# Patient Record
Sex: MALE | Race: WHITE | Employment: FULL TIME | URBAN - METROPOLITAN AREA
[De-identification: names, ages, dates, MRNs, and addresses within clinical notes are randomized per-mention and may not be internally consistent; named-entity substitution may affect disease eponyms.]

---

## 2017-04-24 ENCOUNTER — OFFICE VISIT (OUTPATIENT)
Dept: ORTHOPEDIC SURGERY | Age: 52
End: 2017-04-24

## 2017-04-24 VITALS — HEIGHT: 74 IN | WEIGHT: 274.91 LBS | BODY MASS INDEX: 35.28 KG/M2

## 2017-04-24 DIAGNOSIS — M17.11 PRIMARY OSTEOARTHRITIS OF RIGHT KNEE: Primary | ICD-10-CM

## 2017-04-24 PROCEDURE — 99213 OFFICE O/P EST LOW 20 MIN: CPT | Performed by: ORTHOPAEDIC SURGERY

## 2017-05-02 ENCOUNTER — OFFICE VISIT (OUTPATIENT)
Dept: ORTHOPEDIC SURGERY | Age: 52
End: 2017-05-02

## 2017-05-02 VITALS — BODY MASS INDEX: 35.28 KG/M2 | WEIGHT: 274.91 LBS | HEIGHT: 74 IN

## 2017-05-02 DIAGNOSIS — G56.01 CARPAL TUNNEL SYNDROME OF RIGHT WRIST: Primary | ICD-10-CM

## 2017-05-02 PROCEDURE — 99213 OFFICE O/P EST LOW 20 MIN: CPT | Performed by: ORTHOPAEDIC SURGERY

## 2017-05-04 ENCOUNTER — OFFICE VISIT (OUTPATIENT)
Dept: ORTHOPEDIC SURGERY | Age: 52
End: 2017-05-04

## 2017-05-04 DIAGNOSIS — M17.11 PRIMARY OSTEOARTHRITIS OF RIGHT KNEE: Primary | ICD-10-CM

## 2017-05-04 PROCEDURE — 20610 DRAIN/INJ JOINT/BURSA W/O US: CPT | Performed by: ORTHOPAEDIC SURGERY

## 2017-05-10 ENCOUNTER — OFFICE VISIT (OUTPATIENT)
Dept: ORTHOPEDIC SURGERY | Age: 52
End: 2017-05-10

## 2017-05-10 VITALS
DIASTOLIC BLOOD PRESSURE: 96 MMHG | BODY MASS INDEX: 35.28 KG/M2 | WEIGHT: 274.91 LBS | HEIGHT: 74 IN | HEART RATE: 88 BPM | SYSTOLIC BLOOD PRESSURE: 132 MMHG

## 2017-05-10 DIAGNOSIS — S16.1XXA CERVICAL STRAIN, INITIAL ENCOUNTER: Primary | ICD-10-CM

## 2017-05-10 PROCEDURE — 99212 OFFICE O/P EST SF 10 MIN: CPT | Performed by: PHYSICAL MEDICINE & REHABILITATION

## 2017-05-11 ENCOUNTER — OFFICE VISIT (OUTPATIENT)
Dept: ORTHOPEDIC SURGERY | Age: 52
End: 2017-05-11

## 2017-05-11 VITALS — BODY MASS INDEX: 35.28 KG/M2 | WEIGHT: 274.91 LBS | HEIGHT: 74 IN

## 2017-05-11 DIAGNOSIS — M17.11 PRIMARY OSTEOARTHRITIS OF RIGHT KNEE: Primary | ICD-10-CM

## 2017-05-11 PROCEDURE — 20610 DRAIN/INJ JOINT/BURSA W/O US: CPT | Performed by: ORTHOPAEDIC SURGERY

## 2017-05-18 ENCOUNTER — OFFICE VISIT (OUTPATIENT)
Dept: ORTHOPEDIC SURGERY | Age: 52
End: 2017-05-18

## 2017-05-18 DIAGNOSIS — M17.11 PRIMARY OSTEOARTHRITIS OF RIGHT KNEE: Primary | ICD-10-CM

## 2017-05-18 PROCEDURE — 20610 DRAIN/INJ JOINT/BURSA W/O US: CPT | Performed by: ORTHOPAEDIC SURGERY

## 2017-05-25 ENCOUNTER — OFFICE VISIT (OUTPATIENT)
Dept: ORTHOPEDIC SURGERY | Age: 52
End: 2017-05-25

## 2017-05-25 DIAGNOSIS — M17.11 PRIMARY OSTEOARTHRITIS OF RIGHT KNEE: Primary | ICD-10-CM

## 2017-05-25 PROCEDURE — 20610 DRAIN/INJ JOINT/BURSA W/O US: CPT | Performed by: ORTHOPAEDIC SURGERY

## 2017-06-01 ENCOUNTER — OFFICE VISIT (OUTPATIENT)
Dept: ORTHOPEDIC SURGERY | Age: 52
End: 2017-06-01

## 2017-06-01 DIAGNOSIS — M17.11 PRIMARY OSTEOARTHRITIS OF RIGHT KNEE: Primary | ICD-10-CM

## 2017-06-01 PROCEDURE — 20610 DRAIN/INJ JOINT/BURSA W/O US: CPT | Performed by: ORTHOPAEDIC SURGERY

## 2017-08-21 RX ORDER — NABUMETONE 500 MG/1
TABLET, FILM COATED ORAL
Qty: 60 TABLET | Refills: 2 | Status: SHIPPED | OUTPATIENT
Start: 2017-08-21 | End: 2018-07-01

## 2017-10-27 ENCOUNTER — OFFICE VISIT (OUTPATIENT)
Dept: ORTHOPEDIC SURGERY | Age: 52
End: 2017-10-27

## 2017-10-27 VITALS
HEIGHT: 74 IN | SYSTOLIC BLOOD PRESSURE: 139 MMHG | DIASTOLIC BLOOD PRESSURE: 97 MMHG | HEART RATE: 88 BPM | BODY MASS INDEX: 35.28 KG/M2 | WEIGHT: 274.91 LBS

## 2017-10-27 DIAGNOSIS — S16.1XXA CERVICAL STRAIN, INITIAL ENCOUNTER: Primary | ICD-10-CM

## 2017-10-27 PROCEDURE — 99212 OFFICE O/P EST SF 10 MIN: CPT | Performed by: PHYSICAL MEDICINE & REHABILITATION

## 2017-10-27 NOTE — PROGRESS NOTES
bilaterally reveals all areas to be without enlargement or induration  · Sensation: Sensation is intact without deficit  · Coordination/Balance: Good coordination     CERVICAL EXAMINATION:  · Inspection: Local inspection shows no step-off or bruising. Cervical alignment is normal.     · Palpation: No evidence of tenderness at the midline, and trapezius. Paraspinal tenderness is present. There is no step-off or paraspinal spasm. · Range of Motion: Near full extension and flexion  · Strength: 5/5 bilateral upper extremities   · Special Tests:    ·   Spurling's, L'Hermitte's & Haque's negative bilaterally. ·   Perla and Impingement tests are negative bilaterally. ·  Cubital and Carpal tunnel Tinel's negative bilaterally. · Skin:There are no rashes, ulcerations or lesions in right & left upper extremities. · Reflexes: Bilaterally triceps, biceps and brachioradialis are 2+. Clonus absent bilaterally at the feet. · Gait & station: Normal gait    · Additional Examinations:         · RIGHT UPPER EXTREMITY:  Inspection/examination of the right upper extremity does not show any tenderness, deformity or injury. Range of motion is full. There is no gross instability. There are no rashes, ulcerations or lesions. Strength and tone are normal.  · LEFT UPPER EXTREMITY: Inspection/examination of the left upper extremity does not show any tenderness, deformity or injury. Range of motion is full. There is no gross instability. There are no rashes, ulcerations or lesions. Strength and tone are normal.    Diagnostic Testing:     No new testing today    Impression:  #1 stable cervical strain and remote work injury      Plan:  #1 symptoms are tolerable and patient is doing well with current modified restrictions.   I only need to see him twice a year for routine follow-up continue same restrictions        MK Wooten

## 2017-10-31 ENCOUNTER — TELEPHONE (OUTPATIENT)
Dept: ORTHOPEDIC SURGERY | Age: 52
End: 2017-10-31

## 2017-10-31 ENCOUNTER — OFFICE VISIT (OUTPATIENT)
Dept: ORTHOPEDIC SURGERY | Age: 52
End: 2017-10-31

## 2017-10-31 VITALS — BODY MASS INDEX: 35.28 KG/M2 | WEIGHT: 274.91 LBS | HEIGHT: 74 IN

## 2017-10-31 DIAGNOSIS — M17.11 PRIMARY OSTEOARTHRITIS OF RIGHT KNEE: ICD-10-CM

## 2017-10-31 DIAGNOSIS — M17.11 PRIMARY LOCALIZED OSTEOARTHROSIS OF RIGHT LOWER LEG: Primary | Chronic | ICD-10-CM

## 2017-10-31 PROCEDURE — 99213 OFFICE O/P EST LOW 20 MIN: CPT | Performed by: ORTHOPAEDIC SURGERY

## 2017-10-31 PROCEDURE — 73560 X-RAY EXAM OF KNEE 1 OR 2: CPT | Performed by: ORTHOPAEDIC SURGERY

## 2017-10-31 NOTE — PROGRESS NOTES
KNEE VISIT      HISTORY OF PRESENT ILLNESS    Rudy Grey is a 47 y.o. male who presents for reevaluation of his right knee. He says been having issues with increasing odd feeling in his knee with symptoms consistent with increased arthritis. He has not had x-rays since his last surgery in 2014. He tried Visco supplementation periodically, but it does not seem to give him the long-term relief that he was hoping for generally. We finished last round in June of this year. ROS    Constitutional:  Denies fever or chills   Eyes:  Denies change in visual acuity   HENT:  Denies nasal congestion or sore throat   Respiratory:  Denies cough or shortness of breath   Cardiovascular:  Denies chest pain or edema   GI:  Denies abdominal pain, nausea, vomiting, bloody stools or diarrhea   :  Denies dysuria   Musculoskeletal: He has continued right hand pain  Integument:  Denies rash   Neurologic:  He has persistent right hand pain status post carpal tunnel release. Endocrine:  Denies polyuria or polydipsia   Lymphatic:  Denies swollen glands   Psychiatric:  Denies depression or anxiety   All other ROS negative except for above.     Past Surgical history    Past Surgical History:   Procedure Laterality Date    CARPAL TUNNEL RELEASE      COLONOSCOPY  11/04/2016    normal    CYST REMOVAL      HAND SURGERY      HERNIA REPAIR      KNEE ARTHROSCOPY      KNEE ARTHROSCOPY Right 12/01/2014    KNEE SURGERY         PAST MEDICAL    Past Medical History:   Diagnosis Date    Carpal tunnel syndrome 10/24/2013    Herniated disc     Hypertension     No history of procedure 11/04/2016    Primary localized osteoarthrosis, lower leg 10/17/2013       Allergies    Allergies   Allergen Reactions    Dye [Iodides]     Iodine Swelling    Pcn [Penicillins]     Penicillins Rash       Meds    Current Outpatient Prescriptions   Medication Sig Dispense Refill    nabumetone (RELAFEN) 500 MG tablet TAKE ONE TABLET BY MOUTH TWICE A DAY 60 tablet 2    meclizine (ANTIVERT) 25 MG tablet Take 25 mg by mouth 3 times daily as needed      methocarbamol (ROBAXIN-750) 750 MG tablet Take 1 tablet by mouth 3 times daily 90 tablet 3    methocarbamol (ROBAXIN-750) 750 MG tablet i po TID PRN 90 tablet 0    tadalafil (CIALIS) 5 MG tablet Take 5 mg by mouth as needed for Erectile Dysfunction.  traMADol (ULTRAM) 50 MG tablet Take 50 mg by mouth every 6 hours as needed.  aspirin 81 MG tablet Take 81 mg by mouth daily. No current facility-administered medications for this visit. Social    Social History     Social History    Marital status: Single     Spouse name: N/A    Number of children: N/A    Years of education: N/A     Occupational History    Not on file. Social History Main Topics    Smoking status: Former Smoker     Quit date: 1/30/1999    Smokeless tobacco: Never Used    Alcohol use Yes      Comment: rarely    Drug use: No    Sexual activity: Yes     Partners: Female     Other Topics Concern    Not on file     Social History Narrative    ** Merged History Encounter **            Family HISTORY    Family History   Problem Relation Age of Onset    Early Death Father     Heart Disease Father     Cancer Maternal Aunt     Cancer Maternal Grandmother        PHYSICAL EXAM    Vital Signs:  Ht 6' 2.02\" (1.88 m)   Wt 274 lb 14.6 oz (124.7 kg)   BMI 35.28 kg/m²   General Appearance:  Normal body habitus. Alert and oriented to person, place, and time. Affect:  Normal.   Gait:  Normal. Good balance and coordination. Skin:  Intact. Sensation:  Intact. Strength:  Intact. Reflexes:  Intact. Pulses:  Intact.    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 IMAGING STUDIES    X-rays 2 views of the right knee reveals increasingly severe osteoarthritis, most in the medial compartment was right knee with diminished on     IMPRESSION    Right knee pain secondary to osteoarthritis, with possible stress reaction    PLAN      1. Conservative care options including physical therapy, NSAIDs, bracing, and activity modification were discussed. 2.  The indications for therapeutic injections were discussed. 3.  The indications for additional imaging studies were discussed.    4.  After considering the various options discussed, the patient elected to pursue a course that includes proceed with an MRI of the right knee since he continues to pursue a conservative approach with diminishing

## 2017-11-02 ENCOUNTER — OFFICE VISIT (OUTPATIENT)
Dept: ORTHOPEDIC SURGERY | Age: 52
End: 2017-11-02

## 2017-11-02 VITALS — WEIGHT: 274.91 LBS | HEIGHT: 74 IN | BODY MASS INDEX: 35.28 KG/M2

## 2017-11-02 DIAGNOSIS — G56.01 CARPAL TUNNEL SYNDROME ON RIGHT: Primary | ICD-10-CM

## 2017-11-02 DIAGNOSIS — G56.01 CARPAL TUNNEL SYNDROME OF RIGHT WRIST: ICD-10-CM

## 2017-11-02 PROCEDURE — L3918 METACARP FX ORTHOSIS PRE OTS: HCPCS | Performed by: ORTHOPAEDIC SURGERY

## 2017-11-02 PROCEDURE — 99213 OFFICE O/P EST LOW 20 MIN: CPT | Performed by: ORTHOPAEDIC SURGERY

## 2017-11-02 NOTE — PROGRESS NOTES
methocarbamol (ROBAXIN-750) 750 MG tablet i po TID PRN 90 tablet 0    tadalafil (CIALIS) 5 MG tablet Take 5 mg by mouth as needed for Erectile Dysfunction.  traMADol (ULTRAM) 50 MG tablet Take 50 mg by mouth every 6 hours as needed.  aspirin 81 MG tablet Take 81 mg by mouth daily. No current facility-administered medications for this visit. Social    Social History     Social History    Marital status: Single     Spouse name: N/A    Number of children: N/A    Years of education: N/A     Occupational History    Not on file. Social History Main Topics    Smoking status: Former Smoker     Quit date: 1/30/1999    Smokeless tobacco: Never Used    Alcohol use Yes      Comment: rarely    Drug use: No    Sexual activity: Yes     Partners: Female     Other Topics Concern    Not on file     Social History Narrative    ** Merged History Encounter **            Family HISTORY    Family History   Problem Relation Age of Onset    Early Death Father     Heart Disease Father     Cancer Maternal Aunt     Cancer Maternal Grandmother        PHYSICAL EXAM    Vital Signs:  Ht 6' 2.02\" (1.88 m)   Wt 274 lb 14.6 oz (124.7 kg)   BMI 35.28 kg/m²   General Appearance:  Normal body habitus. Alert and oriented to person, place, and time. Affect:  Normal.   Gait:  Normal. Good balance and coordination. Reflexes:  Intact. Pulses:  Normal.   Skin:  Normal.     Wrist Exam  Hand dominance - Right  Surface Exam  no cutaneous lesions are seen      Right Hand Exam:      Neurologic Exam:    Reflexes:  Normal  Phalens:  Negative   Tinels:  Negative  Median Nerve Compression:  Negative  Thenar strength:  Normal  Thenar atrophy:  None noted  2 PD:  Normal  Elbow Flexion Test:  Negative  Is negative.   Finkelstein's test, but has some pain in the base of thumb pressure  Wrist Motion Right Left   DF     PF     RD     CMC     UD     SUP     PRO       NCV / EMG STUDIES    None    IMAGING STUDIES None    IMPRESSION    Right hand pain status post carpal tunnel release    PLAN    1. Conservative care options including physical therapy, NSAIDs, bracing, and activity modification were discussed. 2.  The indications for therapeutic injections were discussed. 3.  The indications for additional imaging studies were discussed.    4.  After considering the various options discussed, the patient elected to pursue a course that includes a wrist support and observation

## 2017-11-09 ENCOUNTER — OFFICE VISIT (OUTPATIENT)
Dept: ORTHOPEDIC SURGERY | Age: 52
End: 2017-11-09

## 2017-11-09 DIAGNOSIS — M17.11 PRIMARY LOCALIZED OSTEOARTHROSIS OF RIGHT LOWER LEG: Primary | Chronic | ICD-10-CM

## 2017-11-09 PROCEDURE — 99213 OFFICE O/P EST LOW 20 MIN: CPT | Performed by: ORTHOPAEDIC SURGERY

## 2017-11-09 NOTE — PROGRESS NOTES
KNEE VISIT      HISTORY OF PRESENT ILLNESS    Arina Estevez is a 47 y.o. male who presents for reevaluation of his right knee. He's had his MRI and is here for review. He still persists in having pain. ROS    Constitutional:  Denies fever or chills   Eyes:  Denies change in visual acuity   HENT:  Denies nasal congestion or sore throat   Respiratory:  Denies cough or shortness of breath   Cardiovascular:  Denies chest pain or edema   GI:  Denies abdominal pain, nausea, vomiting, bloody stools or diarrhea   :  Denies dysuria   Musculoskeletal: He has continued right hand pain  Integument:  Denies rash   Neurologic:  He has persistent right hand pain status post carpal tunnel release. Endocrine:  Denies polyuria or polydipsia   Lymphatic:  Denies swollen glands   Psychiatric:  Denies depression or anxiety   All other ROS negative except for above.     Past Surgical history    Past Surgical History:   Procedure Laterality Date    CARPAL TUNNEL RELEASE      COLONOSCOPY  11/04/2016    normal    CYST REMOVAL      HAND SURGERY      HERNIA REPAIR      KNEE ARTHROSCOPY      KNEE ARTHROSCOPY Right 12/01/2014    KNEE SURGERY         PAST MEDICAL    Past Medical History:   Diagnosis Date    Carpal tunnel syndrome 10/24/2013    Herniated disc     Hypertension     No history of procedure 11/04/2016    Primary localized osteoarthrosis, lower leg 10/17/2013       Allergies    Allergies   Allergen Reactions    Dye [Iodides]     Iodine Swelling    Pcn [Penicillins]     Penicillins Rash       Meds    Current Outpatient Prescriptions   Medication Sig Dispense Refill    nabumetone (RELAFEN) 500 MG tablet TAKE ONE TABLET BY MOUTH TWICE A DAY 60 tablet 2    meclizine (ANTIVERT) 25 MG tablet Take 25 mg by mouth 3 times daily as needed      methocarbamol (ROBAXIN-750) 750 MG tablet Take 1 tablet by mouth 3 times daily 90 tablet 3    methocarbamol (ROBAXIN-750) 750 MG tablet i po TID PRN 90 tablet 0    tadalafil (CIALIS) 5 MG tablet Take 5 mg by mouth as needed for Erectile Dysfunction.  traMADol (ULTRAM) 50 MG tablet Take 50 mg by mouth every 6 hours as needed.  aspirin 81 MG tablet Take 81 mg by mouth daily. No current facility-administered medications for this visit. Social    Social History     Social History    Marital status: Single     Spouse name: N/A    Number of children: N/A    Years of education: N/A     Occupational History    Not on file. Social History Main Topics    Smoking status: Former Smoker     Quit date: 1/30/1999    Smokeless tobacco: Never Used    Alcohol use Yes      Comment: rarely    Drug use: No    Sexual activity: Yes     Partners: Female     Other Topics Concern    Not on file     Social History Narrative    ** Merged History Encounter **            Family HISTORY    Family History   Problem Relation Age of Onset    Early Death Father     Heart Disease Father     Cancer Maternal Aunt     Cancer Maternal Grandmother        PHYSICAL EXAM    Vital Signs: There were no vitals taken for this visit. General Appearance:  Normal body habitus. Alert and oriented to person, place, and time. Affect:  Normal.   Gait:  Normal. Good balance and coordination. Skin:  Intact. Sensation:  Intact. Strength:  Intact. Reflexes:  Intact. Pulses:  Intact. 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 IMAGING STUDIES    MRI of his right knee reveals osteoarthritic change.   No stress reaction seen.    IMPRESSION    Right knee pain secondary to osteoarthritis,    PLAN      1. Conservative care options including physical therapy, NSAIDs, bracing, and activity modification were discussed. 2.  The indications for therapeutic injections were discussed. 3.  The indications for additional imaging studies were discussed.    4.  After considering the various options discussed, the patient elected to pursue a course that includes considering Visco supplementation and

## 2017-12-01 ENCOUNTER — OFFICE VISIT (OUTPATIENT)
Dept: FAMILY MEDICINE CLINIC | Age: 52
End: 2017-12-01

## 2017-12-01 VITALS
HEIGHT: 73 IN | OXYGEN SATURATION: 98 % | SYSTOLIC BLOOD PRESSURE: 124 MMHG | BODY MASS INDEX: 38.2 KG/M2 | HEART RATE: 86 BPM | TEMPERATURE: 97.9 F | WEIGHT: 288.2 LBS | DIASTOLIC BLOOD PRESSURE: 80 MMHG

## 2017-12-01 DIAGNOSIS — J40 BRONCHITIS: Primary | ICD-10-CM

## 2017-12-01 DIAGNOSIS — J00 ACUTE NASOPHARYNGITIS: ICD-10-CM

## 2017-12-01 PROCEDURE — 99213 OFFICE O/P EST LOW 20 MIN: CPT | Performed by: FAMILY MEDICINE

## 2017-12-01 RX ORDER — AZITHROMYCIN 250 MG/1
TABLET, FILM COATED ORAL
Qty: 1 PACKET | Refills: 0 | Status: SHIPPED | OUTPATIENT
Start: 2017-12-01 | End: 2017-12-11

## 2017-12-01 NOTE — LETTER
[facility]  [address] [facility]  Phone: [phone]  Fax: [phone]    Yanira Veliz MD        December 1, 2017     Patient: Piedad Bumpers   YOB: 1965   Date of Visit: 12/1/2017       To Whom it May Concern:    Jarrett Garcia was seen in my clinic on 12/1/2017 for a upper resp infection. He may return to work on 12/04/17. Please excuse him on 12/01/17. If you have any questions or concerns, please don't hesitate to call.     Sincerely,         Yanira Veliz MD

## 2017-12-01 NOTE — PATIENT INSTRUCTIONS
cold medicines. Try to take one that treats the symptoms for which you are currently experiencing. This may mean different medications at different stages of the virus. Non-medicine treatments that are helpful are increasing your fluid intake, or using menthol cough drops and menthol rubs like Vicks Vapor Rub. Using a vaporizer/ humidifier, especially at night is also very helpful. AFRIN NASAL SPRAY (OXYMETHAZOLONE DECONGESTANT SPRAY)  can be used twice daily but only for a MAXIMUM OF 3 DAYS. Longer use can cause rebound congestion. MUCINEX OR MUCOUS RELIEF TABLETS with or without a decongestant or cough suppressant can be very helpful. IF YOU ARE UNSURE IF YOU HAVE A VIRAL INFECTION OR BACTERIAL INFECTION, IT IS BEST TO SCHEDULE AN APPOINTMENT.

## 2017-12-01 NOTE — PROGRESS NOTES
BP Readings from Last 2 Encounters:   12/01/17 124/80   10/27/17 (!) 139/97     Lab Review   No visits with results within 6 Month(s) from this visit.    Latest known visit with results is:   Admission on 04/16/2015, Discharged on 04/16/2015   Component Date Value    Ventricular Rate 04/16/2015 74     Atrial Rate 04/16/2015 74     P-R Interval 04/16/2015 136     QRS Duration 04/16/2015 86     Q-T Interval 04/16/2015 376     QTc Calculation (Bazett) 04/16/2015 417     R Axis 04/16/2015 104     T Axis 04/16/2015 88     Diagnosis 04/16/2015 Normal sinus rhythmRightward axisBorderline ECGNo previous ECGs availableConfirmed by Selina Elliott MD, SHIRLEY ([id number]) on 4/16/2015 3:11:59 PM     WBC 04/16/2015 12.4*    RBC 04/16/2015 5.59     Hemoglobin 04/16/2015 16.2     Hematocrit 04/16/2015 49.2     MCV 04/16/2015 88.1     MCH 04/16/2015 28.9     MCHC 04/16/2015 32.8     RDW 04/16/2015 13.4     Platelets 05/91/1090 231     MPV 04/16/2015 8.2     Neutrophils % 04/16/2015 86.5     Lymphocytes % 04/16/2015 10.2     Monocytes % 04/16/2015 2.5     Eosinophils % 04/16/2015 0.1     Basophils % 04/16/2015 0.7     Neutrophils # 04/16/2015 10.7*    Lymphocytes # 04/16/2015 1.3     Monocytes # 04/16/2015 0.3     Eosinophils # 04/16/2015 0.0     Basophils # 04/16/2015 0.1     Sodium 04/16/2015 140     Potassium 04/16/2015 4.5     Chloride 04/16/2015 102     CO2 04/16/2015 26     Anion Gap 04/16/2015 12     Glucose 04/16/2015 128*    BUN 04/16/2015 22*    CREATININE 04/16/2015 0.7*    GFR Non- 04/16/2015 >60     GFR  04/16/2015 >60     Calcium 04/16/2015 9.8     Total Protein 04/16/2015 7.8     Alb 04/16/2015 4.7     Albumin/Globulin Ratio 04/16/2015 1.5     Total Bilirubin 04/16/2015 0.5     Alkaline Phosphatase 04/16/2015 58     ALT 04/16/2015 25     AST 04/16/2015 19     Globulin 04/16/2015 3.1     Troponin 04/16/2015 <0.01     Color, UA 04/16/2015 Yellow     Otherwise used DayQuil/NyQuil or equivalents. Note for work given. Call Monday if no better or immediately with worsening. He is reminded to return in 3-4 months to catch up several health maintenance issues that need completion. Patient should call the office immediately with new or ongoing signs or symptoms or worsening, or proceed to the emergency room. All entries in chief complaint and history of present illness are reviewed and validated by me. No changes in past medical history, past surgical history, social history, or family history were noted during the patient encounter unless specifically listed above. All updates of past medical history, past surgical history, social history, or family history were reviewed personally by me during the office visit. All problems listed in the assessment are stable unless noted otherwise. Medication profile reviewed personally by me during the office visit. Medication side effects and possible impairments from medications were discussed as applicable. Every effort has been made to assure accurate transcription by this voice recognition software. However, mistakes in transcription may still occur          I, Dr. Bhumi Mancuso, personally performed the services described in this documentation, as scribed by Jose D Alas RN, in my presence, and it is both accurate and complete. I agree with the Chief Complaint, ROS, and Past Histories independently gathered by the clinical support staff and the remaining scribed note accurately describes my personal service to the patient.     12/1/2017    11:37 AM

## 2017-12-14 ENCOUNTER — OFFICE VISIT (OUTPATIENT)
Dept: ORTHOPEDIC SURGERY | Age: 52
End: 2017-12-14

## 2017-12-14 DIAGNOSIS — M17.11 PRIMARY OSTEOARTHRITIS OF RIGHT KNEE: Primary | ICD-10-CM

## 2017-12-14 PROCEDURE — 20610 DRAIN/INJ JOINT/BURSA W/O US: CPT | Performed by: ORTHOPAEDIC SURGERY

## 2017-12-21 ENCOUNTER — OFFICE VISIT (OUTPATIENT)
Dept: ORTHOPEDIC SURGERY | Age: 52
End: 2017-12-21

## 2017-12-21 DIAGNOSIS — M17.11 PRIMARY OSTEOARTHRITIS OF RIGHT KNEE: Primary | ICD-10-CM

## 2017-12-21 PROCEDURE — 20610 DRAIN/INJ JOINT/BURSA W/O US: CPT | Performed by: ORTHOPAEDIC SURGERY

## 2017-12-28 ENCOUNTER — OFFICE VISIT (OUTPATIENT)
Dept: ORTHOPEDIC SURGERY | Age: 52
End: 2017-12-28

## 2017-12-28 DIAGNOSIS — M17.11 PRIMARY OSTEOARTHRITIS OF RIGHT KNEE: Primary | ICD-10-CM

## 2017-12-28 PROCEDURE — 20610 DRAIN/INJ JOINT/BURSA W/O US: CPT | Performed by: ORTHOPAEDIC SURGERY

## 2018-01-03 ENCOUNTER — OFFICE VISIT (OUTPATIENT)
Dept: ORTHOPEDIC SURGERY | Age: 53
End: 2018-01-03

## 2018-01-03 DIAGNOSIS — M17.11 PRIMARY OSTEOARTHRITIS OF RIGHT KNEE: Primary | ICD-10-CM

## 2018-01-03 PROCEDURE — 20610 DRAIN/INJ JOINT/BURSA W/O US: CPT | Performed by: ORTHOPAEDIC SURGERY

## 2018-01-25 ENCOUNTER — OFFICE VISIT (OUTPATIENT)
Dept: ORTHOPEDIC SURGERY | Age: 53
End: 2018-01-25

## 2018-01-25 DIAGNOSIS — M17.11 PRIMARY OSTEOARTHRITIS OF RIGHT KNEE: Primary | ICD-10-CM

## 2018-01-25 PROCEDURE — 20610 DRAIN/INJ JOINT/BURSA W/O US: CPT | Performed by: ORTHOPAEDIC SURGERY

## 2018-03-12 ENCOUNTER — NURSE ONLY (OUTPATIENT)
Dept: FAMILY MEDICINE CLINIC | Age: 53
End: 2018-03-12

## 2018-03-12 DIAGNOSIS — E78.5 HYPERLIPIDEMIA, UNSPECIFIED HYPERLIPIDEMIA TYPE: Primary | ICD-10-CM

## 2018-03-12 DIAGNOSIS — I10 ESSENTIAL HYPERTENSION: ICD-10-CM

## 2018-03-12 LAB
A/G RATIO: 1.7 (ref 1.1–2.2)
ALBUMIN SERPL-MCNC: 4.3 G/DL (ref 3.4–5)
ALP BLD-CCNC: 58 U/L (ref 40–129)
ALT SERPL-CCNC: 24 U/L (ref 10–40)
ANION GAP SERPL CALCULATED.3IONS-SCNC: 14 MMOL/L (ref 3–16)
AST SERPL-CCNC: 16 U/L (ref 15–37)
BASOPHILS ABSOLUTE: 0 K/UL (ref 0–0.2)
BASOPHILS RELATIVE PERCENT: 0.3 %
BILIRUB SERPL-MCNC: 0.5 MG/DL (ref 0–1)
BUN BLDV-MCNC: 16 MG/DL (ref 7–20)
CALCIUM SERPL-MCNC: 8.7 MG/DL (ref 8.3–10.6)
CHLORIDE BLD-SCNC: 106 MMOL/L (ref 99–110)
CHOLESTEROL, TOTAL: 164 MG/DL (ref 0–199)
CO2: 24 MMOL/L (ref 21–32)
CREAT SERPL-MCNC: 0.8 MG/DL (ref 0.9–1.3)
EOSINOPHILS ABSOLUTE: 0.1 K/UL (ref 0–0.6)
EOSINOPHILS RELATIVE PERCENT: 0.6 %
GFR AFRICAN AMERICAN: >60
GFR NON-AFRICAN AMERICAN: >60
GLOBULIN: 2.5 G/DL
GLUCOSE BLD-MCNC: 135 MG/DL (ref 70–99)
HCT VFR BLD CALC: 49.5 % (ref 40.5–52.5)
HDLC SERPL-MCNC: 26 MG/DL (ref 40–60)
HEMOGLOBIN: 16.9 G/DL (ref 13.5–17.5)
LDL CHOLESTEROL CALCULATED: 101 MG/DL
LYMPHOCYTES ABSOLUTE: 1.8 K/UL (ref 1–5.1)
LYMPHOCYTES RELATIVE PERCENT: 22.1 %
MCH RBC QN AUTO: 29.4 PG (ref 26–34)
MCHC RBC AUTO-ENTMCNC: 34.2 G/DL (ref 31–36)
MCV RBC AUTO: 85.8 FL (ref 80–100)
MONOCYTES ABSOLUTE: 0.5 K/UL (ref 0–1.3)
MONOCYTES RELATIVE PERCENT: 5.7 %
NEUTROPHILS ABSOLUTE: 5.8 K/UL (ref 1.7–7.7)
NEUTROPHILS RELATIVE PERCENT: 71.3 %
PDW BLD-RTO: 15.1 % (ref 12.4–15.4)
PLATELET # BLD: 201 K/UL (ref 135–450)
PMV BLD AUTO: 8.4 FL (ref 5–10.5)
POTASSIUM SERPL-SCNC: 4.5 MMOL/L (ref 3.5–5.1)
RBC # BLD: 5.77 M/UL (ref 4.2–5.9)
SODIUM BLD-SCNC: 144 MMOL/L (ref 136–145)
TOTAL PROTEIN: 6.8 G/DL (ref 6.4–8.2)
TRIGL SERPL-MCNC: 187 MG/DL (ref 0–150)
VLDLC SERPL CALC-MCNC: 37 MG/DL
WBC # BLD: 8.1 K/UL (ref 4–11)

## 2018-03-12 PROCEDURE — 36415 COLL VENOUS BLD VENIPUNCTURE: CPT | Performed by: FAMILY MEDICINE

## 2018-03-13 DIAGNOSIS — R73.9 HYPERGLYCEMIA: ICD-10-CM

## 2018-03-13 DIAGNOSIS — R73.9 HYPERGLYCEMIA: Primary | ICD-10-CM

## 2018-03-13 LAB
ESTIMATED AVERAGE GLUCOSE: 142.7 MG/DL
HBA1C MFR BLD: 6.6 %

## 2018-03-19 ENCOUNTER — TELEPHONE (OUTPATIENT)
Dept: FAMILY MEDICINE CLINIC | Age: 53
End: 2018-03-19

## 2018-03-19 NOTE — TELEPHONE ENCOUNTER
Attempted to contact patient on 3/19/2018. Result: no answer at the patient's number, no voicemail available. Pre-Visit planning not completed.

## 2018-03-26 ENCOUNTER — OFFICE VISIT (OUTPATIENT)
Dept: FAMILY MEDICINE CLINIC | Age: 53
End: 2018-03-26

## 2018-03-26 VITALS
WEIGHT: 283.8 LBS | OXYGEN SATURATION: 98 % | TEMPERATURE: 97.8 F | HEIGHT: 72 IN | HEART RATE: 82 BPM | DIASTOLIC BLOOD PRESSURE: 92 MMHG | BODY MASS INDEX: 38.44 KG/M2 | SYSTOLIC BLOOD PRESSURE: 124 MMHG

## 2018-03-26 DIAGNOSIS — Z82.49 FAMILY HISTORY OF ASCVD (ARTERIOSCLEROTIC CARDIOVASCULAR DISEASE): ICD-10-CM

## 2018-03-26 DIAGNOSIS — I10 ESSENTIAL HYPERTENSION: ICD-10-CM

## 2018-03-26 DIAGNOSIS — J06.9 VIRAL URI: ICD-10-CM

## 2018-03-26 DIAGNOSIS — E66.9 OBESITY (BMI 30-39.9): ICD-10-CM

## 2018-03-26 DIAGNOSIS — E78.2 MIXED HYPERLIPIDEMIA: ICD-10-CM

## 2018-03-26 DIAGNOSIS — E11.9 CONTROLLED TYPE 2 DIABETES MELLITUS WITHOUT COMPLICATION, WITHOUT LONG-TERM CURRENT USE OF INSULIN (HCC): ICD-10-CM

## 2018-03-26 PROCEDURE — 99214 OFFICE O/P EST MOD 30 MIN: CPT | Performed by: FAMILY MEDICINE

## 2018-03-26 RX ORDER — BENZONATATE 200 MG/1
200 CAPSULE ORAL 3 TIMES DAILY PRN
Qty: 30 CAPSULE | Refills: 0 | Status: SHIPPED | OUTPATIENT
Start: 2018-03-26 | End: 2018-04-02

## 2018-03-26 ASSESSMENT — PATIENT HEALTH QUESTIONNAIRE - PHQ9
2. FEELING DOWN, DEPRESSED OR HOPELESS: 0
SUM OF ALL RESPONSES TO PHQ QUESTIONS 1-9: 0
SUM OF ALL RESPONSES TO PHQ9 QUESTIONS 1 & 2: 0
1. LITTLE INTEREST OR PLEASURE IN DOING THINGS: 0

## 2018-03-26 NOTE — PROGRESS NOTES
Component Value Date    LABA1C 6.6 03/12/2018     Lab Results   Component Value Date    LABMICR Not Indicated 04/16/2015    CREATININE 0.8 (L) 03/12/2018     Lab Results   Component Value Date    ALT 24 03/12/2018    AST 16 03/12/2018     Lab Results   Component Value Date    CHOL 164 03/12/2018    TRIG 187 (H) 03/12/2018    HDL 26 (L) 03/12/2018    LDLCALC 101 (H) 03/12/2018          Patient's medications, allergies, past medical, surgical, social and family histories were reviewed and updated as appropriate on 3/26/2018 at 11:50 AM.    ROS:  Review of Systems    All other systems reviewed and are negative except as noted above on 3/26/2018 at 11:50 AM. Additional review of systems may be scanned into the media section of this medical record. Any responses requiring further intervention were pursued. Hemoglobin A1C (%)   Date Value   03/12/2018 6.6     Microscopic Examination (no units)   Date Value   04/16/2015 Not Indicated     LDL Calculated (mg/dL)   Date Value   03/12/2018 101 (H)     Past Medical History:   Diagnosis Date    Carpal tunnel syndrome 10/24/2013    Herniated disc     Hypertension     No history of procedure 11/04/2016    Primary localized osteoarthrosis, lower leg 10/17/2013     Family History   Problem Relation Age of Onset    Early Death Father     Heart Disease Father     Cancer Maternal Aunt     Cancer Maternal Grandmother      Social History     Social History    Marital status: Single     Spouse name: N/A    Number of children: N/A    Years of education: N/A     Occupational History    Not on file.      Social History Main Topics    Smoking status: Former Smoker     Quit date: 1/30/1999    Smokeless tobacco: Never Used    Alcohol use Yes      Comment: rarely    Drug use: No    Sexual activity: Yes     Partners: Female     Other Topics Concern    Not on file     Social History Narrative    ** Merged History Encounter **            Prior to Visit Medications Medication Sig Taking? Authorizing Provider   NONFORMULARY He states Dr. Leon Larson has him on an Estrogen Pill TID 12/1/17 Yes Historical Provider, MD   nabumetone (RELAFEN) 500 MG tablet TAKE ONE TABLET BY MOUTH TWICE A DAY Yes Valeria Powers MD   meclizine (ANTIVERT) 25 MG tablet Take 25 mg by mouth 3 times daily as needed Yes Historical Provider, MD   methocarbamol (ROBAXIN-750) 750 MG tablet Take 1 tablet by mouth 3 times daily Yes MK Son MD   tadalafil (CIALIS) 5 MG tablet Take 5 mg by mouth as needed for Erectile Dysfunction. Yes Historical Provider, MD   traMADol (ULTRAM) 50 MG tablet Take 50 mg by mouth every 6 hours as needed. Yes Historical Provider, MD   aspirin 81 MG tablet Take 81 mg by mouth daily. Yes Historical Provider, MD     Allergies   Allergen Reactions    Dye [Iodides]     Iodine Swelling    Pcn [Penicillins]     Penicillins Rash       OBJECTIVE:  Estimated body mass index is 38.76 kg/m² as calculated from the following:    Height as of this encounter: 5' 11.75\" (1.822 m). Weight as of this encounter: 283 lb 12.8 oz (128.7 kg). Vitals:    03/26/18 1142   BP: (!) 142/92   Site: Left Arm   Position: Sitting   Cuff Size: Large Adult   Pulse: 82   Temp: 97.8 °F (36.6 °C)   SpO2: 98%   Weight: 283 lb 12.8 oz (128.7 kg)   Height: 5' 11.75\" (1.822 m)     BP Readings from Last 2 Encounters:   03/26/18 (!) 124/92   12/01/17 124/80     Wt Readings from Last 3 Encounters:   03/26/18 283 lb 12.8 oz (128.7 kg)   12/01/17 288 lb 3.2 oz (130.7 kg)   11/02/17 274 lb 14.6 oz (124.7 kg)       Physical Exam   Constitutional: He appears well-developed and well-nourished. No distress. HENT:   Head: Normocephalic and atraumatic. Right Ear: External ear normal.   Left Ear: External ear normal.   Nose: Nose normal.   Throat is red  Right ear is 60 % occluded by cerumen  Left ear is 30% occluded by cerumen   Eyes: Lids are normal. Pupils are equal, round, and reactive to light.  Right eye exhibits no discharge. Left eye exhibits no discharge. No scleral icterus. Neck: No JVD present. No thyromegaly present. Cardiovascular: Normal rate, regular rhythm and normal heart sounds. Pulmonary/Chest: Effort normal and breath sounds normal. No respiratory distress. Abdominal: Soft. There is no hepatosplenomegaly. There is no tenderness. Musculoskeletal: He exhibits edema (bilateral LE). Skin: Skin is warm and dry. No rash noted. He is not diaphoretic. No erythema. No pallor. Turgor normal   Psychiatric: He has a normal mood and affect. His behavior is normal. Judgment and thought content normal.   Nursing note and vitals reviewed. ASSESSMENT PLAN     1. Class 2 obesity due to excess calories with serious comorbidity and body mass index (BMI) of 36.0 to 36.9 in adult     2. Family history of ASCVD (arteriosclerotic cardiovascular disease)     3. Controlled type 2 diabetes mellitus without complication, without long-term current use of insulin ([address]     4. Viral URI  benzonatate (TESSALON) 200 MG capsule   5. Mixed hyperlipidemia  Lipid Panel   6. Essential hypertension     7. Obesity (BMI 30-39. 9)     Patient felt like the blood pressure medicine and statin he was on the past cloud is thinking and his memory. He cannot remember the name of the statin thinks he goes back to 2010. He was on fenofibrate for a while but now has been stopped. We stress that risk factor control does reduce complications such as heart attack and stroke. We will I'm 3 months to try to get his blood pressure down his weight down his sugar down. If it doesn't occur we will discuss medications once again. His hemoglobin A1c was 6.6. He has a viral URI will recommend symptomatic treatment. His LDL was 101 there is a significant family history however of cardiovascular disease. I discussed how we use statins with diabetics.        Scribe attestation: Lisa Loaiza RN, am scribing for and in the presence of Racheal Galvan MD. Electronically signed by Tera Griffin RN on 3/26/2018 at 11:50 AM      Provider attestation:     I, Dr. Yoshi Campbell, personally performed the services described in this documentation, as scribed by the above signed scribe in my presence, and it is both accurate and complete. I agree with the ROS and Past Histories independently gathered by the clinical support staff and the remaining scribed note accurately describes my personal service to the patient.       3/26/2018    4:25 PM

## 2018-04-23 ENCOUNTER — OFFICE VISIT (OUTPATIENT)
Dept: ORTHOPEDIC SURGERY | Age: 53
End: 2018-04-23

## 2018-04-23 VITALS — HEIGHT: 71 IN | BODY MASS INDEX: 39.72 KG/M2 | WEIGHT: 283.73 LBS

## 2018-04-23 DIAGNOSIS — M17.11 PRIMARY OSTEOARTHRITIS OF RIGHT KNEE: Primary | ICD-10-CM

## 2018-04-23 PROCEDURE — 99213 OFFICE O/P EST LOW 20 MIN: CPT | Performed by: ORTHOPAEDIC SURGERY

## 2018-04-24 ENCOUNTER — OFFICE VISIT (OUTPATIENT)
Dept: ORTHOPEDIC SURGERY | Age: 53
End: 2018-04-24

## 2018-04-24 VITALS — HEIGHT: 71 IN | WEIGHT: 283.73 LBS | BODY MASS INDEX: 39.72 KG/M2

## 2018-04-24 DIAGNOSIS — G56.01 CARPAL TUNNEL SYNDROME ON RIGHT: Primary | ICD-10-CM

## 2018-04-24 PROCEDURE — 99213 OFFICE O/P EST LOW 20 MIN: CPT | Performed by: ORTHOPAEDIC SURGERY

## 2018-04-25 ENCOUNTER — OFFICE VISIT (OUTPATIENT)
Dept: ORTHOPEDIC SURGERY | Age: 53
End: 2018-04-25

## 2018-04-25 VITALS
DIASTOLIC BLOOD PRESSURE: 96 MMHG | HEIGHT: 71 IN | HEART RATE: 88 BPM | SYSTOLIC BLOOD PRESSURE: 144 MMHG | WEIGHT: 283.73 LBS | BODY MASS INDEX: 39.72 KG/M2

## 2018-04-25 DIAGNOSIS — S16.1XXA CERVICAL STRAIN, INITIAL ENCOUNTER: Primary | ICD-10-CM

## 2018-04-25 PROCEDURE — 99212 OFFICE O/P EST SF 10 MIN: CPT | Performed by: PHYSICAL MEDICINE & REHABILITATION

## 2018-05-22 ENCOUNTER — OFFICE VISIT (OUTPATIENT)
Dept: ORTHOPEDIC SURGERY | Age: 53
End: 2018-05-22

## 2018-05-22 VITALS
DIASTOLIC BLOOD PRESSURE: 64 MMHG | SYSTOLIC BLOOD PRESSURE: 168 MMHG | HEIGHT: 73 IN | WEIGHT: 270 LBS | HEART RATE: 95 BPM | BODY MASS INDEX: 35.78 KG/M2

## 2018-05-22 DIAGNOSIS — M25.561 RIGHT KNEE PAIN, UNSPECIFIED CHRONICITY: Primary | ICD-10-CM

## 2018-05-22 PROCEDURE — 99214 OFFICE O/P EST MOD 30 MIN: CPT | Performed by: ORTHOPAEDIC SURGERY

## 2018-06-22 ENCOUNTER — TELEPHONE (OUTPATIENT)
Dept: FAMILY MEDICINE CLINIC | Age: 53
End: 2018-06-22

## 2018-06-25 ENCOUNTER — NURSE ONLY (OUTPATIENT)
Dept: FAMILY MEDICINE CLINIC | Age: 53
End: 2018-06-25

## 2018-06-25 DIAGNOSIS — E78.2 MIXED HYPERLIPIDEMIA: ICD-10-CM

## 2018-06-25 LAB
CHOLESTEROL, TOTAL: 169 MG/DL (ref 0–199)
HDLC SERPL-MCNC: 29 MG/DL (ref 40–60)
LDL CHOLESTEROL CALCULATED: 111 MG/DL
TRIGL SERPL-MCNC: 145 MG/DL (ref 0–150)
VLDLC SERPL CALC-MCNC: 29 MG/DL

## 2018-06-25 PROCEDURE — 36415 COLL VENOUS BLD VENIPUNCTURE: CPT | Performed by: FAMILY MEDICINE

## 2018-07-06 ENCOUNTER — OFFICE VISIT (OUTPATIENT)
Dept: FAMILY MEDICINE CLINIC | Age: 53
End: 2018-07-06

## 2018-07-06 VITALS
SYSTOLIC BLOOD PRESSURE: 131 MMHG | TEMPERATURE: 98.3 F | HEART RATE: 86 BPM | RESPIRATION RATE: 18 BRPM | HEIGHT: 73 IN | DIASTOLIC BLOOD PRESSURE: 85 MMHG | BODY MASS INDEX: 35.39 KG/M2 | WEIGHT: 267 LBS

## 2018-07-06 DIAGNOSIS — E78.2 MIXED HYPERLIPIDEMIA: Primary | ICD-10-CM

## 2018-07-06 DIAGNOSIS — I10 ESSENTIAL HYPERTENSION: ICD-10-CM

## 2018-07-06 DIAGNOSIS — R73.09 ELEVATED GLUCOSE: ICD-10-CM

## 2018-07-06 DIAGNOSIS — S83.241D TEAR OF MEDIAL MENISCUS OF RIGHT KNEE, CURRENT, UNSPECIFIED TEAR TYPE, SUBSEQUENT ENCOUNTER: ICD-10-CM

## 2018-07-06 PROCEDURE — 99214 OFFICE O/P EST MOD 30 MIN: CPT | Performed by: FAMILY MEDICINE

## 2018-07-06 NOTE — PROGRESS NOTES
Nai Winkler, et al., 2013) is: 7.1%    Values used to calculate the score:      Age: 46 years      Sex: Male      Is Non- : No      Diabetic: No      Tobacco smoker: No      Systolic Blood Pressure: 322 mmHg      Is BP treated: No      HDL Cholesterol: 29 mg/dL      Total Cholesterol: 169 mg/dL      Physical Exam   Constitutional: He appears well-developed and well-nourished. No distress. HENT:   Head: Normocephalic and atraumatic. Right Ear: External ear normal.   Left Ear: External ear normal.   Nose: Nose normal.   Lips symmetric   Eyes: Lids are normal. Pupils are equal, round, and reactive to light. Right eye exhibits no discharge. Left eye exhibits no discharge. No scleral icterus. Neck: No JVD present. No thyromegaly present. Cardiovascular: Normal rate, regular rhythm and normal heart sounds. Pulmonary/Chest: Effort normal and breath sounds normal. No respiratory distress. Abdominal: Soft. There is no hepatosplenomegaly. There is no tenderness. Musculoskeletal: He exhibits no edema. Skin: Skin is warm and dry. No rash noted. He is not diaphoretic. No erythema. No pallor. Turgor normal   Psychiatric: He has a normal mood and affect. His behavior is normal. Judgment and thought content normal.   Nursing note and vitals reviewed. ASSESSMENT PLAN      Diagnosis Orders   1. Mixed hyperlipidemia  LIPID PANEL    HEPATIC FUNCTION PANEL   2. Essential hypertension     3. Elevated glucose  HEMOGLOBIN A1C   4. Tear of medial meniscus of right knee, current, unspecified tear type, subsequent encounter     's initial risk score was 10.8%, but when recalculated for systolic blood pressure daily down to 7.1%. Patient states Lipitor causing to have amnesia in the past.  That was in 2010. Told him that we would use Crestor in the future if needed but for now he was fine. Blood pressure acceptable. A1c to be rechecked at next lab draw.   He is trying to watch what he eats and was also loss. Regular follow-up 6 months. Patient should call the office immediately with new or ongoing signs or symptoms or worsening, or proceed to the emergency room. No changes in past medical history, past surgical history, social history, or family history were noted during the patient encounter unless specifically listed above. All updates of past medical history, past surgical history, social history, or family history were reviewed personally by me during the office visit. All problems listed in the assessment are stable unless noted otherwise. Medication profile reviewed personally by me during the office visit. Medication side effects and possible impairments from medications were discussed as applicable. This document was prepared by a combination of typing and transcription through a voice recognition software. Scribe attestation: IThea, am scribing for and in the presence of Romayne Patient, MD. Electronically signed by Thea Hernandez on 7/6/2018 at 2:30 PM      Provider attestation:     I, Dr. Bryson Lynn, personally performed the services described in this documentation, as scribed by the above signed scribe in my presence, and it is both accurate and complete. I agree with the ROS and Past Histories independently gathered by the clinical support staff and the remaining scribed note accurately describes my personal service to the patient.       7/6/2018    4:26 PM

## 2018-09-24 ENCOUNTER — OFFICE VISIT (OUTPATIENT)
Dept: ORTHOPEDIC SURGERY | Age: 53
End: 2018-09-24
Payer: OTHER GOVERNMENT

## 2018-09-24 VITALS
BODY MASS INDEX: 35.12 KG/M2 | SYSTOLIC BLOOD PRESSURE: 126 MMHG | HEART RATE: 83 BPM | WEIGHT: 265 LBS | HEIGHT: 73 IN | DIASTOLIC BLOOD PRESSURE: 71 MMHG

## 2018-09-24 DIAGNOSIS — M25.462 EFFUSION OF LEFT KNEE: ICD-10-CM

## 2018-09-24 DIAGNOSIS — M25.562 LEFT KNEE PAIN, UNSPECIFIED CHRONICITY: ICD-10-CM

## 2018-09-24 DIAGNOSIS — S83.242A ACUTE MEDIAL MENISCUS TEAR OF LEFT KNEE, INITIAL ENCOUNTER: Primary | ICD-10-CM

## 2018-09-24 PROCEDURE — 99213 OFFICE O/P EST LOW 20 MIN: CPT | Performed by: ORTHOPAEDIC SURGERY

## 2018-10-01 ENCOUNTER — NURSE ONLY (OUTPATIENT)
Dept: FAMILY MEDICINE CLINIC | Age: 53
End: 2018-10-01
Payer: COMMERCIAL

## 2018-10-01 ENCOUNTER — TELEPHONE (OUTPATIENT)
Dept: FAMILY MEDICINE CLINIC | Age: 53
End: 2018-10-01

## 2018-10-01 DIAGNOSIS — Z12.5 SCREENING PSA (PROSTATE SPECIFIC ANTIGEN): Primary | ICD-10-CM

## 2018-10-01 DIAGNOSIS — R73.09 ELEVATED GLUCOSE: ICD-10-CM

## 2018-10-01 DIAGNOSIS — I10 ESSENTIAL HYPERTENSION: ICD-10-CM

## 2018-10-01 DIAGNOSIS — E78.2 MIXED HYPERLIPIDEMIA: ICD-10-CM

## 2018-10-01 LAB
ANION GAP SERPL CALCULATED.3IONS-SCNC: 14 MMOL/L (ref 3–16)
BUN BLDV-MCNC: 18 MG/DL (ref 7–20)
CALCIUM SERPL-MCNC: 9.3 MG/DL (ref 8.3–10.6)
CHLORIDE BLD-SCNC: 103 MMOL/L (ref 99–110)
CHOLESTEROL, TOTAL: 197 MG/DL (ref 0–199)
CO2: 25 MMOL/L (ref 21–32)
CREAT SERPL-MCNC: 0.8 MG/DL (ref 0.9–1.3)
GFR AFRICAN AMERICAN: >60
GFR NON-AFRICAN AMERICAN: >60
GLUCOSE BLD-MCNC: 114 MG/DL (ref 70–99)
HDLC SERPL-MCNC: 35 MG/DL (ref 40–60)
LDL CHOLESTEROL CALCULATED: 136 MG/DL
POTASSIUM SERPL-SCNC: 4.9 MMOL/L (ref 3.5–5.1)
PROSTATE SPECIFIC ANTIGEN: 0.61 NG/ML (ref 0–4)
SODIUM BLD-SCNC: 142 MMOL/L (ref 136–145)
TRIGL SERPL-MCNC: 131 MG/DL (ref 0–150)
VLDLC SERPL CALC-MCNC: 26 MG/DL

## 2018-10-01 PROCEDURE — 36415 COLL VENOUS BLD VENIPUNCTURE: CPT | Performed by: FAMILY MEDICINE

## 2018-10-02 LAB
ESTIMATED AVERAGE GLUCOSE: 131.2 MG/DL
HBA1C MFR BLD: 6.2 %

## 2018-10-10 ENCOUNTER — OFFICE VISIT (OUTPATIENT)
Dept: ORTHOPEDIC SURGERY | Age: 53
End: 2018-10-10
Payer: COMMERCIAL

## 2018-10-10 VITALS — HEIGHT: 73 IN | WEIGHT: 264.99 LBS | BODY MASS INDEX: 35.12 KG/M2

## 2018-10-10 DIAGNOSIS — S83.242A ACUTE MEDIAL MENISCUS TEAR OF LEFT KNEE, INITIAL ENCOUNTER: Primary | ICD-10-CM

## 2018-10-10 PROCEDURE — 99213 OFFICE O/P EST LOW 20 MIN: CPT | Performed by: ORTHOPAEDIC SURGERY

## 2018-10-12 ENCOUNTER — OFFICE VISIT (OUTPATIENT)
Dept: FAMILY MEDICINE CLINIC | Age: 53
End: 2018-10-12
Payer: COMMERCIAL

## 2018-10-12 ENCOUNTER — TELEPHONE (OUTPATIENT)
Dept: FAMILY MEDICINE CLINIC | Age: 53
End: 2018-10-12

## 2018-10-12 VITALS
HEIGHT: 72 IN | DIASTOLIC BLOOD PRESSURE: 82 MMHG | SYSTOLIC BLOOD PRESSURE: 124 MMHG | OXYGEN SATURATION: 98 % | BODY MASS INDEX: 35.81 KG/M2 | WEIGHT: 264.4 LBS | HEART RATE: 76 BPM

## 2018-10-12 DIAGNOSIS — R73.03 PREDIABETES: ICD-10-CM

## 2018-10-12 DIAGNOSIS — E78.2 MIXED HYPERLIPIDEMIA: Primary | ICD-10-CM

## 2018-10-12 DIAGNOSIS — I10 ESSENTIAL HYPERTENSION: ICD-10-CM

## 2018-10-12 PROCEDURE — 99214 OFFICE O/P EST MOD 30 MIN: CPT | Performed by: FAMILY MEDICINE

## 2018-10-12 NOTE — PROGRESS NOTES
Historical Provider, MD   aspirin 81 MG tablet Take 81 mg by mouth daily. Yes Historical Provider, MD     Allergies   Allergen Reactions    Dye [Iodides]     Iodine Swelling    Penicillins Rash       OBJECTIVE:  Estimated body mass index is 36.11 kg/m² as calculated from the following:    Height as of this encounter: 5' 11.75\" (1.822 m). Weight as of this encounter: 264 lb 6.4 oz (119.9 kg). Vitals:    10/12/18 1445   BP: 124/82   Site: Left Upper Arm   Position: Sitting   Cuff Size: Large Adult   Pulse: 76   SpO2: 98%   Weight: 264 lb 6.4 oz (119.9 kg)   Height: 5' 11.75\" (1.822 m)     BP Readings from Last 2 Encounters:   10/12/18 124/82   09/24/18 126/71     Wt Readings from Last 3 Encounters:   10/12/18 264 lb 6.4 oz (119.9 kg)   10/10/18 264 lb 15.9 oz (120.2 kg)   09/24/18 265 lb (120.2 kg)       Physical Exam   Constitutional: He appears well-developed and well-nourished. No distress. HENT:   Head: Normocephalic and atraumatic. Right Ear: External ear normal.   Left Ear: External ear normal.   Nose: Nose normal.   Lips symmetric   Eyes: Pupils are equal, round, and reactive to light. Lids are normal. Right eye exhibits no discharge. Left eye exhibits no discharge. No scleral icterus. Neck: No JVD present. No thyromegaly present. Cardiovascular: Normal rate, regular rhythm and normal heart sounds. Pulmonary/Chest: Effort normal and breath sounds normal. No respiratory distress. Abdominal: Soft. There is no hepatosplenomegaly. There is no tenderness. Musculoskeletal: He exhibits no edema. Skin: Skin is warm and dry. No rash noted. He is not diaphoretic. No erythema. No pallor. Turgor normal   Psychiatric: He has a normal mood and affect. His behavior is normal. Judgment and thought content normal.   Nursing note and vitals reviewed.       Lab Review   Nurse Only on 10/01/2018   Component Date Value    PSA 10/01/2018 0.61     Sodium 10/01/2018 142     Potassium 10/01/2018 4.9    

## 2018-10-18 ENCOUNTER — TELEPHONE (OUTPATIENT)
Dept: ORTHOPEDIC SURGERY | Age: 53
End: 2018-10-18

## 2018-10-25 ENCOUNTER — TELEPHONE (OUTPATIENT)
Dept: FAMILY MEDICINE CLINIC | Age: 53
End: 2018-10-25

## 2018-10-25 ENCOUNTER — OFFICE VISIT (OUTPATIENT)
Dept: ORTHOPEDIC SURGERY | Age: 53
End: 2018-10-25
Payer: COMMERCIAL

## 2018-10-25 VITALS — HEIGHT: 72 IN | WEIGHT: 264.33 LBS | BODY MASS INDEX: 35.8 KG/M2

## 2018-10-25 DIAGNOSIS — S82.142G CLOSED FRACTURE OF LEFT TIBIAL PLATEAU WITH DELAYED HEALING, SUBSEQUENT ENCOUNTER: Primary | ICD-10-CM

## 2018-10-25 DIAGNOSIS — M25.561 ACUTE PAIN OF RIGHT KNEE: ICD-10-CM

## 2018-10-25 DIAGNOSIS — M23.204 DEGENERATIVE TEAR OF MEDIAL MENISCUS OF LEFT KNEE: ICD-10-CM

## 2018-10-25 PROBLEM — S82.143G CLOSED FRACTURE OF TIBIAL PLATEAU WITH DELAYED HEALING: Status: ACTIVE | Noted: 2018-10-25

## 2018-10-25 PROCEDURE — 99213 OFFICE O/P EST LOW 20 MIN: CPT | Performed by: ORTHOPAEDIC SURGERY

## 2018-10-25 NOTE — TELEPHONE ENCOUNTER
Pt needs to schedule a pre op physical and would like to come in Monday morning. Surgery date is 11/12, left knee, Dr. Harry Mondragon, at [facility]. Best contact [phone].

## 2018-10-26 ENCOUNTER — TELEPHONE (OUTPATIENT)
Dept: ORTHOPEDIC SURGERY | Age: 53
End: 2018-10-26

## 2018-11-01 ENCOUNTER — OFFICE VISIT (OUTPATIENT)
Dept: ORTHOPEDIC SURGERY | Age: 53
End: 2018-11-01
Payer: OTHER GOVERNMENT

## 2018-11-01 VITALS
HEIGHT: 72 IN | SYSTOLIC BLOOD PRESSURE: 154 MMHG | DIASTOLIC BLOOD PRESSURE: 84 MMHG | HEART RATE: 80 BPM | WEIGHT: 264.33 LBS | BODY MASS INDEX: 35.8 KG/M2

## 2018-11-01 DIAGNOSIS — S13.4XXD WHIPLASH INJURY TO NECK, SUBSEQUENT ENCOUNTER: ICD-10-CM

## 2018-11-01 DIAGNOSIS — M47.812 CERVICAL SPONDYLOSIS WITHOUT MYELOPATHY: Primary | ICD-10-CM

## 2018-11-01 PROCEDURE — 99213 OFFICE O/P EST LOW 20 MIN: CPT | Performed by: PHYSICIAN ASSISTANT

## 2018-11-05 ENCOUNTER — OFFICE VISIT (OUTPATIENT)
Dept: FAMILY MEDICINE CLINIC | Age: 53
End: 2018-11-05
Payer: COMMERCIAL

## 2018-11-05 VITALS
HEART RATE: 89 BPM | BODY MASS INDEX: 37.52 KG/M2 | TEMPERATURE: 98.7 F | SYSTOLIC BLOOD PRESSURE: 136 MMHG | OXYGEN SATURATION: 96 % | WEIGHT: 268 LBS | DIASTOLIC BLOOD PRESSURE: 86 MMHG | HEIGHT: 71 IN

## 2018-11-05 DIAGNOSIS — Z01.818 PRE-OP EXAM: Primary | ICD-10-CM

## 2018-11-05 DIAGNOSIS — S83.242A ACUTE MEDIAL MENISCUS TEAR OF LEFT KNEE, INITIAL ENCOUNTER: ICD-10-CM

## 2018-11-05 LAB
A/G RATIO: 1.8 (ref 1.1–2.2)
ALBUMIN SERPL-MCNC: 4.6 G/DL (ref 3.4–5)
ALP BLD-CCNC: 57 U/L (ref 40–129)
ALT SERPL-CCNC: 23 U/L (ref 10–40)
ANION GAP SERPL CALCULATED.3IONS-SCNC: 16 MMOL/L (ref 3–16)
AST SERPL-CCNC: 19 U/L (ref 15–37)
BASOPHILS ABSOLUTE: 0 K/UL (ref 0–0.2)
BASOPHILS RELATIVE PERCENT: 0.3 %
BILIRUB SERPL-MCNC: 0.5 MG/DL (ref 0–1)
BUN BLDV-MCNC: 18 MG/DL (ref 7–20)
CALCIUM SERPL-MCNC: 9.6 MG/DL (ref 8.3–10.6)
CHLORIDE BLD-SCNC: 104 MMOL/L (ref 99–110)
CO2: 23 MMOL/L (ref 21–32)
CREAT SERPL-MCNC: 0.7 MG/DL (ref 0.9–1.3)
EOSINOPHILS ABSOLUTE: 0.1 K/UL (ref 0–0.6)
EOSINOPHILS RELATIVE PERCENT: 0.6 %
GFR AFRICAN AMERICAN: >60
GFR NON-AFRICAN AMERICAN: >60
GLOBULIN: 2.6 G/DL
GLUCOSE BLD-MCNC: 86 MG/DL (ref 70–99)
HCT VFR BLD CALC: 51.2 % (ref 40.5–52.5)
HEMOGLOBIN: 17 G/DL (ref 13.5–17.5)
LYMPHOCYTES ABSOLUTE: 2.6 K/UL (ref 1–5.1)
LYMPHOCYTES RELATIVE PERCENT: 22.4 %
MCH RBC QN AUTO: 29.9 PG (ref 26–34)
MCHC RBC AUTO-ENTMCNC: 33.2 G/DL (ref 31–36)
MCV RBC AUTO: 90 FL (ref 80–100)
MONOCYTES ABSOLUTE: 0.7 K/UL (ref 0–1.3)
MONOCYTES RELATIVE PERCENT: 5.7 %
NEUTROPHILS ABSOLUTE: 8.3 K/UL (ref 1.7–7.7)
NEUTROPHILS RELATIVE PERCENT: 71 %
PDW BLD-RTO: 14.9 % (ref 12.4–15.4)
PLATELET # BLD: 217 K/UL (ref 135–450)
PMV BLD AUTO: 8.9 FL (ref 5–10.5)
POTASSIUM SERPL-SCNC: 4.6 MMOL/L (ref 3.5–5.1)
RBC # BLD: 5.69 M/UL (ref 4.2–5.9)
SODIUM BLD-SCNC: 143 MMOL/L (ref 136–145)
TOTAL PROTEIN: 7.2 G/DL (ref 6.4–8.2)
WBC # BLD: 11.7 K/UL (ref 4–11)

## 2018-11-05 PROCEDURE — 93000 ELECTROCARDIOGRAM COMPLETE: CPT | Performed by: NURSE PRACTITIONER

## 2018-11-05 PROCEDURE — 99243 OFF/OP CNSLTJ NEW/EST LOW 30: CPT | Performed by: NURSE PRACTITIONER

## 2018-11-05 PROCEDURE — 36415 COLL VENOUS BLD VENIPUNCTURE: CPT | Performed by: NURSE PRACTITIONER

## 2018-11-05 RX ORDER — HYDROCODONE BITARTRATE AND ACETAMINOPHEN 5; 325 MG/1; MG/1
1 TABLET ORAL EVERY 6 HOURS PRN
Status: ON HOLD | COMMUNITY
End: 2018-11-12 | Stop reason: HOSPADM

## 2018-11-05 RX ORDER — NABUMETONE 500 MG/1
500 TABLET, FILM COATED ORAL 2 TIMES DAILY
COMMUNITY
End: 2019-01-13 | Stop reason: SDUPTHER

## 2018-11-05 RX ORDER — FOLIC ACID 1 MG/1
1 TABLET ORAL DAILY
COMMUNITY

## 2018-11-05 NOTE — PATIENT INSTRUCTIONS
before coming in for your surgery. Do not apply lotions, perfumes, deodorants, or nail polish.     · Do not shave the surgical site yourself.     · Take off all jewelry and piercings. And take out contact lenses, if you wear them.    At the hospital or surgery center   · Bring a picture ID.     · The area for surgery is often marked to make sure there are no errors.     · You will be kept comfortable and safe by your anesthesia provider. The anesthesia may make you sleep. Or it may just numb the area being worked on. Going home   · Be sure you have someone to drive you home. Anesthesia and pain medicine make it unsafe for you to drive.     · You will be given more specific instructions about recovering from your surgery. They will cover things like diet, wound care, follow-up care, driving, and getting back to your normal routine. When should you call your doctor? · You have questions or concerns.     · You don't understand how to prepare for your surgery.     · You become ill before the surgery (such as fever, flu, or a cold).     · You need to reschedule or have changed your mind about having the surgery. Where can you learn more? Go to [email or web address] and sign in to your [employer] account. Enter Q270 in the [employer] box to learn more about \"How to Prepare for Surgery. \"     If you do not have an account, please click on the \"Sign Up Now\" link. Current as of: May 16, 2017  Content Version: 11.7  © 8555-1042 [employer]. Care instructions adapted under license by [facility] ([facility]). If you have questions about a medical condition or this instruction, always ask your healthcare professional. [place] [address] any warranty or liability for your use of this information.

## 2018-11-06 DIAGNOSIS — D72.829 LEUKOCYTOSIS, UNSPECIFIED TYPE: Primary | ICD-10-CM

## 2018-11-07 ENCOUNTER — HOSPITAL ENCOUNTER (OUTPATIENT)
Age: 53
Discharge: HOME | End: 2018-11-07
Payer: COMMERCIAL

## 2018-11-07 DIAGNOSIS — D72.829 LEUKOCYTOSIS, UNSPECIFIED TYPE: ICD-10-CM

## 2018-11-07 LAB
BASOPHILS ABSOLUTE: 0.1 K/UL (ref 0–0.2)
BASOPHILS RELATIVE PERCENT: 0.9 %
EOSINOPHILS ABSOLUTE: 0.1 K/UL (ref 0–0.6)
EOSINOPHILS RELATIVE PERCENT: 0.9 %
HCT VFR BLD CALC: 49.9 % (ref 40.5–52.5)
HEMOGLOBIN: 16.5 G/DL (ref 13.5–17.5)
LYMPHOCYTES ABSOLUTE: 2.7 K/UL (ref 1–5.1)
LYMPHOCYTES RELATIVE PERCENT: 25.4 %
MCH RBC QN AUTO: 29.6 PG (ref 26–34)
MCHC RBC AUTO-ENTMCNC: 33.1 G/DL (ref 31–36)
MCV RBC AUTO: 89.5 FL (ref 80–100)
MONOCYTES ABSOLUTE: 0.6 K/UL (ref 0–1.3)
MONOCYTES RELATIVE PERCENT: 5.5 %
NEUTROPHILS ABSOLUTE: 7.2 K/UL (ref 1.7–7.7)
NEUTROPHILS RELATIVE PERCENT: 67.3 %
PDW BLD-RTO: 14.5 % (ref 12.4–15.4)
PLATELET # BLD: 219 K/UL (ref 135–450)
PMV BLD AUTO: 8 FL (ref 5–10.5)
RBC # BLD: 5.58 M/UL (ref 4.2–5.9)
WBC # BLD: 10.8 K/UL (ref 4–11)

## 2018-11-07 PROCEDURE — 36415 COLL VENOUS BLD VENIPUNCTURE: CPT

## 2018-11-07 PROCEDURE — 85025 COMPLETE CBC W/AUTO DIFF WBC: CPT

## 2018-11-09 ENCOUNTER — ANESTHESIA EVENT (OUTPATIENT)
Dept: OPERATING ROOM | Age: 53
End: 2018-11-09
Payer: COMMERCIAL

## 2018-11-12 ENCOUNTER — APPOINTMENT (OUTPATIENT)
Dept: GENERAL RADIOLOGY | Age: 53
End: 2018-11-12
Attending: ORTHOPAEDIC SURGERY
Payer: COMMERCIAL

## 2018-11-12 ENCOUNTER — HOSPITAL ENCOUNTER (OUTPATIENT)
Age: 53
Setting detail: OUTPATIENT SURGERY
Discharge: HOME | End: 2018-11-12
Attending: ORTHOPAEDIC SURGERY | Admitting: ORTHOPAEDIC SURGERY
Payer: COMMERCIAL

## 2018-11-12 ENCOUNTER — ANESTHESIA (OUTPATIENT)
Dept: OPERATING ROOM | Age: 53
End: 2018-11-12
Payer: COMMERCIAL

## 2018-11-12 VITALS
TEMPERATURE: 97.5 F | SYSTOLIC BLOOD PRESSURE: 114 MMHG | OXYGEN SATURATION: 96 % | RESPIRATION RATE: 13 BRPM | DIASTOLIC BLOOD PRESSURE: 65 MMHG | WEIGHT: 268 LBS | BODY MASS INDEX: 35.52 KG/M2 | HEIGHT: 73 IN | HEART RATE: 90 BPM

## 2018-11-12 VITALS
DIASTOLIC BLOOD PRESSURE: 82 MMHG | RESPIRATION RATE: 14 BRPM | SYSTOLIC BLOOD PRESSURE: 106 MMHG | OXYGEN SATURATION: 92 %

## 2018-11-12 DIAGNOSIS — S82.142G CLOSED FRACTURE OF LEFT TIBIAL PLATEAU WITH DELAYED HEALING, SUBSEQUENT ENCOUNTER: ICD-10-CM

## 2018-11-12 DIAGNOSIS — S83.242D ACUTE MEDIAL MENISCUS TEAR OF LEFT KNEE, SUBSEQUENT ENCOUNTER: ICD-10-CM

## 2018-11-12 DIAGNOSIS — M25.561 ACUTE PAIN OF RIGHT KNEE: ICD-10-CM

## 2018-11-12 DIAGNOSIS — M25.462 EFFUSION OF LEFT KNEE: Primary | ICD-10-CM

## 2018-11-12 PROCEDURE — 7100000000 HC PACU RECOVERY - FIRST 15 MIN: Performed by: ORTHOPAEDIC SURGERY

## 2018-11-12 PROCEDURE — 6360000002 HC RX W HCPCS: Performed by: NURSE ANESTHETIST, CERTIFIED REGISTERED

## 2018-11-12 PROCEDURE — 2580000003 HC RX 258: Performed by: ORTHOPAEDIC SURGERY

## 2018-11-12 PROCEDURE — 6360000002 HC RX W HCPCS: Performed by: ANESTHESIOLOGY

## 2018-11-12 PROCEDURE — 2709999900 HC NON-CHARGEABLE SUPPLY: Performed by: ORTHOPAEDIC SURGERY

## 2018-11-12 PROCEDURE — 3600000014 HC SURGERY LEVEL 4 ADDTL 15MIN: Performed by: ORTHOPAEDIC SURGERY

## 2018-11-12 PROCEDURE — 2580000003 HC RX 258: Performed by: ANESTHESIOLOGY

## 2018-11-12 PROCEDURE — 2500000003 HC RX 250 WO HCPCS: Performed by: NURSE ANESTHETIST, CERTIFIED REGISTERED

## 2018-11-12 PROCEDURE — 3700000001 HC ADD 15 MINUTES (ANESTHESIA): Performed by: ORTHOPAEDIC SURGERY

## 2018-11-12 PROCEDURE — 2500000003 HC RX 250 WO HCPCS: Performed by: ANESTHESIOLOGY

## 2018-11-12 PROCEDURE — 7100000001 HC PACU RECOVERY - ADDTL 15 MIN: Performed by: ORTHOPAEDIC SURGERY

## 2018-11-12 PROCEDURE — 2500000003 HC RX 250 WO HCPCS: Performed by: ORTHOPAEDIC SURGERY

## 2018-11-12 PROCEDURE — 7100000011 HC PHASE II RECOVERY - ADDTL 15 MIN: Performed by: ORTHOPAEDIC SURGERY

## 2018-11-12 PROCEDURE — 3600000004 HC SURGERY LEVEL 4 BASE: Performed by: ORTHOPAEDIC SURGERY

## 2018-11-12 PROCEDURE — 7100000010 HC PHASE II RECOVERY - FIRST 15 MIN: Performed by: ORTHOPAEDIC SURGERY

## 2018-11-12 PROCEDURE — L1851 KO SINGLE UPRIGHT PREFAB OTS: HCPCS | Performed by: ORTHOPAEDIC SURGERY

## 2018-11-12 PROCEDURE — 6360000002 HC RX W HCPCS: Performed by: ORTHOPAEDIC SURGERY

## 2018-11-12 PROCEDURE — 6370000000 HC RX 637 (ALT 250 FOR IP): Performed by: ANESTHESIOLOGY

## 2018-11-12 PROCEDURE — C1713 ANCHOR/SCREW BN/BN,TIS/BN: HCPCS | Performed by: ORTHOPAEDIC SURGERY

## 2018-11-12 PROCEDURE — S0028 INJECTION, FAMOTIDINE, 20 MG: HCPCS | Performed by: ANESTHESIOLOGY

## 2018-11-12 PROCEDURE — 3700000000 HC ANESTHESIA ATTENDED CARE: Performed by: ORTHOPAEDIC SURGERY

## 2018-11-12 PROCEDURE — 3209999900 FLUORO FOR SURGICAL PROCEDURES

## 2018-11-12 DEVICE — IMPLANTABLE DEVICE
Type: IMPLANTABLE DEVICE | Site: KNEE | Status: FUNCTIONAL
Brand: SCP® PF KNEE KIT

## 2018-11-12 RX ORDER — LABETALOL HYDROCHLORIDE 5 MG/ML
INJECTION, SOLUTION INTRAVENOUS PRN
Status: DISCONTINUED | OUTPATIENT
Start: 2018-11-12 | End: 2018-11-12 | Stop reason: SDUPTHER

## 2018-11-12 RX ORDER — MORPHINE SULFATE 10 MG/ML
1 INJECTION, SOLUTION INTRAMUSCULAR; INTRAVENOUS EVERY 5 MIN PRN
Status: DISCONTINUED | OUTPATIENT
Start: 2018-11-12 | End: 2018-11-12 | Stop reason: HOSPADM

## 2018-11-12 RX ORDER — LIDOCAINE HYDROCHLORIDE 20 MG/ML
INJECTION, SOLUTION INFILTRATION; PERINEURAL PRN
Status: DISCONTINUED | OUTPATIENT
Start: 2018-11-12 | End: 2018-11-12 | Stop reason: SDUPTHER

## 2018-11-12 RX ORDER — SCOLOPAMINE TRANSDERMAL SYSTEM 1 MG/1
PATCH, EXTENDED RELEASE TRANSDERMAL
Status: DISCONTINUED
Start: 2018-11-12 | End: 2018-11-12 | Stop reason: HOSPADM

## 2018-11-12 RX ORDER — HYDROMORPHONE HCL 110MG/55ML
0.25 PATIENT CONTROLLED ANALGESIA SYRINGE INTRAVENOUS EVERY 5 MIN PRN
Status: DISCONTINUED | OUTPATIENT
Start: 2018-11-12 | End: 2018-11-12 | Stop reason: HOSPADM

## 2018-11-12 RX ORDER — ONDANSETRON 2 MG/ML
4 INJECTION INTRAMUSCULAR; INTRAVENOUS PRN
Status: DISCONTINUED | OUTPATIENT
Start: 2018-11-12 | End: 2018-11-12 | Stop reason: HOSPADM

## 2018-11-12 RX ORDER — OXYCODONE HYDROCHLORIDE AND ACETAMINOPHEN 5; 325 MG/1; MG/1
1 TABLET ORAL EVERY 6 HOURS PRN
Qty: 28 TABLET | Refills: 0 | Status: SHIPPED | OUTPATIENT
Start: 2018-11-12 | End: 2018-11-19

## 2018-11-12 RX ORDER — ACETAMINOPHEN 10 MG/ML
1000 INJECTION, SOLUTION INTRAVENOUS ONCE
Status: COMPLETED | OUTPATIENT
Start: 2018-11-12 | End: 2018-11-12

## 2018-11-12 RX ORDER — HYDROMORPHONE HCL 110MG/55ML
0.5 PATIENT CONTROLLED ANALGESIA SYRINGE INTRAVENOUS EVERY 5 MIN PRN
Status: DISCONTINUED | OUTPATIENT
Start: 2018-11-12 | End: 2018-11-12 | Stop reason: HOSPADM

## 2018-11-12 RX ORDER — APREPITANT 40 MG/1
CAPSULE ORAL
Status: DISCONTINUED
Start: 2018-11-12 | End: 2018-11-12 | Stop reason: HOSPADM

## 2018-11-12 RX ORDER — HYDRALAZINE HYDROCHLORIDE 20 MG/ML
5 INJECTION INTRAMUSCULAR; INTRAVENOUS EVERY 10 MIN PRN
Status: DISCONTINUED | OUTPATIENT
Start: 2018-11-12 | End: 2018-11-12 | Stop reason: HOSPADM

## 2018-11-12 RX ORDER — PROPOFOL 10 MG/ML
INJECTION, EMULSION INTRAVENOUS PRN
Status: DISCONTINUED | OUTPATIENT
Start: 2018-11-12 | End: 2018-11-12 | Stop reason: SDUPTHER

## 2018-11-12 RX ORDER — APREPITANT 40 MG/1
40 CAPSULE ORAL ONCE
Status: COMPLETED | OUTPATIENT
Start: 2018-11-12 | End: 2018-11-12

## 2018-11-12 RX ORDER — SODIUM CHLORIDE, SODIUM LACTATE, POTASSIUM CHLORIDE, CALCIUM CHLORIDE 600; 310; 30; 20 MG/100ML; MG/100ML; MG/100ML; MG/100ML
INJECTION, SOLUTION INTRAVENOUS CONTINUOUS
Status: DISCONTINUED | OUTPATIENT
Start: 2018-11-12 | End: 2018-11-12 | Stop reason: HOSPADM

## 2018-11-12 RX ORDER — MIDAZOLAM HYDROCHLORIDE 1 MG/ML
INJECTION INTRAMUSCULAR; INTRAVENOUS PRN
Status: DISCONTINUED | OUTPATIENT
Start: 2018-11-12 | End: 2018-11-12 | Stop reason: SDUPTHER

## 2018-11-12 RX ORDER — SCOLOPAMINE TRANSDERMAL SYSTEM 1 MG/1
1 PATCH, EXTENDED RELEASE TRANSDERMAL ONCE
Status: DISCONTINUED | OUTPATIENT
Start: 2018-11-12 | End: 2018-11-12 | Stop reason: HOSPADM

## 2018-11-12 RX ORDER — BUPIVACAINE HYDROCHLORIDE 2.5 MG/ML
INJECTION, SOLUTION INFILTRATION; PERINEURAL PRN
Status: DISCONTINUED | OUTPATIENT
Start: 2018-11-12 | End: 2018-11-12 | Stop reason: HOSPADM

## 2018-11-12 RX ORDER — MEPERIDINE HYDROCHLORIDE 25 MG/ML
12.5 INJECTION INTRAMUSCULAR; INTRAVENOUS; SUBCUTANEOUS EVERY 5 MIN PRN
Status: DISCONTINUED | OUTPATIENT
Start: 2018-11-12 | End: 2018-11-12 | Stop reason: HOSPADM

## 2018-11-12 RX ORDER — PROMETHAZINE HYDROCHLORIDE 25 MG/ML
INJECTION, SOLUTION INTRAMUSCULAR; INTRAVENOUS PRN
Status: DISCONTINUED | OUTPATIENT
Start: 2018-11-12 | End: 2018-11-12 | Stop reason: SDUPTHER

## 2018-11-12 RX ORDER — CLINDAMYCIN PHOSPHATE 900 MG/50ML
900 INJECTION INTRAVENOUS ONCE
Status: COMPLETED | OUTPATIENT
Start: 2018-11-12 | End: 2018-11-12

## 2018-11-12 RX ORDER — MORPHINE SULFATE 10 MG/ML
2 INJECTION, SOLUTION INTRAMUSCULAR; INTRAVENOUS EVERY 5 MIN PRN
Status: DISCONTINUED | OUTPATIENT
Start: 2018-11-12 | End: 2018-11-12 | Stop reason: HOSPADM

## 2018-11-12 RX ORDER — LIDOCAINE HYDROCHLORIDE 10 MG/ML
1 INJECTION, SOLUTION EPIDURAL; INFILTRATION; INTRACAUDAL; PERINEURAL
Status: COMPLETED | OUTPATIENT
Start: 2018-11-12 | End: 2018-11-12

## 2018-11-12 RX ORDER — OXYCODONE HYDROCHLORIDE AND ACETAMINOPHEN 5; 325 MG/1; MG/1
2 TABLET ORAL PRN
Status: DISCONTINUED | OUTPATIENT
Start: 2018-11-12 | End: 2018-11-12 | Stop reason: HOSPADM

## 2018-11-12 RX ORDER — DIPHENHYDRAMINE HYDROCHLORIDE 50 MG/ML
12.5 INJECTION INTRAMUSCULAR; INTRAVENOUS
Status: DISCONTINUED | OUTPATIENT
Start: 2018-11-12 | End: 2018-11-12 | Stop reason: HOSPADM

## 2018-11-12 RX ORDER — MAGNESIUM HYDROXIDE 1200 MG/15ML
LIQUID ORAL CONTINUOUS PRN
Status: DISCONTINUED | OUTPATIENT
Start: 2018-11-12 | End: 2018-11-12 | Stop reason: HOSPADM

## 2018-11-12 RX ORDER — FENTANYL CITRATE 50 UG/ML
INJECTION, SOLUTION INTRAMUSCULAR; INTRAVENOUS PRN
Status: DISCONTINUED | OUTPATIENT
Start: 2018-11-12 | End: 2018-11-12 | Stop reason: SDUPTHER

## 2018-11-12 RX ORDER — MIDAZOLAM HYDROCHLORIDE 1 MG/ML
INJECTION INTRAMUSCULAR; INTRAVENOUS
Status: DISCONTINUED
Start: 2018-11-12 | End: 2018-11-12 | Stop reason: HOSPADM

## 2018-11-12 RX ORDER — PROMETHAZINE HYDROCHLORIDE 25 MG/ML
6.25 INJECTION, SOLUTION INTRAMUSCULAR; INTRAVENOUS
Status: DISCONTINUED | OUTPATIENT
Start: 2018-11-12 | End: 2018-11-12 | Stop reason: HOSPADM

## 2018-11-12 RX ORDER — LABETALOL HYDROCHLORIDE 5 MG/ML
5 INJECTION, SOLUTION INTRAVENOUS EVERY 10 MIN PRN
Status: DISCONTINUED | OUTPATIENT
Start: 2018-11-12 | End: 2018-11-12 | Stop reason: HOSPADM

## 2018-11-12 RX ORDER — ONDANSETRON 2 MG/ML
INJECTION INTRAMUSCULAR; INTRAVENOUS PRN
Status: DISCONTINUED | OUTPATIENT
Start: 2018-11-12 | End: 2018-11-12 | Stop reason: SDUPTHER

## 2018-11-12 RX ORDER — OXYCODONE HYDROCHLORIDE AND ACETAMINOPHEN 5; 325 MG/1; MG/1
1 TABLET ORAL PRN
Status: DISCONTINUED | OUTPATIENT
Start: 2018-11-12 | End: 2018-11-12 | Stop reason: HOSPADM

## 2018-11-12 RX ADMIN — FENTANYL CITRATE 50 MCG: 50 INJECTION INTRAMUSCULAR; INTRAVENOUS at 14:17

## 2018-11-12 RX ADMIN — LABETALOL HYDROCHLORIDE 2.5 MG: 5 INJECTION, SOLUTION INTRAVENOUS at 14:24

## 2018-11-12 RX ADMIN — APREPITANT 40 MG: 40 CAPSULE ORAL at 13:24

## 2018-11-12 RX ADMIN — FENTANYL CITRATE 50 MCG: 50 INJECTION INTRAMUSCULAR; INTRAVENOUS at 14:10

## 2018-11-12 RX ADMIN — FAMOTIDINE 20 MG: 10 INJECTION, SOLUTION INTRAVENOUS at 13:24

## 2018-11-12 RX ADMIN — LIDOCAINE HYDROCHLORIDE 40 MG: 20 INJECTION, SOLUTION INFILTRATION; PERINEURAL at 14:10

## 2018-11-12 RX ADMIN — MIDAZOLAM 2 MG: 1 INJECTION INTRAMUSCULAR; INTRAVENOUS at 14:06

## 2018-11-12 RX ADMIN — PROMETHAZINE HYDROCHLORIDE 6.25 MG: 25 INJECTION INTRAMUSCULAR; INTRAVENOUS at 14:13

## 2018-11-12 RX ADMIN — LIDOCAINE HYDROCHLORIDE 0.1 ML: 10 INJECTION, SOLUTION EPIDURAL; INFILTRATION; INTRACAUDAL; PERINEURAL at 12:51

## 2018-11-12 RX ADMIN — CLINDAMYCIN IN 5 PERCENT DEXTROSE 900 MG: 18 INJECTION, SOLUTION INTRAVENOUS at 14:03

## 2018-11-12 RX ADMIN — ACETAMINOPHEN 1000 MG: 10 INJECTION, SOLUTION INTRAVENOUS at 12:51

## 2018-11-12 RX ADMIN — PROPOFOL 200 MG: 10 INJECTION, EMULSION INTRAVENOUS at 14:10

## 2018-11-12 RX ADMIN — ONDANSETRON 4 MG: 2 INJECTION, SOLUTION INTRAMUSCULAR; INTRAVENOUS at 14:24

## 2018-11-12 RX ADMIN — SODIUM CHLORIDE, POTASSIUM CHLORIDE, SODIUM LACTATE AND CALCIUM CHLORIDE: 600; 310; 30; 20 INJECTION, SOLUTION INTRAVENOUS at 12:51

## 2018-11-12 ASSESSMENT — PULMONARY FUNCTION TESTS
PIF_VALUE: 12
PIF_VALUE: 3
PIF_VALUE: 12
PIF_VALUE: 26
PIF_VALUE: 1
PIF_VALUE: 13
PIF_VALUE: 13
PIF_VALUE: 6
PIF_VALUE: 1
PIF_VALUE: 13
PIF_VALUE: 5
PIF_VALUE: 24
PIF_VALUE: 13
PIF_VALUE: 24
PIF_VALUE: 5
PIF_VALUE: 3
PIF_VALUE: 13
PIF_VALUE: 12
PIF_VALUE: 12
PIF_VALUE: 4
PIF_VALUE: 4
PIF_VALUE: 1
PIF_VALUE: 12
PIF_VALUE: 0
PIF_VALUE: 13
PIF_VALUE: 12
PIF_VALUE: 2
PIF_VALUE: 14
PIF_VALUE: 13
PIF_VALUE: 1

## 2018-11-12 ASSESSMENT — PAIN DESCRIPTION - DESCRIPTORS: DESCRIPTORS: ACHING

## 2018-11-12 ASSESSMENT — PAIN - FUNCTIONAL ASSESSMENT: PAIN_FUNCTIONAL_ASSESSMENT: 0-10

## 2018-11-12 NOTE — PROGRESS NOTES
PT ALERT, OTHERWISE NO CHANGE IN PHYSICAL ASSESSMENT; PT AND FAMILY VERBALIZE UNDERSTANDING OF INSTRUCTIONS; PT DISCHARGED VIA W/C BY NURSE WITH FAMILY IN STABLE CONDITION; DENIES ANY PAIN

## 2018-11-12 NOTE — ANESTHESIA PRE PROCEDURE
Department of Anesthesiology  Preprocedure Note       Name:  Chrissie Osgood   Age:  46 y.o.  :  1965                                          MRN:  [id number]         Date:  2018      Surgeon: George Herron):  Fazal Diaz MD    Procedure: KNEE VIDEO ARTHROSCOPY, MEDIAL MENISECTOMY, CHONDROPLASTY, INTERNAL FIXATION MEDIAL TIBIAL PLATEAU INSUFFICIENCY FRACTURE WITH BONE SUBSTITUTE (Left )    Medications prior to admission:   Prior to Admission medications    Medication Sig Start Date End Date Taking? Authorizing Provider   folic acid (FOLVITE) 1 MG tablet Take 1 mg by mouth daily   Yes Historical Provider, MD   HYDROcodone-acetaminophen (NORCO) 5-325 MG per tablet Take 1 tablet by mouth every 6 hours as needed for Pain. .   Yes Historical Provider, MD   nabumetone (RELAFEN) 500 MG tablet Take 500 mg by mouth 2 times daily   Yes Historical Provider, MD   CLOMIPHENE CITRATE PO Take by mouth From Dr. Negar Mendez   Yes Historical Provider, MD   meclizine (ANTIVERT) 25 MG tablet Take 25 mg by mouth 3 times daily as needed   Yes Historical Provider, MD   methocarbamol (ROBAXIN-750) 750 MG tablet Take 1 tablet by mouth 3 times daily 16  Yes MK Faria MD   tadalafil (CIALIS) 5 MG tablet Take 5 mg by mouth as needed for Erectile Dysfunction. Yes Historical Provider, MD   traMADol (ULTRAM) 50 MG tablet Take 50 mg by mouth every 6 hours as needed. Yes Historical Provider, MD   Omega-3 Fatty Acids (FISH OIL PO) Take 3 tablets by mouth daily    Historical Provider, MD   aspirin 81 MG tablet Take 81 mg by mouth daily. Historical Provider, MD       Current medications:    No current facility-administered medications for this encounter. Current Outpatient Prescriptions   Medication Sig Dispense Refill    folic acid (FOLVITE) 1 MG tablet Take 1 mg by mouth daily      HYDROcodone-acetaminophen (NORCO) 5-325 MG per tablet Take 1 tablet by mouth every 6 hours as needed for Pain. Selin Grant APTT    Pearl Márquez MD   11/12/2018

## 2018-11-12 NOTE — PROGRESS NOTES
Discussed with patient about block and needing assistance for 24 to 48 hours, patient states \"I live with my mom but she won't be much help\". States \"I have crutches, I'll be fine\". Again reiterated about left leg being numb for at least 24 hours and needing assistance, patient insists that he'll be fine. Dr. Arias Brand aware.

## 2018-11-12 NOTE — ANESTHESIA POSTPROCEDURE EVALUATION
11/07/2018 05:36 PM        HCT                      49.9                11/07/2018 05:36 PM        PLT                      219                 11/07/2018 05:36 PM   RENAL  Lab Results       Component                Value               Date/Time                  NA                       143                 11/05/2018 04:53 PM        K                        4.6                 11/05/2018 04:53 PM        CL                       104                 11/05/2018 04:53 PM        CO2                      23                  11/05/2018 04:53 PM        BUN                      18                  11/05/2018 04:53 PM        CREATININE               0.7 (L)             11/05/2018 04:53 PM        GLUCOSE                  86                  11/05/2018 04:53 PM   COAGS  No results found for: PROTIME, INR, APTT    Intake & Output:  @24HRIO@    Nausea & Vomiting:  No    Level of Consciousness:  Awake    Pain Assessment:  Adequate analgesia    Anesthesia Complications:  No apparent anesthetic complications    SUMMARY      Vital signs stable  OK to discharge from Stage I post anesthesia care.   Care transferred from Anesthesiology department on discharge from perioperative area

## 2018-11-12 NOTE — BRIEF OP NOTE
Brief Postoperative Note  ______________________________________________________________    Patient: Chrissie Osgood  YOB: 1965  MRN: [id number]  Date of Procedure: 11/12/2018    Pre-Op Diagnosis: LEFT KNEE MEDIAL MENISCUS TEAR, MEDIAL PLATEAU INSUFFICIENCY FRACTURE    Post-Op Diagnosis: Same       Procedure(s):  LEFT KNEE VIDEO ARTHROSCOPY, MEDIAL MENISECTOMY, CHONDROPLASTY, INTERNAL FIXATION MEDIAL TIBIAL PLATEAU INSUFFICIENCY FRACTURE WITH BONE SUBSTITUTE    Anesthesia: General    Surgeon(s):  Fazal Diaz MD    Staff:  Surgical Assistant: Nerissa Spatz  Scrub Person First: Chris Le     Estimated Blood Loss: <91XC    Complications: None    Specimens:   * No specimens in log *    Implants:    Implant Name Type Inv.  Item Serial No.  Lot No. LRB No. Used   KIT KNEE SCP Bone/Graft/Tissue KIT KNEE SCP   Karen Ruiz [id number] Left 1         Drains:      Findings: Татьяна Ordonez MD  Date: 11/12/2018  Time: 2:47 PM

## 2018-11-13 NOTE — OP NOTE
Medial compartment was cleaned with the exception of a long  _____ tear through the posterior third of the medial meniscus, which was  unstable. A partial medial meniscectomy was performed with both rigid  and power instrumentation leaving well-contoured and balanced rim. The  ACL, PCL, and lateral compartment was free of pathology. The knee was  then thoroughly irrigated and evacuated of all loose debris and  instilled 20 mL of 0.25% Marcaine plain and the port was repaired with  4-0 nylon in a figure-of-eight fashion. Based on preoperative review of this patient's left knee MRI, the  location of the bone marrow lesion, consistent with an insufficiency or  stress fracture, in the medial tibial plateau was identified. Preoperative surgical planning allowed for determination of the optimal  method for accessing the lesion. Intraoperatively, image fluoroscopy  combined with bone targeting instruments from [address] [place] [address] were  used to guide surgical instruments into the proximity of the subchondral  medial tibial plateau fracture. Specifically, the [employer]  Accuport injection cannula was placed in the subchondral bone. Standard  repair methodology was used to treat the subchondral bone defect in the  medial tibial plateau. Image fluoroscopy was utilized to confirm  accurate insertion of the Accuport injection cannula into the  subchondral bone. After insertion, fracture stabilization was performed  by injecting 3 mL of [employer] Accufill bone substitute into  the medial tibial plateau. Image fluoroscopy was used to monitor the  injection process and insure injection of the bone substitute into the  subchondral bone so that following polymerization, the bone substitute  stabilized the fracture and facilitated fracture repair. The injection  wounds were closed and sterile dressing was applied.         Markos Vivas MD    D: 11/12/2018 14:45:09       T: 11/13/2018 0:19:40

## 2018-11-21 ENCOUNTER — OFFICE VISIT (OUTPATIENT)
Dept: ORTHOPEDIC SURGERY | Age: 53
End: 2018-11-21

## 2018-11-21 VITALS — BODY MASS INDEX: 35.53 KG/M2 | HEIGHT: 73 IN | WEIGHT: 268.08 LBS

## 2018-11-21 DIAGNOSIS — S82.142G CLOSED FRACTURE OF LEFT TIBIAL PLATEAU WITH DELAYED HEALING, SUBSEQUENT ENCOUNTER: ICD-10-CM

## 2018-11-21 DIAGNOSIS — S83.242D ACUTE MEDIAL MENISCUS TEAR OF LEFT KNEE, SUBSEQUENT ENCOUNTER: Primary | ICD-10-CM

## 2018-11-21 DIAGNOSIS — M25.561 ACUTE PAIN OF RIGHT KNEE: ICD-10-CM

## 2018-11-21 PROCEDURE — 99024 POSTOP FOLLOW-UP VISIT: CPT | Performed by: ORTHOPAEDIC SURGERY

## 2018-11-21 NOTE — PATIENT INSTRUCTIONS
exercise if you start to have pain. Your doctor or physical therapist will tell you when you can start these exercises and which ones will work best for you. How to do the exercises  Quad sets    5. Sit with your leg straight and supported on the floor or a firm bed. (If you feel discomfort in the front or back of your knee, place a small towel roll under your knee.)  6. Tighten the muscles on top of your thigh by pressing the back of your knee flat down to the floor. (If you feel discomfort under your kneecap, place a small towel roll under your knee.)  7. Hold for about 6 seconds, then rest for up to 10 seconds. 8. Do 8 to 12 repetitions several times a day. Straight-leg raises to the front    5. Lie on your back with your good knee bent so that your foot rests flat on the floor. Your injured leg should be straight. Make sure that your low back has a normal curve. You should be able to slip your flat hand in between the floor and the small of your back, with your palm touching the floor and your back touching the back of your hand. 6. Tighten the thigh muscles in the injured leg by pressing the back of your knee flat down to the floor. Hold your knee straight. 7. Keeping the thigh muscles tight, lift your injured leg up so that your heel is about 12 inches off the floor. Hold for about 6 seconds and then lower slowly. 8. Do 8 to 12 repetitions, 3 times a day. Straight-leg raises to the outside    6. Lie on your side, with your injured leg on top. 7. Tighten the front thigh muscles of your injured leg to keep your knee straight. 8. Keep your hip and your leg straight in line with the rest of your body, and keep your knee pointing forward. Do not drop your hip back. 9. Lift your injured leg straight up toward the ceiling, about 12 inches off the floor. Hold for about 6 seconds, then slowly lower your leg. 10. Do 8 to 12 repetitions. Straight-leg raises to the back    4.  Lie on your stomach, and standing knee bends    5. Stand with your hands lightly resting on a counter or chair in front of you. Put your feet shoulder-width apart. 6. Slowly bend your knees so that you squat down like you are going to sit in a chair. Make sure your knees do not go in front of your toes. 7. Lower yourself about 6 inches. Your heels should remain on the floor at all times. 8. Rise slowly to a standing position. Heel raises    5. Stand with your feet a few inches apart, with your hands lightly resting on a counter or chair in front of you. 6. Slowly raise your heels off the floor while keeping your knees straight. 7. Hold for about 6 seconds, then slowly lower your heels to the floor. 8. Do 8 to 12 repetitions several times during the day. Follow-up care is a key part of your treatment and safety. Be sure to make and go to all appointments, and call your doctor if you are having problems. It's also a good idea to know your test results and keep a list of the medicines you take. Where can you learn more? Go to https://[email or web address].[email or web address].io. org and sign in to your [employer]odaplanes account. Enter W380 in the [facility] box to learn more about \"Knee: Exercises. \"     If you do not have an account, please click on the \"Sign Up Now\" link. Current as of: November 29, 2017  Content Version: 11.8  © 3837-1109 [employer]. Care instructions adapted under license by [facility] ([facility], [place]). If you have questions about a medical condition or this instruction, always ask your healthcare professional. [place] [address] any warranty or liability for your use of this information.

## 2018-11-27 ENCOUNTER — TELEPHONE (OUTPATIENT)
Dept: ORTHOPEDIC SURGERY | Age: 53
End: 2018-11-27

## 2018-11-28 ENCOUNTER — OFFICE VISIT (OUTPATIENT)
Dept: ORTHOPEDIC SURGERY | Age: 53
End: 2018-11-28
Payer: OTHER GOVERNMENT

## 2018-11-28 VITALS — BODY MASS INDEX: 35.53 KG/M2 | HEIGHT: 73 IN | WEIGHT: 268.08 LBS

## 2018-11-28 DIAGNOSIS — S82.142G: Primary | ICD-10-CM

## 2018-11-28 DIAGNOSIS — G56.01 CARPAL TUNNEL SYNDROME ON RIGHT: Primary | ICD-10-CM

## 2018-11-28 PROCEDURE — 99213 OFFICE O/P EST LOW 20 MIN: CPT | Performed by: ORTHOPAEDIC SURGERY

## 2018-11-28 RX ORDER — HYDROCODONE BITARTRATE AND ACETAMINOPHEN 5; 325 MG/1; MG/1
1 TABLET ORAL EVERY 6 HOURS PRN
Qty: 28 TABLET | Refills: 0 | Status: SHIPPED | OUTPATIENT
Start: 2018-11-28 | End: 2018-12-05

## 2018-11-28 NOTE — PROGRESS NOTES
CARPAL TUNNEL VISIT        HISTORY OF PRESENT ILLNESS    Lanny Hackett is a 46 y.o. male who presents for evaluation of his right hand. He carpal tunnel release surgery on the right about a year ago. He still some soreness particularly with overuse and with weather change. Basically is doing fine    ROS    Constitutional:  Denies fever or chills   Eyes:  Denies change in visual acuity   HENT:  Denies nasal congestion or sore throat   Respiratory:  Denies cough or shortness of breath   Cardiovascular:  Denies chest pain or edema   GI:  Denies abdominal pain, nausea, vomiting, bloody stools or diarrhea   :  Denies dysuria   Musculoskeletal:  He has some issues with his knees  Integument:  Denies rash   Neurologic:  Denies headache, focal weakness or sensory changes   Endocrine:  Denies polyuria or polydipsia   Lymphatic:  Denies swollen glands   Psychiatric:  Denies depression or anxiety   All other ROS negative except for above.     Past Surgical history    Past Surgical History:   Procedure Laterality Date    CARPAL TUNNEL RELEASE      COLONOSCOPY  11/04/2016    normal    CYST REMOVAL      HAND SURGERY      HERNIA REPAIR      KNEE ARTHROSCOPY      KNEE ARTHROSCOPY Right 12/01/2014    KNEE ARTHROSCOPY Left 11/12/2018    KNEE VIDEO ARTHROSCOPY, MEDIAL MENISECTOMY, CHONDROPLASTY, INTERNAL FIXATION MEDIAL TIBIAL PLATEAU INSUFFICIENCY FRACTURE WITH BONE SUBSTITUTE     KNEE SURGERY      [place] KNEE SCOPE, ALLOGRAFT IMPANT Left 11/12/2018    KNEE VIDEO ARTHROSCOPY, MEDIAL MENISECTOMY, CHONDROPLASTY, INTERNAL FIXATION MEDIAL TIBIAL PLATEAU INSUFFICIENCY FRACTURE WITH BONE SUBSTITUTE performed by Lanie Richey MD at [address] [place] [address]    Past Medical History:   Diagnosis Date    Carpal tunnel syndrome 10/24/2013    Herniated disc     Hypertension     No history of procedure 11/04/2016    Primary localized osteoarthrosis, lower leg 10/17/2013       Allergies    Allergies   Allergen Reactions    - Right  Surface Exam - no cutaneous lesions are seen      Right Hand Exam:      Neurologic Exam:    Reflexes:  Normal  Phalens:  Negative   Tinels:  Negative  Median Nerve Compression:  Negative  Thenar strength:  Normal  Thenar atrophy:  None noted  2 PD:  Normal  Elbow Flexion Test:  Negative  Is negative. Finkelstein's test, but has some pain in the base of thumb pressure  Wrist Motion Right Left   DF     PF     RD     CMC     UD     SUP     PRO       NCV / EMG STUDIES    None    IMAGING STUDIES    None    IMPRESSION    Right hand pain status post carpal tunnel release    PLAN    1. Conservative care options including physical therapy, NSAIDs, bracing, and activity modification were discussed. 2.  The indications for therapeutic injections were discussed. 3.  The indications for additional imaging studies were discussed.    4.  After considering the various options discussed, the patient elected to pursue a course that includes Periodic evaluation

## 2018-11-29 ENCOUNTER — OFFICE VISIT (OUTPATIENT)
Dept: ORTHOPEDIC SURGERY | Age: 53
End: 2018-11-29
Payer: OTHER GOVERNMENT

## 2018-11-29 VITALS — WEIGHT: 268.08 LBS | HEIGHT: 73 IN | BODY MASS INDEX: 35.53 KG/M2

## 2018-11-29 DIAGNOSIS — S83.211D BUCKET-HANDLE TEAR OF MEDIAL MENISCUS OF RIGHT KNEE AS CURRENT INJURY, SUBSEQUENT ENCOUNTER: Chronic | ICD-10-CM

## 2018-11-29 DIAGNOSIS — M17.11 PRIMARY OSTEOARTHRITIS OF RIGHT KNEE: Primary | ICD-10-CM

## 2018-11-29 PROCEDURE — 99213 OFFICE O/P EST LOW 20 MIN: CPT | Performed by: ORTHOPAEDIC SURGERY

## 2018-12-19 ENCOUNTER — OFFICE VISIT (OUTPATIENT)
Dept: ORTHOPEDIC SURGERY | Age: 53
End: 2018-12-19

## 2018-12-19 VITALS — WEIGHT: 268.08 LBS | BODY MASS INDEX: 35.53 KG/M2 | HEIGHT: 73 IN

## 2018-12-19 DIAGNOSIS — S83.242D ACUTE MEDIAL MENISCUS TEAR OF LEFT KNEE, SUBSEQUENT ENCOUNTER: Primary | ICD-10-CM

## 2018-12-19 DIAGNOSIS — S82.132D CLOSED FRACTURE OF MEDIAL PORTION OF LEFT TIBIAL PLATEAU WITH ROUTINE HEALING, SUBSEQUENT ENCOUNTER: ICD-10-CM

## 2018-12-19 PROBLEM — S82.132A CLOSED FRACTURE OF MEDIAL PLATEAU OF LEFT TIBIA: Status: ACTIVE | Noted: 2018-12-19

## 2018-12-19 PROCEDURE — 99024 POSTOP FOLLOW-UP VISIT: CPT | Performed by: ORTHOPAEDIC SURGERY

## 2019-01-16 RX ORDER — NABUMETONE 500 MG/1
TABLET, FILM COATED ORAL
Qty: 60 TABLET | Refills: 1 | Status: SHIPPED | OUTPATIENT
Start: 2019-01-16 | End: 2021-02-01

## 2019-03-11 ENCOUNTER — OFFICE VISIT (OUTPATIENT)
Dept: FAMILY MEDICINE CLINIC | Age: 54
End: 2019-03-11
Payer: COMMERCIAL

## 2019-03-11 ENCOUNTER — TELEPHONE (OUTPATIENT)
Dept: FAMILY MEDICINE CLINIC | Age: 54
End: 2019-03-11

## 2019-03-11 VITALS
WEIGHT: 281.6 LBS | HEIGHT: 72 IN | DIASTOLIC BLOOD PRESSURE: 92 MMHG | TEMPERATURE: 98 F | SYSTOLIC BLOOD PRESSURE: 140 MMHG | HEART RATE: 92 BPM | BODY MASS INDEX: 38.14 KG/M2 | OXYGEN SATURATION: 96 %

## 2019-03-11 DIAGNOSIS — R05.9 COUGH: ICD-10-CM

## 2019-03-11 DIAGNOSIS — J22 LOWER RESPIRATORY TRACT INFECTION: Primary | ICD-10-CM

## 2019-03-11 PROCEDURE — 99213 OFFICE O/P EST LOW 20 MIN: CPT | Performed by: FAMILY MEDICINE

## 2019-03-11 RX ORDER — DOXYCYCLINE HYCLATE 100 MG
100 TABLET ORAL 2 TIMES DAILY
Qty: 20 TABLET | Refills: 0 | Status: SHIPPED | OUTPATIENT
Start: 2019-03-11 | End: 2019-03-21

## 2019-03-11 ASSESSMENT — PATIENT HEALTH QUESTIONNAIRE - PHQ9
2. FEELING DOWN, DEPRESSED OR HOPELESS: 0
SUM OF ALL RESPONSES TO PHQ9 QUESTIONS 1 & 2: 0
SUM OF ALL RESPONSES TO PHQ QUESTIONS 1-9: 0
1. LITTLE INTEREST OR PLEASURE IN DOING THINGS: 0
SUM OF ALL RESPONSES TO PHQ QUESTIONS 1-9: 0

## 2019-03-15 ENCOUNTER — TELEPHONE (OUTPATIENT)
Dept: FAMILY MEDICINE CLINIC | Age: 54
End: 2019-03-15

## 2019-03-22 ENCOUNTER — TELEPHONE (OUTPATIENT)
Dept: FAMILY MEDICINE CLINIC | Age: 54
End: 2019-03-22

## 2019-04-01 ENCOUNTER — NURSE ONLY (OUTPATIENT)
Dept: FAMILY MEDICINE CLINIC | Age: 54
End: 2019-04-01
Payer: COMMERCIAL

## 2019-04-01 DIAGNOSIS — R73.9 HYPERGLYCEMIA: Primary | ICD-10-CM

## 2019-04-01 DIAGNOSIS — R73.9 HYPERGLYCEMIA: ICD-10-CM

## 2019-04-01 PROCEDURE — 36415 COLL VENOUS BLD VENIPUNCTURE: CPT | Performed by: FAMILY MEDICINE

## 2019-04-02 LAB
ESTIMATED AVERAGE GLUCOSE: 148.5 MG/DL
HBA1C MFR BLD: 6.8 %

## 2019-04-03 DIAGNOSIS — E13.9 OTHER SPECIFIED DIABETES MELLITUS WITHOUT COMPLICATION, WITHOUT LONG-TERM CURRENT USE OF INSULIN (HCC): ICD-10-CM

## 2019-04-03 DIAGNOSIS — E11.9 CONTROLLED TYPE 2 DIABETES MELLITUS WITHOUT COMPLICATION, WITHOUT LONG-TERM CURRENT USE OF INSULIN (HCC): ICD-10-CM

## 2019-04-03 DIAGNOSIS — R73.03 PREDIABETES: Primary | ICD-10-CM

## 2019-04-05 ENCOUNTER — OFFICE VISIT (OUTPATIENT)
Dept: FAMILY MEDICINE CLINIC | Age: 54
End: 2019-04-05
Payer: COMMERCIAL

## 2019-04-05 VITALS
SYSTOLIC BLOOD PRESSURE: 108 MMHG | RESPIRATION RATE: 16 BRPM | BODY MASS INDEX: 37.57 KG/M2 | HEART RATE: 93 BPM | HEIGHT: 72 IN | WEIGHT: 277.4 LBS | OXYGEN SATURATION: 95 % | DIASTOLIC BLOOD PRESSURE: 80 MMHG

## 2019-04-05 DIAGNOSIS — S83.242S ACUTE MEDIAL MENISCUS TEAR OF LEFT KNEE, SEQUELA: ICD-10-CM

## 2019-04-05 DIAGNOSIS — E78.2 MIXED HYPERLIPIDEMIA: ICD-10-CM

## 2019-04-05 DIAGNOSIS — I10 ESSENTIAL HYPERTENSION: ICD-10-CM

## 2019-04-05 DIAGNOSIS — E11.9 CONTROLLED TYPE 2 DIABETES MELLITUS WITHOUT COMPLICATION, WITHOUT LONG-TERM CURRENT USE OF INSULIN (HCC): Primary | ICD-10-CM

## 2019-04-05 DIAGNOSIS — Z91.89 CARDIOVASCULAR RISK FACTOR: ICD-10-CM

## 2019-04-05 DIAGNOSIS — Z82.49 FAMILY HISTORY OF ASCVD (ARTERIOSCLEROTIC CARDIOVASCULAR DISEASE): ICD-10-CM

## 2019-04-05 DIAGNOSIS — E78.00 PURE HYPERCHOLESTEROLEMIA: ICD-10-CM

## 2019-04-05 PROCEDURE — 99214 OFFICE O/P EST MOD 30 MIN: CPT | Performed by: FAMILY MEDICINE

## 2019-04-05 RX ORDER — ROSUVASTATIN CALCIUM 5 MG/1
5 TABLET, COATED ORAL DAILY
Qty: 30 TABLET | Refills: 11 | Status: SHIPPED | OUTPATIENT
Start: 2019-04-05 | End: 2020-01-27 | Stop reason: SINTOL

## 2019-04-05 NOTE — PROGRESS NOTES
Chief Complaint   Patient presents with    Diabetes     Pt states he is not taking any medication for diabetes and has no new concerns    Hypertension     Pt states he does not take any bp medication and does not follow a low sodium diet. Pt denies having any chest pains or headaches    Hyperlipidemia     pt does not take any medication for this and does not follow a diet plan.  Other   His knee has been hurting due to the weather. He plans on increasing his exercise. Discussed his HgbA1c is in the diabetic range now. Discussed that his cardiac risk score has also increased due to the diabetes. His father  of a heart attack. Discussed weight loss would help both issues. Father  at age 61. HPI:  Harrison Varela is a 53 y.o. (: 1965) here today for    Is here for high blood pressure. Watching salt intake. Numbers are good. Denies chest pain. Denies shortness of breath. Taking medications as prescribed. Is here for diabetes. Numbers are good. No increased thirst or increased urination. No dizziness, shakiness, or cold sweats. Is here for cholesterol. Tolerating medication. Trying to watch low-fat diet. Weight stable. No change in exercise. Here for hypothyroidism. No tremor. No palpitations. No hair loss. No change in skin texture. Patient's medications, allergies, past medical, surgical, social and family histories were reviewed and updated asappropriate on 2019 at 2:23 PM.    ROS:  Review of Systems    All other systems reviewed and are negative except as noted above on 2019 at 2:23 PM. Additional review of systems may be scanned into the media section ofthis medical record. Any responses requiring further intervention were pursued.     Hemoglobin A1C (%)   Date Value   2019 6.8     Microscopic Examination (no units)   Date Value   2015 Not Indicated     LDL Calculated (mg/dL)   Date Value   10/01/2018 136 (H)     Past Medical History: Diagnosis Date    Carpal tunnel syndrome 10/24/2013    Herniated disc     Hypertension     No history of procedure 2016    Primary localized osteoarthrosis, lower leg 10/17/2013        Family History   Problem Relation Age of Onset    Early Death Father     Heart Disease Father     Cancer Maternal Aunt     Cancer Maternal Grandmother      Social History     Socioeconomic History    Marital status: Single     Spouse name: Not on file    Number of children: Not on file    Years of education: Not on file    Highest education level: Not on file   Occupational History    Not on file   Social Needs    Financial resource strain: Not on file    Food insecurity:     Worry: Not on file     Inability: Not on file    Transportation needs:     Medical: Not on file     Non-medical: Not on file   Tobacco Use    Smoking status: Former Smoker     Packs/day: 1.00     Years: 6.00     Pack years: 6.00     Last attempt to quit: 1999     Years since quittin.1    Smokeless tobacco: Never Used    Tobacco comment: Patient does not know how many packs per day, he has not smoked in ovver 20 years   Substance and Sexual Activity    Alcohol use: Yes     Comment: rarely    Drug use: No    Sexual activity: Yes     Partners: Female   Lifestyle    Physical activity:     Days per week: Not on file     Minutes per session: Not on file    Stress: Not on file   Relationships    Social connections:     Talks on phone: Not on file     Gets together: Not on file     Attends Islam service: Not on file     Active member of club or organization: Not on file     Attends meetings of clubs or organizations: Not on file     Relationship status: Not on file    Intimate partner violence:     Fear of current or ex partner: Not on file     Emotionally abused: Not on file     Physically abused: Not on file     Forced sexual activity: Not on file   Other Topics Concern    Not on file   Social History Narrative    ** Merged History Encounter **            Prior to Visit Medications    Medication Sig Taking? Authorizing Provider   nabumetone (RELAFEN) 500 MG tablet TAKE ONE TABLET BY MOUTH TWICE A DAY Yes Alysa Menezes MD   meclizine (ANTIVERT) 25 MG tablet TAKE ONE TABLET BY MOUTH ONE HOUR BEFORE TRAVEL, REPEAT EVERY 12 TO 24 HOURS IF NEEDED Yes Moxee Mac, APRN - CNP   promethazine (PHENERGAN) 25 MG tablet TAKE ONE TABLET BY MOUTH EVERY 6 HOURS AS NEEDED FOR VERTIGO OR NAUSEA Yes Dakota Mac, APRN - CNP   folic acid (FOLVITE) 1 MG tablet Take 1 mg by mouth daily Yes Historical Provider, MD   CLOMIPHENE CITRATE PO Take by mouth From Dr. Shahzad Mcgregor Yes Historical Provider, MD   Omega-3 Fatty Acids (FISH OIL PO) Take 3 tablets by mouth daily Yes Historical Provider, MD   methocarbamol (ROBAXIN-750) 750 MG tablet Take 1 tablet by mouth 3 times daily Yes MK Killian MD   tadalafil (CIALIS) 5 MG tablet Take 5 mg by mouth as needed for Erectile Dysfunction. Yes Historical Provider, MD   traMADol (ULTRAM) 50 MG tablet Take 50 mg by mouth every 6 hours as needed. Yes Historical Provider, MD   aspirin 81 MG tablet Take 81 mg by mouth daily. Yes Historical Provider, MD     Allergies   Allergen Reactions    Dye [Iodides]     Iodine Swelling    Penicillins Rash       OBJECTIVE:  Estimated body mass index is 37.61 kg/m² as calculated from the following:    Height as of this encounter: 6' 0.01\" (1.829 m). Weight as of this encounter: 277 lb 6.4 oz (125.8 kg).   Vitals:    04/05/19 1401   BP: 108/80   Site: Left Upper Arm   Position: Sitting   Cuff Size: Medium Adult   Pulse: 93   Resp: 16   SpO2: 95%   Weight: 277 lb 6.4 oz (125.8 kg)   Height: 6' 0.01\" (1.829 m)     BP Readings from Last 2 Encounters:   04/05/19 108/80   03/11/19 (!) 140/92     Wt Readings from Last 3 Encounters:   04/05/19 277 lb 6.4 oz (125.8 kg)   03/11/19 281 lb 9.6 oz (127.7 kg)   12/19/18 268 lb 1.3 oz (121.6 kg)   The 10-year ASCVD risk score (Taina Green et al., 2013) is: 10.6%    Values used to calculate the score:      Age: 53 years      Sex: Male      Is Non- : No      Diabetic: Yes      Tobacco smoker: No      Systolic Blood Pressure: 779 mmHg      Is BP treated: No      HDL Cholesterol: 35 mg/dL      Total Cholesterol: 197 mg/dL  Physical Exam   Constitutional: He appears well-developed and well-nourished. No distress. HENT:   Head: Normocephalic and atraumatic. Right Ear: External ear normal.   Left Ear: External ear normal.   Nose: Nose normal.   Lips symmetric   Eyes: Pupils are equal, round, and reactive to light. Lids are normal. Right eye exhibits no discharge. Left eye exhibits no discharge. No scleral icterus. Neck: No JVD present. No thyromegaly present. Cardiovascular: Normal rate, regular rhythm and normal heart sounds. Pulmonary/Chest: Effort normal and breath sounds normal. No respiratory distress. Abdominal: Soft. There is no hepatosplenomegaly. There is no tenderness. Musculoskeletal: He exhibits no edema. Left knee: He exhibits swelling. Tenderness found. Legs:  Skin: Skin is warm and dry. No rash noted. He is not diaphoretic. No erythema. No pallor. Turgor normal   Psychiatric: He has a normal mood and affect. His behavior is normal. Judgment and thought content normal.   Nursing note and vitals reviewed. ASSESSMENT PLAN      Diagnosis Orders   1. Controlled type 2 diabetes mellitus without complication, without long-term current use of insulin (HCC)  metFORMIN (GLUCOPHAGE) 500 MG tablet    Hemoglobin A1C   2. Pure hypercholesterolemia  rosuvastatin (CRESTOR) 5 MG tablet    LIPID PANEL    Hepatic Function Panel   3. Acute medial meniscus tear of left knee, sequela     4. Essential hypertension     5. Family history of ASCVD (arteriosclerotic cardiovascular disease)     6. Mixed hyperlipidemia  CK   7. Cardiovascular risk factor     Of an A1c now 6.6.

## 2019-04-22 ENCOUNTER — OFFICE VISIT (OUTPATIENT)
Dept: ORTHOPEDIC SURGERY | Age: 54
End: 2019-04-22
Payer: OTHER GOVERNMENT

## 2019-04-22 VITALS
HEART RATE: 82 BPM | DIASTOLIC BLOOD PRESSURE: 79 MMHG | WEIGHT: 277.34 LBS | HEIGHT: 72 IN | BODY MASS INDEX: 37.56 KG/M2 | SYSTOLIC BLOOD PRESSURE: 133 MMHG

## 2019-04-22 DIAGNOSIS — M17.11 PRIMARY OSTEOARTHRITIS OF RIGHT KNEE: Primary | ICD-10-CM

## 2019-04-22 PROCEDURE — 99213 OFFICE O/P EST LOW 20 MIN: CPT | Performed by: ORTHOPAEDIC SURGERY

## 2019-04-22 NOTE — PROGRESS NOTES
KNEE VISIT      HISTORY OF PRESENT ILLNESS    Remington Yeung is a 53 y.o. male who presents for reevaluation of his right knee. .  .  He still has aches and pains and occasional discomfort but he feels it may be causes of the other leg acting up a little bit. ROS    Constitutional:  Denies fever or chills   Eyes:  Denies change in visual acuity   HENT:  Denies nasal congestion or sore throat   Respiratory:  Denies cough or shortness of breath   Cardiovascular:  Denies chest pain or edema   GI:  Denies abdominal pain, nausea, vomiting, bloody stools or diarrhea   :  Denies dysuria   Musculoskeletal: He has continued right hand pain  Integument:  Denies rash   Neurologic:  He has persistent right hand pain status post carpal tunnel release. Endocrine:  Denies polyuria or polydipsia   Lymphatic:  Denies swollen glands   Psychiatric:  Denies depression or anxiety   All other ROS negative except for above.     Past Surgical history    Past Surgical History:   Procedure Laterality Date    CARPAL TUNNEL RELEASE      COLONOSCOPY  11/04/2016    normal    CYST REMOVAL      HAND SURGERY      HERNIA REPAIR      KNEE ARTHROSCOPY      KNEE ARTHROSCOPY Right 12/01/2014    KNEE ARTHROSCOPY Left 11/12/2018    KNEE VIDEO ARTHROSCOPY, MEDIAL MENISECTOMY, CHONDROPLASTY, INTERNAL FIXATION MEDIAL TIBIAL PLATEAU INSUFFICIENCY FRACTURE WITH BONE SUBSTITUTE     KNEE SURGERY      [place] KNEE SCOPE, ALLOGRAFT IMPANT Left 11/12/2018    KNEE VIDEO ARTHROSCOPY, MEDIAL MENISECTOMY, CHONDROPLASTY, INTERNAL FIXATION MEDIAL TIBIAL PLATEAU INSUFFICIENCY FRACTURE WITH BONE SUBSTITUTE performed by Manisha Luevano MD at [address] [place] [address]    Past Medical History:   Diagnosis Date    Carpal tunnel syndrome 10/24/2013    Herniated disc     Hypertension     No history of procedure 11/04/2016    Primary localized osteoarthrosis, lower leg 10/17/2013       Allergies    Allergies   Allergen Reactions    Dye [Iodides]     Iodine Swelling    Penicillins Rash       Meds    Current Outpatient Medications   Medication Sig Dispense Refill    metFORMIN (GLUCOPHAGE) 500 MG tablet Take 1 tablet by mouth 2 times daily (with meals) 60 tablet 11    rosuvastatin (CRESTOR) 5 MG tablet Take 1 tablet by mouth daily 30 tablet 11    nabumetone (RELAFEN) 500 MG tablet TAKE ONE TABLET BY MOUTH TWICE A DAY 60 tablet 1    meclizine (ANTIVERT) 25 MG tablet TAKE ONE TABLET BY MOUTH ONE HOUR BEFORE TRAVEL, REPEAT EVERY 12 TO 24 HOURS IF NEEDED 30 tablet 0    promethazine (PHENERGAN) 25 MG tablet TAKE ONE TABLET BY MOUTH EVERY 6 HOURS AS NEEDED FOR VERTIGO OR NAUSEA 20 tablet 0    folic acid (FOLVITE) 1 MG tablet Take 1 mg by mouth daily      CLOMIPHENE CITRATE PO Take by mouth From Dr. Chi Davis (FISH OIL PO) Take 3 tablets by mouth daily      methocarbamol (ROBAXIN-750) 750 MG tablet Take 1 tablet by mouth 3 times daily 90 tablet 3    tadalafil (CIALIS) 5 MG tablet Take 5 mg by mouth as needed for Erectile Dysfunction.  traMADol (ULTRAM) 50 MG tablet Take 50 mg by mouth every 6 hours as needed.  aspirin 81 MG tablet Take 81 mg by mouth daily. No current facility-administered medications for this visit.         Social    Social History     Socioeconomic History    Marital status: Single     Spouse name: Not on file    Number of children: Not on file    Years of education: Not on file    Highest education level: Not on file   Occupational History    Not on file   Social Needs    Financial resource strain: Not on file    Food insecurity:     Worry: Not on file     Inability: Not on file    Transportation needs:     Medical: Not on file     Non-medical: Not on file   Tobacco Use    Smoking status: Former Smoker     Packs/day: 1.00     Years: 6.00     Pack years: 6.00     Last attempt to quit: 1999     Years since quittin.2    Smokeless tobacco: Never Used    Tobacco comment: Patient does not know how many packs per day, he has not smoked in ovver 20 years   Substance and Sexual Activity    Alcohol use: Yes     Comment: rarely    Drug use: No    Sexual activity: Yes     Partners: Female   Lifestyle    Physical activity:     Days per week: Not on file     Minutes per session: Not on file    Stress: Not on file   Relationships    Social connections:     Talks on phone: Not on file     Gets together: Not on file     Attends Rastafari service: Not on file     Active member of club or organization: Not on file     Attends meetings of clubs or organizations: Not on file     Relationship status: Not on file    Intimate partner violence:     Fear of current or ex partner: Not on file     Emotionally abused: Not on file     Physically abused: Not on file     Forced sexual activity: Not on file   Other Topics Concern    Not on file   Social History Narrative    ** Merged History Encounter **            Family HISTORY    Family History   Problem Relation Age of Onset    Early Death Father     Heart Disease Father     Cancer Maternal Aunt     Cancer Maternal Grandmother        PHYSICAL EXAM    Vital Signs:  /79   Pulse 82   Ht 6' 0.01\" (1.829 m)   Wt 277 lb 5.4 oz (125.8 kg)   BMI 37.61 kg/m²   General Appearance:  Normal body habitus. Alert and oriented to person, place, and time. Affect:  Normal.   Gait:  Normal. Good balance and coordination. Skin:  Intact. Sensation:  Intact. Strength:  Intact. Reflexes:  Intact. Pulses:  Intact.    Knee Exam:    Effusion:  Trace    Range of Motion Right Left   Extension 0    Flexion 100      Provocative Test Right Left    Positive Negative Positive Negative   Anterior drawer [] [x] [] []   Lachman [] [x] [] []   Posterior drawer [] [x] [] []   Varus testing [] [x] [] []   Valgus testing [] [x] [] []   Joint line tenderness [x] [x] [] []     Additional Exam Comments:    His neurocirculatory lymphatic exam is normal symmetric to both lower extremities. He has generalized soreness but negative Tessy's. .  I feel he is improving. IMAGING STUDIES      X-rays were not done today    IMPRESSION    Right knee pain secondary to osteoarthritis, mostly in the medial and some in the patellofemoral compartment     PLAN      1. Conservative care options including physical therapy, NSAIDs, bracing, and activity modification were discussed. 2.  The indications for therapeutic injections were discussed. 3.  The indications for additional imaging studies were discussed.    4.  After considering the various options discussed, the patient elected to pursue a course that includes  Observation and periodic evaluation

## 2019-04-25 ENCOUNTER — OFFICE VISIT (OUTPATIENT)
Dept: ORTHOPEDIC SURGERY | Age: 54
End: 2019-04-25
Payer: OTHER GOVERNMENT

## 2019-04-25 VITALS — BODY MASS INDEX: 37.56 KG/M2 | RESPIRATION RATE: 10 BRPM | HEIGHT: 72 IN | WEIGHT: 277.34 LBS

## 2019-04-25 DIAGNOSIS — G56.01 CARPAL TUNNEL SYNDROME ON RIGHT: Primary | ICD-10-CM

## 2019-04-25 PROCEDURE — 99213 OFFICE O/P EST LOW 20 MIN: CPT | Performed by: ORTHOPAEDIC SURGERY

## 2019-04-25 NOTE — PROGRESS NOTES
Swelling    Penicillins Rash       Meds    Current Outpatient Medications   Medication Sig Dispense Refill    metFORMIN (GLUCOPHAGE) 500 MG tablet Take 1 tablet by mouth 2 times daily (with meals) 60 tablet 11    rosuvastatin (CRESTOR) 5 MG tablet Take 1 tablet by mouth daily 30 tablet 11    nabumetone (RELAFEN) 500 MG tablet TAKE ONE TABLET BY MOUTH TWICE A DAY 60 tablet 1    meclizine (ANTIVERT) 25 MG tablet TAKE ONE TABLET BY MOUTH ONE HOUR BEFORE TRAVEL, REPEAT EVERY 12 TO 24 HOURS IF NEEDED 30 tablet 0    promethazine (PHENERGAN) 25 MG tablet TAKE ONE TABLET BY MOUTH EVERY 6 HOURS AS NEEDED FOR VERTIGO OR NAUSEA 20 tablet 0    folic acid (FOLVITE) 1 MG tablet Take 1 mg by mouth daily      CLOMIPHENE CITRATE PO Take by mouth From Dr. Luis Glaser (FISH OIL PO) Take 3 tablets by mouth daily      methocarbamol (ROBAXIN-750) 750 MG tablet Take 1 tablet by mouth 3 times daily 90 tablet 3    tadalafil (CIALIS) 5 MG tablet Take 5 mg by mouth as needed for Erectile Dysfunction.  traMADol (ULTRAM) 50 MG tablet Take 50 mg by mouth every 6 hours as needed.  aspirin 81 MG tablet Take 81 mg by mouth daily. No current facility-administered medications for this visit.         Social    Social History     Socioeconomic History    Marital status: Single     Spouse name: Not on file    Number of children: Not on file    Years of education: Not on file    Highest education level: Not on file   Occupational History    Not on file   Social Needs    Financial resource strain: Not on file    Food insecurity:     Worry: Not on file     Inability: Not on file    Transportation needs:     Medical: Not on file     Non-medical: Not on file   Tobacco Use    Smoking status: Former Smoker     Packs/day: 1.00     Years: 6.00     Pack years: 6.00     Last attempt to quit: 1999     Years since quittin.2    Smokeless tobacco: Never Used    Tobacco comment: Patient does not know how many packs per day, he has not smoked in ovver 20 years   Substance and Sexual Activity    Alcohol use: Yes     Comment: rarely    Drug use: No    Sexual activity: Yes     Partners: Female   Lifestyle    Physical activity:     Days per week: Not on file     Minutes per session: Not on file    Stress: Not on file   Relationships    Social connections:     Talks on phone: Not on file     Gets together: Not on file     Attends [employer] service: Not on file     Active member of club or organization: Not on file     Attends meetings of clubs or organizations: Not on file     Relationship status: Not on file    Intimate partner violence:     Fear of current or ex partner: Not on file     Emotionally abused: Not on file     Physically abused: Not on file     Forced sexual activity: Not on file   Other Topics Concern    Not on file   Social History Narrative    ** Merged History Encounter **            Family HISTORY    Family History   Problem Relation Age of Onset    Early Death Father     Heart Disease Father     Cancer Maternal Aunt     Cancer Maternal Grandmother        PHYSICAL EXAM    Vital Signs:  Resp 10   Ht 6' 0.01\" (1.829 m)   Wt 277 lb 5.4 oz (125.8 kg)   BMI 37.61 kg/m²   General Appearance:  Normal body habitus. Alert and oriented to person, place, and time. Affect:  Normal.   Gait:  Antalgic due to recent left knee surgery. Reflexes:  Intact. Pulses:  Normal.   Skin:  Normal.     Wrist Exam  Hand dominance - Right  Surface Exam - no cutaneous lesions are seen      Right Hand Exam:      Neurologic Exam:    Reflexes:  Normal  Phalens:  Negative   Tinels:  Negative  Median Nerve Compression:  Negative  Thenar strength:  Normal  Thenar atrophy:  None noted  2 PD:  Normal  Elbow Flexion Test:  Negative  Is negative.   Finkelstein's test, but has some pain in the base of thumb pressure  Wrist Motion Right Left   DF     PF     RD     CMC     UD     SUP     PRO       NCV / EMG STUDIES None    IMAGING STUDIES    None    IMPRESSION    Right hand pain status post carpal tunnel release    PLAN    1. Conservative care options including physical therapy, NSAIDs, bracing, and activity modification were discussed. 2.  The indications for therapeutic injections were discussed. 3.  The indications for additional imaging studies were discussed.    4.  After considering the various options discussed, the patient elected to pursue a course that includes Periodic evaluation

## 2019-04-29 ENCOUNTER — OFFICE VISIT (OUTPATIENT)
Dept: ORTHOPEDIC SURGERY | Age: 54
End: 2019-04-29
Payer: OTHER GOVERNMENT

## 2019-04-29 VITALS
DIASTOLIC BLOOD PRESSURE: 83 MMHG | WEIGHT: 277.34 LBS | HEART RATE: 93 BPM | BODY MASS INDEX: 37.56 KG/M2 | SYSTOLIC BLOOD PRESSURE: 142 MMHG | HEIGHT: 72 IN

## 2019-04-29 DIAGNOSIS — S16.1XXD CERVICAL STRAIN, SUBSEQUENT ENCOUNTER: Primary | ICD-10-CM

## 2019-04-29 PROCEDURE — 99214 OFFICE O/P EST MOD 30 MIN: CPT | Performed by: PHYSICIAN ASSISTANT

## 2019-04-29 NOTE — PROGRESS NOTES
lb 5.4 oz (125.8 kg). GENERAL EXAM:  · General Apparence: Patient is adequately groomed with no evidence of malnutrition. · Orientation: The patient is oriented to time, place and person. · Mood & Affect:The patient's mood and affect are appropriate   · Lymphatic: The lymphatic examination bilaterally reveals all areas to be without enlargement or induration  · Sensation: Sensation is intact without deficit  · Coordination/Balance: Good coordination     CERVICAL EXAMINATION:  · Inspection: Local inspection shows no step-off or bruising. · Palpation: No evidence of tenderness at the midline. Paraspinal tenderness is present, right trap with trigger point. · Range of Motion: Intact flexion, mild to moderate loss of extension, moderate loss of right lateral rotation  · Strength: 5/5 bilateral upper extremities   · Special Tests:    ·   Spurling's, L'Hermitte's & Haque's negative bilaterally. .      · Skin:There are no rashes, ulcerations or lesions in right & left upper extremities. · Reflexes: Bilaterally triceps, biceps and brachioradialis are trace. Clonus absent bilaterally at the feet. · Gait & station: Normal gait    · Additional Examinations:  ·  RIGHT UPPER EXTREMITY:  Inspection/examination of the right upper extremity does not show any tenderness, deformity or injury. Range of motion is full. There is no gross instability. There are no rashes, ulcerations or lesions. Strength and tone are normal.  · LEFT UPPER EXTREMITY: Inspection/examination of the left upper extremity does not show any tenderness, deformity or injury. Range of motion is full. There is no gross instability. There are no rashes, ulcerations or lesions. Strength and tone are normal.    Diagnostic Testing:   No new testing         Impression:  #1 Cervical strain--subacute/chronic  #2 H/o work injury 2008      Plan:  #1 PT for above with myofascial release and dry needling   #2 Con't modification work duties.  He is doing well with long term restrictions.   #2 F/u q6mo        Meli Galindo Vasile [facility]

## 2019-05-16 ENCOUNTER — OFFICE VISIT (OUTPATIENT)
Dept: ORTHOPEDIC SURGERY | Age: 54
End: 2019-05-16
Payer: COMMERCIAL

## 2019-05-16 DIAGNOSIS — S82.132D CLOSED FRACTURE OF MEDIAL PORTION OF LEFT TIBIAL PLATEAU WITH ROUTINE HEALING, SUBSEQUENT ENCOUNTER: ICD-10-CM

## 2019-05-16 DIAGNOSIS — S83.242D ACUTE MEDIAL MENISCUS TEAR OF LEFT KNEE, SUBSEQUENT ENCOUNTER: Primary | ICD-10-CM

## 2019-05-16 PROCEDURE — 99212 OFFICE O/P EST SF 10 MIN: CPT | Performed by: ORTHOPAEDIC SURGERY

## 2019-05-16 NOTE — PROGRESS NOTES
FOLLOW-UP VISIT      The patient returns for follow-up s/p left knee video arthroscopy with partial medial meniscectomy and internal fixation of a medial tibial plateau fracture with bone substitute    Date of Surgery    11/12/18    Wound Status    Incisions are healed well with no surrounding erythema, but he still has some soreness on the drill site for the subchondroplasty. .     Exam    He has no signs of infection or DVT. He is walking without ambulatory aids. He has good range of motion today. He has no effusion. His muscle tone is improving. At this time he still says it throbs and aches with foraminal.  We'll make sure he can take an anti-inflammatory medication as well as a supplement sheet.   He should continue with the home rehab program.    Plan    Continue his home directed physical therapy program     Follow-up Appointment    3 months

## 2019-05-30 ENCOUNTER — TELEPHONE (OUTPATIENT)
Dept: ORTHOPEDIC SURGERY | Age: 54
End: 2019-05-30

## 2019-07-01 ENCOUNTER — NURSE ONLY (OUTPATIENT)
Dept: FAMILY MEDICINE CLINIC | Age: 54
End: 2019-07-01
Payer: COMMERCIAL

## 2019-07-01 DIAGNOSIS — E78.00 PURE HYPERCHOLESTEROLEMIA: ICD-10-CM

## 2019-07-01 DIAGNOSIS — E78.2 MIXED HYPERLIPIDEMIA: ICD-10-CM

## 2019-07-01 DIAGNOSIS — R73.03 PREDIABETES: ICD-10-CM

## 2019-07-01 DIAGNOSIS — E11.9 CONTROLLED TYPE 2 DIABETES MELLITUS WITHOUT COMPLICATION, WITHOUT LONG-TERM CURRENT USE OF INSULIN (HCC): ICD-10-CM

## 2019-07-01 LAB
ALBUMIN SERPL-MCNC: 4.5 G/DL (ref 3.4–5)
ALP BLD-CCNC: 52 U/L (ref 40–129)
ALT SERPL-CCNC: 19 U/L (ref 10–40)
AST SERPL-CCNC: 17 U/L (ref 15–37)
BILIRUB SERPL-MCNC: 0.6 MG/DL (ref 0–1)
BILIRUBIN DIRECT: <0.2 MG/DL (ref 0–0.3)
BILIRUBIN, INDIRECT: NORMAL MG/DL (ref 0–1)
CHOLESTEROL, TOTAL: 127 MG/DL (ref 0–199)
HDLC SERPL-MCNC: 33 MG/DL (ref 40–60)
LDL CHOLESTEROL CALCULATED: 68 MG/DL
TOTAL CK: 90 U/L (ref 39–308)
TOTAL PROTEIN: 7 G/DL (ref 6.4–8.2)
TRIGL SERPL-MCNC: 129 MG/DL (ref 0–150)
VLDLC SERPL CALC-MCNC: 26 MG/DL

## 2019-07-01 PROCEDURE — 36415 COLL VENOUS BLD VENIPUNCTURE: CPT | Performed by: FAMILY MEDICINE

## 2019-07-02 ENCOUNTER — TELEPHONE (OUTPATIENT)
Dept: FAMILY MEDICINE CLINIC | Age: 54
End: 2019-07-02

## 2019-07-02 DIAGNOSIS — E78.00 PURE HYPERCHOLESTEROLEMIA: ICD-10-CM

## 2019-07-02 DIAGNOSIS — E11.9 TYPE 2 DIABETES MELLITUS WITHOUT COMPLICATION, WITHOUT LONG-TERM CURRENT USE OF INSULIN (HCC): Primary | ICD-10-CM

## 2019-07-02 LAB
ESTIMATED AVERAGE GLUCOSE: 134.1 MG/DL
HBA1C MFR BLD: 6.3 %

## 2019-07-02 NOTE — TELEPHONE ENCOUNTER
Called pt and informed. Pt needing Rx for metformin 250 twice daily sent to [facility]. Pt stated he doesn't see any lines on the 500 to cut them.

## 2019-07-10 DIAGNOSIS — I10 ESSENTIAL HYPERTENSION: Primary | ICD-10-CM

## 2019-07-15 ENCOUNTER — OFFICE VISIT (OUTPATIENT)
Dept: FAMILY MEDICINE CLINIC | Age: 54
End: 2019-07-15
Payer: COMMERCIAL

## 2019-07-15 VITALS
SYSTOLIC BLOOD PRESSURE: 142 MMHG | HEIGHT: 72 IN | DIASTOLIC BLOOD PRESSURE: 90 MMHG | WEIGHT: 283 LBS | HEART RATE: 92 BPM | TEMPERATURE: 98.1 F | OXYGEN SATURATION: 97 % | BODY MASS INDEX: 38.33 KG/M2

## 2019-07-15 DIAGNOSIS — J06.9 ACUTE URI: Primary | ICD-10-CM

## 2019-07-15 PROCEDURE — 99213 OFFICE O/P EST LOW 20 MIN: CPT | Performed by: NURSE PRACTITIONER

## 2019-07-15 RX ORDER — AZITHROMYCIN 250 MG/1
250 TABLET, FILM COATED ORAL SEE ADMIN INSTRUCTIONS
Qty: 6 TABLET | Refills: 0 | Status: SHIPPED | OUTPATIENT
Start: 2019-07-15 | End: 2019-07-20

## 2019-07-15 ASSESSMENT — ENCOUNTER SYMPTOMS
COUGH: 1
SHORTNESS OF BREATH: 0
SWOLLEN GLANDS: 0
NAUSEA: 0
EYES NEGATIVE: 1
SORE THROAT: 0
ABDOMINAL PAIN: 0
CHEST TIGHTNESS: 0
DIARRHEA: 0
SINUS PAIN: 1
GASTROINTESTINAL NEGATIVE: 1
RHINORRHEA: 0
VOMITING: 0
WHEEZING: 1

## 2019-07-16 ENCOUNTER — TELEPHONE (OUTPATIENT)
Dept: FAMILY MEDICINE CLINIC | Age: 54
End: 2019-07-16

## 2019-07-16 NOTE — TELEPHONE ENCOUNTER
Dr. Gaetano Mas:    Notes: This patient has an upcoming appointment with you for Diabetes and Hyperlipidemia. In planning for that visit I have completed the following pre-visit planning:     Pre-Visit Planning Checklist:  Patient contacted: yes  Verified patient by name and date of birth: yes    Health Maintenance items reviewed:    No pre-visit planning health maintenance topics to review at this time    Labs and procedures pended:     Labs and procedures discussed with patient: yes  Reminded patient to check with their insurance company about coverage for lab tests and lab location: no    Preliminary Medication Reconciliation: was performed. Reminded patient to arrive early: yes    Please complete the med-reconciliation and sign the appropriate labs as soon as possible.       Andres Green MA  Pre-Services Specialist  3

## 2019-07-29 ENCOUNTER — OFFICE VISIT (OUTPATIENT)
Dept: FAMILY MEDICINE CLINIC | Age: 54
End: 2019-07-29
Payer: COMMERCIAL

## 2019-07-29 VITALS
SYSTOLIC BLOOD PRESSURE: 130 MMHG | HEIGHT: 73 IN | OXYGEN SATURATION: 95 % | DIASTOLIC BLOOD PRESSURE: 84 MMHG | BODY MASS INDEX: 37.83 KG/M2 | HEART RATE: 101 BPM | WEIGHT: 285.4 LBS

## 2019-07-29 DIAGNOSIS — E78.2 MIXED HYPERLIPIDEMIA: Primary | ICD-10-CM

## 2019-07-29 DIAGNOSIS — E11.9 TYPE 2 DIABETES MELLITUS WITHOUT COMPLICATION, WITHOUT LONG-TERM CURRENT USE OF INSULIN (HCC): ICD-10-CM

## 2019-07-29 DIAGNOSIS — R09.82 POST-NASAL DISCHARGE: ICD-10-CM

## 2019-07-29 DIAGNOSIS — I10 ESSENTIAL HYPERTENSION: ICD-10-CM

## 2019-07-29 DIAGNOSIS — R05.9 COUGH: ICD-10-CM

## 2019-07-29 PROCEDURE — 99214 OFFICE O/P EST MOD 30 MIN: CPT | Performed by: FAMILY MEDICINE

## 2019-07-29 NOTE — PROGRESS NOTES
Partners: Female   Lifestyle    Physical activity:     Days per week: Not on file     Minutes per session: Not on file    Stress: Not on file   Relationships    Social connections:     Talks on phone: Not on file     Gets together: Not on file     Attends Judaism service: Not on file     Active member of club or organization: Not on file     Attends meetings of clubs or organizations: Not on file     Relationship status: Not on file    Intimate partner violence:     Fear of current or ex partner: Not on file     Emotionally abused: Not on file     Physically abused: Not on file     Forced sexual activity: Not on file   Other Topics Concern    Not on file   Social History Narrative    ** Merged History Encounter **            Prior to Visit Medications    Medication Sig Taking? Authorizing Provider   metFORMIN (GLUCOPHAGE) 500 MG tablet Take 1/2 tablet BID with AC Yes Sherren Dyke, MD   rosuvastatin (CRESTOR) 5 MG tablet Take 1 tablet by mouth daily Yes Sherren Dyke, MD   nabumetone (RELAFEN) 500 MG tablet TAKE ONE TABLET BY MOUTH TWICE A DAY Yes Beverley Cruz MD   meclizine (ANTIVERT) 25 MG tablet TAKE ONE TABLET BY MOUTH ONE HOUR BEFORE TRAVEL, REPEAT EVERY 12 TO 24 HOURS IF NEEDED Yes Larina Payment, APRN - CNP   promethazine (PHENERGAN) 25 MG tablet TAKE ONE TABLET BY MOUTH EVERY 6 HOURS AS NEEDED FOR VERTIGO OR NAUSEA Yes Larina Payment, APRN - CNP   folic acid (FOLVITE) 1 MG tablet Take 1 mg by mouth daily Yes Historical Provider, MD   CLOMIPHENE CITRATE PO Take by mouth From Dr. Buzz Vidal Yes Historical Provider, MD   Omega-3 Fatty Acids (FISH OIL PO) Take 3 tablets by mouth daily Yes Historical Provider, MD   methocarbamol (ROBAXIN-750) 750 MG tablet Take 1 tablet by mouth 3 times daily Yes MK Machuca MD   tadalafil (CIALIS) 5 MG tablet Take 5 mg by mouth as needed for Erectile Dysfunction.  Yes Historical Provider, MD   traMADol (ULTRAM) 50 MG tablet A1C 07/01/2019 6.3     eAG 07/01/2019 134.1     Total Protein 07/01/2019 7.0     Alb 07/01/2019 4.5     Alkaline Phosphatase 07/01/2019 52     ALT 07/01/2019 19     AST 07/01/2019 17     Total Bilirubin 07/01/2019 0.6     Bilirubin, Direct 07/01/2019 <0.2     Bilirubin, Indirect 07/01/2019 see below     Cholesterol, Total 07/01/2019 127     Triglycerides 07/01/2019 129     HDL 07/01/2019 33*    LDL Calculated 07/01/2019 68     VLDL Cholesterol Calcula* 07/01/2019 26             ASSESSMENT PLAN      Diagnosis Orders   1. Mixed hyperlipidemia     2. Essential hypertension     3. Type 2 diabetes mellitus without complication, without long-term current use of insulin ([address]     4. Post-nasal discharge     5. Cough  XR CHEST STANDARD (2 VW)   LDL has dropped from 136 last November down to 68. Blood pressure acceptable. Explained again that the rosuvastatin is being used to reduce cardiovascular risk and he is tolerating it. Hemoglobin A1c has dropped from 6.8 down to 6.3. I told him metformin would not cause weight gain. He is having some cough usually in the morning at home is likely allergy or posterior nasal drainage. Symptomatic treatment for now. Continue Mucinex as he is already been doing. Investigate if symptoms change or worsen pattern we will get a chest x-ray first.  Follow-up 6 months. At times since the cough did not linger all day but just in the morning makes it more likely to be allergies and less likely to be a serious condition. However, to be safe we will order a chest x-ray. Patient should call the office immediately with new or ongoing signs or symptoms or worsening, or proceed to the emergency room. No changes in past medical history, past surgical history, social history, or family history were noted during the patient encounter unless specifically listed above.   All updates of past medical history, past surgical history, social history, or family history were reviewed

## 2019-08-01 ENCOUNTER — HOSPITAL ENCOUNTER (OUTPATIENT)
Age: 54
Discharge: HOME | End: 2019-08-01
Payer: COMMERCIAL

## 2019-08-01 ENCOUNTER — HOSPITAL ENCOUNTER (OUTPATIENT)
Dept: GENERAL RADIOLOGY | Age: 54
Discharge: HOME | End: 2019-08-01
Payer: COMMERCIAL

## 2019-08-01 DIAGNOSIS — R05.9 COUGH: ICD-10-CM

## 2019-08-01 PROCEDURE — 71046 X-RAY EXAM CHEST 2 VIEWS: CPT

## 2019-08-02 ENCOUNTER — TELEPHONE (OUTPATIENT)
Dept: FAMILY MEDICINE CLINIC | Age: 54
End: 2019-08-02

## 2019-10-28 ENCOUNTER — OFFICE VISIT (OUTPATIENT)
Dept: ORTHOPEDIC SURGERY | Age: 54
End: 2019-10-28
Payer: OTHER GOVERNMENT

## 2019-10-28 DIAGNOSIS — M17.11 PRIMARY OSTEOARTHRITIS OF RIGHT KNEE: Primary | ICD-10-CM

## 2019-10-28 PROCEDURE — 99213 OFFICE O/P EST LOW 20 MIN: CPT | Performed by: ORTHOPAEDIC SURGERY

## 2019-10-29 ENCOUNTER — OFFICE VISIT (OUTPATIENT)
Dept: ORTHOPEDIC SURGERY | Age: 54
End: 2019-10-29
Payer: OTHER GOVERNMENT

## 2019-10-29 DIAGNOSIS — G56.01 CARPAL TUNNEL SYNDROME ON RIGHT: Primary | ICD-10-CM

## 2019-10-29 PROCEDURE — 99213 OFFICE O/P EST LOW 20 MIN: CPT | Performed by: ORTHOPAEDIC SURGERY

## 2019-10-31 ENCOUNTER — OFFICE VISIT (OUTPATIENT)
Dept: ORTHOPEDIC SURGERY | Age: 54
End: 2019-10-31
Payer: COMMERCIAL

## 2019-10-31 VITALS — RESPIRATION RATE: 12 BRPM | BODY MASS INDEX: 37.84 KG/M2 | WEIGHT: 285.5 LBS | HEIGHT: 73 IN

## 2019-10-31 DIAGNOSIS — M17.12 PRIMARY OSTEOARTHRITIS OF LEFT KNEE: Primary | ICD-10-CM

## 2019-10-31 PROCEDURE — 99213 OFFICE O/P EST LOW 20 MIN: CPT | Performed by: ORTHOPAEDIC SURGERY

## 2019-11-14 ENCOUNTER — OFFICE VISIT (OUTPATIENT)
Dept: ORTHOPEDIC SURGERY | Age: 54
End: 2019-11-14
Payer: OTHER GOVERNMENT

## 2019-11-14 DIAGNOSIS — M17.11 PRIMARY OSTEOARTHRITIS OF RIGHT KNEE: Primary | ICD-10-CM

## 2019-11-14 PROCEDURE — 20610 DRAIN/INJ JOINT/BURSA W/O US: CPT | Performed by: ORTHOPAEDIC SURGERY

## 2019-11-15 ENCOUNTER — TELEPHONE (OUTPATIENT)
Dept: ORTHOPEDIC SURGERY | Age: 54
End: 2019-11-15

## 2019-11-18 ENCOUNTER — OFFICE VISIT (OUTPATIENT)
Dept: ORTHOPEDIC SURGERY | Age: 54
End: 2019-11-18
Payer: OTHER GOVERNMENT

## 2019-11-18 VITALS — WEIGHT: 285.5 LBS | BODY MASS INDEX: 37.84 KG/M2 | HEIGHT: 73 IN

## 2019-11-18 DIAGNOSIS — M47.812 CERVICAL SPONDYLOSIS WITHOUT MYELOPATHY: ICD-10-CM

## 2019-11-18 DIAGNOSIS — S13.4XXD WHIPLASH INJURY TO NECK, SUBSEQUENT ENCOUNTER: ICD-10-CM

## 2019-11-18 DIAGNOSIS — S16.1XXD CERVICAL STRAIN, SUBSEQUENT ENCOUNTER: Primary | ICD-10-CM

## 2019-11-18 PROCEDURE — 99212 OFFICE O/P EST SF 10 MIN: CPT | Performed by: PHYSICAL MEDICINE & REHABILITATION

## 2019-11-20 DIAGNOSIS — G56.01 CARPAL TUNNEL SYNDROME ON RIGHT: Primary | ICD-10-CM

## 2019-11-20 RX ORDER — METHYLPREDNISOLONE 4 MG/1
TABLET ORAL
Qty: 1 KIT | Refills: 0 | Status: SHIPPED | OUTPATIENT
Start: 2019-11-20 | End: 2019-11-26

## 2019-12-24 ENCOUNTER — TELEPHONE (OUTPATIENT)
Dept: ORTHOPEDIC SURGERY | Age: 54
End: 2019-12-24

## 2019-12-30 ENCOUNTER — TELEPHONE (OUTPATIENT)
Dept: ORTHOPEDIC SURGERY | Age: 54
End: 2019-12-30

## 2020-01-23 ENCOUNTER — OFFICE VISIT (OUTPATIENT)
Dept: ORTHOPEDIC SURGERY | Age: 55
End: 2020-01-23
Payer: OTHER GOVERNMENT

## 2020-01-23 PROBLEM — M17.12 PRIMARY OSTEOARTHRITIS OF LEFT KNEE: Status: ACTIVE | Noted: 2020-01-23

## 2020-01-23 PROCEDURE — 20610 DRAIN/INJ JOINT/BURSA W/O US: CPT | Performed by: ORTHOPAEDIC SURGERY

## 2020-01-23 NOTE — PROGRESS NOTES
Recommendation is for viscosupplementation using Durolane. The left knee was injected with 4ml of Durolane from an anterolateral joint line approach using a 22-gauge needle under sterile Betadine prep, using ethyl chloride as a topical refrigerant, for a diagnosis of osteoarthritis. The patient appeared to tolerate it well. The patient should return here in 2 months.

## 2020-01-24 ENCOUNTER — NURSE ONLY (OUTPATIENT)
Dept: FAMILY MEDICINE CLINIC | Age: 55
End: 2020-01-24
Payer: COMMERCIAL

## 2020-01-24 LAB
A/G RATIO: 2 (ref 1.1–2.2)
ALBUMIN SERPL-MCNC: 4.4 G/DL (ref 3.4–5)
ALP BLD-CCNC: 52 U/L (ref 40–129)
ALT SERPL-CCNC: 29 U/L (ref 10–40)
ANION GAP SERPL CALCULATED.3IONS-SCNC: 14 MMOL/L (ref 3–16)
AST SERPL-CCNC: 21 U/L (ref 15–37)
BILIRUB SERPL-MCNC: 0.5 MG/DL (ref 0–1)
BUN BLDV-MCNC: 16 MG/DL (ref 7–20)
CALCIUM SERPL-MCNC: 9.3 MG/DL (ref 8.3–10.6)
CHLORIDE BLD-SCNC: 103 MMOL/L (ref 99–110)
CHOLESTEROL, FASTING: 150 MG/DL (ref 0–199)
CO2: 22 MMOL/L (ref 21–32)
CREAT SERPL-MCNC: 0.7 MG/DL (ref 0.9–1.3)
GFR AFRICAN AMERICAN: >60
GFR NON-AFRICAN AMERICAN: >60
GLOBULIN: 2.2 G/DL
GLUCOSE BLD-MCNC: 143 MG/DL (ref 70–99)
HDLC SERPL-MCNC: 36 MG/DL (ref 40–60)
LDL CHOLESTEROL CALCULATED: 86 MG/DL
POTASSIUM SERPL-SCNC: 4.6 MMOL/L (ref 3.5–5.1)
SODIUM BLD-SCNC: 139 MMOL/L (ref 136–145)
TOTAL PROTEIN: 6.6 G/DL (ref 6.4–8.2)
TRIGLYCERIDE, FASTING: 142 MG/DL (ref 0–150)
VLDLC SERPL CALC-MCNC: 28 MG/DL

## 2020-01-24 PROCEDURE — 36415 COLL VENOUS BLD VENIPUNCTURE: CPT | Performed by: FAMILY MEDICINE

## 2020-01-25 LAB
ESTIMATED AVERAGE GLUCOSE: 151.3 MG/DL
HBA1C MFR BLD: 6.9 %

## 2020-01-27 ENCOUNTER — OFFICE VISIT (OUTPATIENT)
Dept: FAMILY MEDICINE CLINIC | Age: 55
End: 2020-01-27
Payer: COMMERCIAL

## 2020-01-27 VITALS
BODY MASS INDEX: 38.4 KG/M2 | OXYGEN SATURATION: 97 % | DIASTOLIC BLOOD PRESSURE: 84 MMHG | SYSTOLIC BLOOD PRESSURE: 146 MMHG | WEIGHT: 291 LBS | HEART RATE: 92 BPM

## 2020-01-27 PROBLEM — R41.3 MEMORY LOSS: Status: ACTIVE | Noted: 2020-01-27

## 2020-01-27 PROCEDURE — 99214 OFFICE O/P EST MOD 30 MIN: CPT | Performed by: FAMILY MEDICINE

## 2020-01-27 RX ORDER — LOSARTAN POTASSIUM 50 MG/1
50 TABLET ORAL DAILY
Qty: 30 TABLET | Refills: 11 | Status: SHIPPED | OUTPATIENT
Start: 2020-01-27 | End: 2020-02-17 | Stop reason: SDUPTHER

## 2020-01-27 ASSESSMENT — PATIENT HEALTH QUESTIONNAIRE - PHQ9
SUM OF ALL RESPONSES TO PHQ QUESTIONS 1-9: 0
SUM OF ALL RESPONSES TO PHQ QUESTIONS 1-9: 0
SUM OF ALL RESPONSES TO PHQ9 QUESTIONS 1 & 2: 0
1. LITTLE INTEREST OR PLEASURE IN DOING THINGS: 0
2. FEELING DOWN, DEPRESSED OR HOPELESS: 0

## 2020-01-27 NOTE — PATIENT INSTRUCTIONS
Please get your labs checked in 3 weeks due to new med for kidney protection and blood pressure. Have a blood pressure check in 3 weeks.     Increase your metformin to 1 tab three times a day

## 2020-01-27 NOTE — PROGRESS NOTES
Chief Complaint   Patient presents with    Diabetes    Hyperlipidemia    Hypertension    Other     patient has multiple medical complaints   He has gained some weight. Discussed his blood pressure being up today, he does not check this at home. Hgb A1C went up to 6.9, advised starting a med for kidney protection, losartan 50 mg daily. He thinks the Crestor 5 mg daily is affecting his memory, he states he is forgetful. States this happened in the past when he was on a lipid medication. Studies say less than 1% of the time. Advised stopping the medication for 1 month and see if the memory improves. Also discussed his risk of heart attack or stroke is a higher risk. Advised he could try breaking it in 1/2 if desired. He states eventually he is going to start exercising. Dr Waldemar Coronado gave him another shot in his left knee. Advised increasing the metformin to 3 tabs per day he would like to take 1 tid. Will have him follow up in 3 months. HPI:  Kaye Granado is a 53 y.o. (: 1965) here today for    Is here for high blood pressure. Watching salt intake. Numbers are good. Denies chest pain. Denies shortness of breath. Taking medications as prescribed. Is here for diabetes. Numbers are good. No increased thirst or increased urination. No dizziness, shakiness, or cold sweats. Is here for cholesterol. Tolerating medication. Trying to watch low-fat diet. Weight stable. No change in exercise. Patient's medications, allergies, past medical, surgical, social and family histories were reviewed and updated asappropriate on 2020 at 9:56 AM.    ROS:  Review of Systems    All other systems reviewed and are negative except as noted above on 2020 at 9:56 AM. Additional review of systems may be scanned into the media section ofthis medical record. Any responses requiring further intervention were pursued.     Hemoglobin A1C (%)   Date Value   2020 6.9     Microscopic Examination (no units)   Date Value   2015 Not Indicated     LDL Calculated (mg/dL)   Date Value   2020 86     Past Medical History:   Diagnosis Date    Carpal tunnel syndrome 10/24/2013    Herniated disc     Hypertension     No history of procedure 2016    Primary localized osteoarthrosis, lower leg 10/17/2013        Family History   Problem Relation Age of Onset    Early Death Father     Heart Disease Father     Cancer Maternal Aunt     Cancer Maternal Grandmother      Social History     Socioeconomic History    Marital status: Single     Spouse name: Not on file    Number of children: Not on file    Years of education: Not on file    Highest education level: Not on file   Occupational History    Not on file   Social Needs    Financial resource strain: Not on file    Food insecurity:     Worry: Not on file     Inability: Not on file    Transportation needs:     Medical: Not on file     Non-medical: Not on file   Tobacco Use    Smoking status: Former Smoker     Packs/day: 1.00     Years: 6.00     Pack years: 6.00     Types: Cigarettes     Last attempt to quit: 1999     Years since quittin.0    Smokeless tobacco: Never Used    Tobacco comment: Patient does not know how many packs per day, he has not smoked in ovver 20 years   Substance and Sexual Activity    Alcohol use: Yes     Comment: rarely    Drug use: No    Sexual activity: Yes     Partners: Female   Lifestyle    Physical activity:     Days per week: Not on file     Minutes per session: Not on file    Stress: Not on file   Relationships    Social connections:     Talks on phone: Not on file     Gets together: Not on file     Attends [employer] service: Not on file     Active member of club or organization: Not on file     Attends meetings of clubs or organizations: Not on file     Relationship status: Not on file    Intimate partner violence:     Fear of current or ex partner: Not on file     Emotionally abused: Not on file     Physically abused: Not on file     Forced sexual activity: Not on file   Other Topics Concern    Not on file   Social History Narrative    ** Merged History Encounter **            Prior to Visit Medications    Medication Sig Taking? Authorizing Provider   metFORMIN (GLUCOPHAGE) 500 MG tablet Take 1 tablet BID with AC Yes Max Rai MD   rosuvastatin (CRESTOR) 5 MG tablet Take 1 tablet by mouth daily Yes Max Rai MD   nabumetone (RELAFEN) 500 MG tablet TAKE ONE TABLET BY MOUTH TWICE A DAY Yes Lamar Farah MD   meclizine (ANTIVERT) 25 MG tablet TAKE ONE TABLET BY MOUTH ONE HOUR BEFORE TRAVEL, REPEAT EVERY 12 TO 24 HOURS IF NEEDED Yes SUGAR Guillory CNP   promethazine (PHENERGAN) 25 MG tablet TAKE ONE TABLET BY MOUTH EVERY 6 HOURS AS NEEDED FOR VERTIGO OR NAUSEA Yes SUGAR Guillory CNP   folic acid (FOLVITE) 1 MG tablet Take 1 mg by mouth daily Yes Historical Provider, MD   CLOMIPHENE CITRATE PO Take by mouth From Dr. Yousuf Golden Yes Historical Provider, MD   Omega-3 Fatty Acids (FISH OIL PO) Take 3 tablets by mouth daily Yes Historical Provider, MD   methocarbamol (ROBAXIN-750) 750 MG tablet Take 1 tablet by mouth 3 times daily Yes MK Li MD   tadalafil (CIALIS) 5 MG tablet Take 5 mg by mouth as needed for Erectile Dysfunction. Yes Historical Provider, MD   traMADol (ULTRAM) 50 MG tablet Take 50 mg by mouth every 6 hours as needed. Yes Historical Provider, MD   aspirin 81 MG tablet Take 81 mg by mouth daily. Yes Historical Provider, MD     Allergies   Allergen Reactions    Dye [Iodides]     Iodine Swelling    Penicillins Rash       OBJECTIVE:  Estimated body mass index is 38.4 kg/m² as calculated from the following:    Height as of 11/18/19: 6' 0.99\" (1.854 m). Weight as of this encounter: 291 lb (132 kg).   Vitals:    01/27/20 0952   BP: (!) 146/88   Site: Left Upper Arm   Position: Sitting   Cuff Size: Medium Adult   Pulse: 92   SpO2: 97%   Weight: 291 lb (132 kg)     BP Readings from Last 2 Encounters:   01/27/20 (!) 146/88   07/29/19 130/84     Wt Readings from Last 3 Encounters:   01/27/20 291 lb (132 kg)   11/18/19 285 lb 7.9 oz (129.5 kg)   10/31/19 285 lb 7.9 oz (129.5 kg)       Physical Exam  Vitals signs and nursing note reviewed. Constitutional:       General: He is not in acute distress. Appearance: He is well-developed. He is not diaphoretic. HENT:      Head: Normocephalic and atraumatic. Right Ear: External ear normal.      Left Ear: External ear normal.      Nose: Nose normal.   Eyes:      General: Lids are normal. No scleral icterus. Right eye: No discharge. Left eye: No discharge. Pupils: Pupils are equal, round, and reactive to light. Neck:      Thyroid: No thyromegaly. Vascular: No JVD. Cardiovascular:      Rate and Rhythm: Normal rate and regular rhythm. Heart sounds: Normal heart sounds. Pulmonary:      Effort: Pulmonary effort is normal. No respiratory distress. Breath sounds: Normal breath sounds. Abdominal:      Palpations: Abdomen is soft. There is no hepatomegaly or splenomegaly. Tenderness: There is no abdominal tenderness. Skin:     General: Skin is warm and dry. Coloration: Skin is not pale. Findings: No erythema or rash. Comments: Turgor normal   Psychiatric:         Behavior: Behavior normal.         Thought Content: Thought content normal.         Judgment: Judgment normal.              ASSESSMENT PLAN      Diagnosis Orders   1. Mixed hyperlipidemia     2. Pure hypercholesterolemia     3. Essential hypertension  losartan (COZAAR) 50 MG tablet    Basic Metabolic Panel   4. Obesity (BMI 30-39.9)     5. Family history of ASCVD (arteriosclerotic cardiovascular disease)     6. Cardiovascular risk factor     7.  Type 2 diabetes mellitus without complication, without long-term current use of insulin (HCC)  metFORMIN possible impairments from medications were discussed as applicable. This document was prepared by a combination of typing and transcription through a voice recognition software. Scribe attestation: Tammi Goldstein RN, am scribing for and in the presence of Chalino Tubbs MD. Electronically signed by Liliana Coronel RN on 1/27/2020 at 9:56 AM      Provider attestation:     I, Dr. Leeanna Olivas, personally performed the services described in this documentation, as scribed by the above signed scribe in my presence, and it is both accurate and complete. I agree with the ROS and Past Histories independently gathered by the clinical support staff and the remaining scribed note accurately describes my personal service to the patient.       1/27/2020    10:19 AM

## 2020-02-17 ENCOUNTER — TELEPHONE (OUTPATIENT)
Dept: FAMILY MEDICINE CLINIC | Age: 55
End: 2020-02-17

## 2020-02-17 ENCOUNTER — NURSE ONLY (OUTPATIENT)
Dept: FAMILY MEDICINE CLINIC | Age: 55
End: 2020-02-17
Payer: COMMERCIAL

## 2020-02-17 LAB
ANION GAP SERPL CALCULATED.3IONS-SCNC: 13 MMOL/L (ref 3–16)
BUN BLDV-MCNC: 14 MG/DL (ref 7–20)
CALCIUM SERPL-MCNC: 8.9 MG/DL (ref 8.3–10.6)
CHLORIDE BLD-SCNC: 105 MMOL/L (ref 99–110)
CO2: 23 MMOL/L (ref 21–32)
CREAT SERPL-MCNC: 0.7 MG/DL (ref 0.9–1.3)
GFR AFRICAN AMERICAN: >60
GFR NON-AFRICAN AMERICAN: >60
GLUCOSE BLD-MCNC: 128 MG/DL (ref 70–99)
POTASSIUM SERPL-SCNC: 4.6 MMOL/L (ref 3.5–5.1)
SODIUM BLD-SCNC: 141 MMOL/L (ref 136–145)

## 2020-02-17 PROCEDURE — 36415 COLL VENOUS BLD VENIPUNCTURE: CPT | Performed by: FAMILY MEDICINE

## 2020-02-17 RX ORDER — LOSARTAN POTASSIUM 100 MG/1
100 TABLET ORAL DAILY
Qty: 30 TABLET | Refills: 11 | Status: SHIPPED | OUTPATIENT
Start: 2020-02-17 | End: 2021-02-23

## 2020-02-17 NOTE — TELEPHONE ENCOUNTER
Increase Cozaar to 100 mg by mouth daily and recheck blood pressure in 2 weeks, if BMP is abnormal it should be rechecked in 2 weeks as well

## 2020-03-02 ENCOUNTER — TELEPHONE (OUTPATIENT)
Dept: FAMILY MEDICINE CLINIC | Age: 55
End: 2020-03-02

## 2020-03-02 VITALS — DIASTOLIC BLOOD PRESSURE: 84 MMHG | SYSTOLIC BLOOD PRESSURE: 133 MMHG | HEART RATE: 96 BPM

## 2020-03-30 ENCOUNTER — TELEPHONE (OUTPATIENT)
Dept: FAMILY MEDICINE CLINIC | Age: 55
End: 2020-03-30

## 2020-04-15 ENCOUNTER — TELEPHONE (OUTPATIENT)
Dept: FAMILY MEDICINE CLINIC | Age: 55
End: 2020-04-15

## 2020-05-06 ENCOUNTER — TELEPHONE (OUTPATIENT)
Dept: ADMINISTRATIVE | Age: 55
End: 2020-05-06

## 2020-05-06 NOTE — TELEPHONE ENCOUNTER
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 Please advise Pt if his request can be accommodated.     #: [phone]

## 2020-05-20 ENCOUNTER — OFFICE VISIT (OUTPATIENT)
Dept: ORTHOPEDIC SURGERY | Age: 55
End: 2020-05-20
Payer: OTHER GOVERNMENT

## 2020-05-20 VITALS — BODY MASS INDEX: 38.57 KG/M2 | RESPIRATION RATE: 12 BRPM | WEIGHT: 291.01 LBS | HEIGHT: 73 IN

## 2020-05-20 PROCEDURE — 99213 OFFICE O/P EST LOW 20 MIN: CPT | Performed by: ORTHOPAEDIC SURGERY

## 2020-05-20 NOTE — PROGRESS NOTES
NEEDED 30 tablet 0    promethazine (PHENERGAN) 25 MG tablet TAKE ONE TABLET BY MOUTH EVERY 6 HOURS AS NEEDED FOR VERTIGO OR NAUSEA 20 tablet 0    folic acid (FOLVITE) 1 MG tablet Take 1 mg by mouth daily      CLOMIPHENE CITRATE PO Take by mouth From Dr. Guerrero Day (FISH OIL PO) Take 3 tablets by mouth daily      methocarbamol (ROBAXIN-750) 750 MG tablet Take 1 tablet by mouth 3 times daily 90 tablet 3    tadalafil (CIALIS) 5 MG tablet Take 5 mg by mouth as needed for Erectile Dysfunction.  traMADol (ULTRAM) 50 MG tablet Take 50 mg by mouth every 6 hours as needed.  aspirin 81 MG tablet Take 81 mg by mouth daily. No current facility-administered medications for this visit.         Social    Social History     Socioeconomic History    Marital status: Single     Spouse name: Not on file    Number of children: Not on file    Years of education: Not on file    Highest education level: Not on file   Occupational History    Not on file   Social Needs    Financial resource strain: Not on file    Food insecurity     Worry: Not on file     Inability: Not on file    Transportation needs     Medical: Not on file     Non-medical: Not on file   Tobacco Use    Smoking status: Former Smoker     Packs/day: 1.00     Years: 6.00     Pack years: 6.00     Types: Cigarettes     Last attempt to quit: 1999     Years since quittin.3    Smokeless tobacco: Never Used    Tobacco comment: Patient does not know how many packs per day, he has not smoked in ovver 20 years   Substance and Sexual Activity    Alcohol use: Yes     Comment: rarely    Drug use: No    Sexual activity: Yes     Partners: Female   Lifestyle    Physical activity     Days per week: Not on file     Minutes per session: Not on file    Stress: Not on file   Relationships    Social connections     Talks on phone: Not on file     Gets together: Not on file     Attends [employer] service: Not on file     Active

## 2020-05-21 ENCOUNTER — OFFICE VISIT (OUTPATIENT)
Dept: ORTHOPEDIC SURGERY | Age: 55
End: 2020-05-21
Payer: OTHER GOVERNMENT

## 2020-05-21 VITALS — HEIGHT: 73 IN | WEIGHT: 291.01 LBS | BODY MASS INDEX: 38.57 KG/M2

## 2020-05-21 PROCEDURE — 99212 OFFICE O/P EST SF 10 MIN: CPT | Performed by: PHYSICAL MEDICINE & REHABILITATION

## 2020-05-21 NOTE — PROGRESS NOTES
Follow up: SPINE    CHIEF COMPLAINT:    Chief Complaint   Patient presents with    Neck Pain     fu neck       HISTORY OF PRESENT ILLNESS:                The patient is a 63 y.o. male here for 6 month follow up. Is here for his chronic neck discomfort. 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 He has tightness in his neck. He has no radiating arm pain. His symptoms have been stable. No numbness tingling weakness. No coordination issues    He does have some knee issues and undergoing conservative care with Dr. Isidra Duarte      Past/Current Treatment     PT: YES  Chiro: Massage   Injections: MYRIAM in the past without relief  Medications: NSAIDs, Robaxin-both occasionally  Surgery: no h/o R CTR w/Dr. Isidra Duarte       Past Medical History: Medical history form was reviewed today and scanned into the media tab. Past Medical History:   Diagnosis Date    Carpal tunnel syndrome 10/24/2013    Herniated disc     Hypertension     No history of procedure 11/04/2016    Primary localized osteoarthrosis, lower leg 10/17/2013        REVIEW OF SYSTEMS:   CONSTITUTIONAL: Denies unexplained weight loss, fevers, chills or fatigue  NEUROLOGIC: Denies tremors or seizures         PHYSICAL EXAM:    Vitals: Height 6' 0.99\" (1.854 m), weight 291 lb 0.1 oz (132 kg). GENERAL EXAM:  · General Apparence: Patient is adequately groomed with no evidence of malnutrition. · Orientation: The patient is oriented to time, place and person. · Mood & Affect:The patient's mood and affect are appropriate   · Lymphatic: The lymphatic examination bilaterally reveals all areas to be without enlargement or induration  · Sensation: Sensation is intact without deficit  · Coordination/Balance: Good coordination     CERVICAL EXAMINATION:  · Inspection: Local inspection shows no step-off or bruising. · Palpation: No evidence of tenderness at the midline. .    · Range of Motion: Mild loss of flexion and extension, moderate loss of rotation to

## 2020-05-26 ENCOUNTER — OFFICE VISIT (OUTPATIENT)
Dept: ORTHOPEDIC SURGERY | Age: 55
End: 2020-05-26
Payer: OTHER GOVERNMENT

## 2020-05-26 VITALS — BODY MASS INDEX: 11.22 KG/M2 | WEIGHT: 84.66 LBS | HEIGHT: 73 IN

## 2020-05-26 PROCEDURE — 99213 OFFICE O/P EST LOW 20 MIN: CPT | Performed by: ORTHOPAEDIC SURGERY

## 2020-05-26 NOTE — PROGRESS NOTES
CARPAL TUNNEL VISIT        HISTORY OF PRESENT ILLNESS    Dylan Herbert is a 63 y.o. male who presents for evaluation of his right hand. He still has about the same amount of discomfort which is tolerable but he is having still increasingly severe carpal tunnel symptoms in the left hand. ROS    Constitutional:  Denies fever or chills   Eyes:  Denies change in visual acuity   HENT:  Denies nasal congestion or sore throat   Respiratory:  Denies cough or shortness of breath   Cardiovascular:  Denies chest pain or edema   GI:  Denies abdominal pain, nausea, vomiting, bloody stools or diarrhea   :  Denies dysuria   Musculoskeletal:  He has some issues with his knees  Integument:  Denies rash   Neurologic:  Denies headache, focal weakness or sensory changes   Endocrine:  Denies polyuria or polydipsia   Lymphatic:  Denies swollen glands   Psychiatric:  Denies depression or anxiety   All other ROS negative except for above.     Past Surgical history    Past Surgical History:   Procedure Laterality Date    CARPAL TUNNEL RELEASE      COLONOSCOPY  11/04/2016    normal    CYST REMOVAL      HAND SURGERY      HERNIA REPAIR      KNEE ARTHROSCOPY      KNEE ARTHROSCOPY Right 12/01/2014    KNEE ARTHROSCOPY Left 11/12/2018    KNEE VIDEO ARTHROSCOPY, MEDIAL MENISECTOMY, CHONDROPLASTY, INTERNAL FIXATION MEDIAL TIBIAL PLATEAU INSUFFICIENCY FRACTURE WITH BONE SUBSTITUTE     KNEE SURGERY      [place] KNEE SCOPE, ALLOGRAFT IMPANT Left 11/12/2018    KNEE VIDEO ARTHROSCOPY, MEDIAL MENISECTOMY, CHONDROPLASTY, INTERNAL FIXATION MEDIAL TIBIAL PLATEAU INSUFFICIENCY FRACTURE WITH BONE SUBSTITUTE performed by Brock Cuellar MD at [address] [place] [address]    Past Medical History:   Diagnosis Date    Carpal tunnel syndrome 10/24/2013    Herniated disc     Hypertension     No history of procedure 11/04/2016    Primary localized osteoarthrosis, lower leg 10/17/2013       Allergies    Allergies   Allergen Reactions    Dye [Iodides] packs per day, he has not smoked in ovver 20 years   Substance and Sexual Activity    Alcohol use: Yes     Comment: rarely    Drug use: No    Sexual activity: Yes     Partners: Female   Lifestyle    Physical activity     Days per week: Not on file     Minutes per session: Not on file    Stress: Not on file   Relationships    Social connections     Talks on phone: Not on file     Gets together: Not on file     Attends Catholic service: Not on file     Active member of club or organization: Not on file     Attends meetings of clubs or organizations: Not on file     Relationship status: Not on file    Intimate partner violence     Fear of current or ex partner: Not on file     Emotionally abused: Not on file     Physically abused: Not on file     Forced sexual activity: Not on file   Other Topics Concern    Not on file   Social History Narrative    ** Merged History Encounter **            Family HISTORY    Family History   Problem Relation Age of Onset    Early Death Father     Heart Disease Father     Cancer Maternal Aunt     Cancer Maternal Grandmother        PHYSICAL EXAM    Vital Signs:  Ht 6' 0.99\" (1.854 m)   Wt 84 lb 10.5 oz (38.4 kg)   BMI 11.17 kg/m²   General Appearance:  Normal body habitus. Alert and oriented to person, place, and time. Affect:  Normal.   Gait:  Antalgic due to recent left knee surgery. Reflexes:  Intact.    Pulses:  Normal.   Skin:  Normal.     Wrist Exam  Hand dominance - Right  Surface Exam - no cutaneous lesions are seen      Right Hand Exam:      Neurologic Exam:    Reflexes:  Normal  Phalens:  Negative in the right but positive in the left  Tinels:  Negative, but he does have pain to compression and soreness with repetitive use in the left wrist.  Median Nerve Compression:  Negative  Thenar strength:  Normal in the right and diminished in the left  Thenar atrophy:  None noted on either side  2 PD:  Normal in the right but somewhat diminished in the left  Elbow Flexion Test:  Negative . Finkelstein's test, but has some pain in the base of thumb pressure  Wrist Motion Right Left   DF     PF     RD     CMC     UD     SUP     PRO       NCV / EMG STUDIES    None    IMAGING STUDIES    None    IMPRESSION    Right hand pain status post carpal tunnel release    PLAN    1. Conservative care options including physical therapy, NSAIDs, bracing, and activity modification were discussed. 2.  The indications for therapeutic injections were discussed. 3.  The indications for additional imaging studies were discussed. 4.  After considering the various options discussed, the patient elected to pursue a course that includes requesting cortisone injection into the right carpal tunnel. I will see her right away to request the diagnosis of left carpal tunnel syndrome to be applied to his claim as a diagnosis that has arisen through compensation and flow-through.

## 2020-11-18 ENCOUNTER — OFFICE VISIT (OUTPATIENT)
Dept: ORTHOPEDIC SURGERY | Age: 55
End: 2020-11-18
Payer: OTHER GOVERNMENT

## 2020-11-18 PROCEDURE — 99213 OFFICE O/P EST LOW 20 MIN: CPT | Performed by: ORTHOPAEDIC SURGERY

## 2020-11-18 NOTE — PROGRESS NOTES
KNEE VISIT      HISTORY OF PRESENT ILLNESS    Josephine Meadows is a 63 y.o. male who presents for reevaluation of his right knee. He needs to have his restrictions updated. Apparently his employer has tried to place him in a job position that is not consistent with the guidelines of restriction that I placed on him. He had mentioned that possibly they thought he was forging my signature onto his restrictions. ROS    Well-documented in the patient medical history form dated 1/23/2020  All other ROS negative except for above.     Past Surgical history    Past Surgical History:   Procedure Laterality Date    CARPAL TUNNEL RELEASE      COLONOSCOPY  11/04/2016    normal    CYST REMOVAL      HAND SURGERY      HERNIA REPAIR      KNEE ARTHROSCOPY      KNEE ARTHROSCOPY Right 12/01/2014    KNEE ARTHROSCOPY Left 11/12/2018    KNEE VIDEO ARTHROSCOPY, MEDIAL MENISECTOMY, CHONDROPLASTY, INTERNAL FIXATION MEDIAL TIBIAL PLATEAU INSUFFICIENCY FRACTURE WITH BONE SUBSTITUTE     KNEE SURGERY      [place] KNEE SCOPE, ALLOGRAFT IMPANT Left 11/12/2018    KNEE VIDEO ARTHROSCOPY, MEDIAL MENISECTOMY, CHONDROPLASTY, INTERNAL FIXATION MEDIAL TIBIAL PLATEAU INSUFFICIENCY FRACTURE WITH BONE SUBSTITUTE performed by Elissa Vera MD at [address] [place] [address]    Past Medical History:   Diagnosis Date    Carpal tunnel syndrome 10/24/2013    Herniated disc     Hypertension     No history of procedure 11/04/2016    Primary localized osteoarthrosis, lower leg 10/17/2013       Allergies    Allergies   Allergen Reactions    Dye [Iodides]     Iodine Swelling    Penicillins Rash       Meds    Current Outpatient Medications   Medication Sig Dispense Refill    losartan (COZAAR) 100 MG tablet Take 1 tablet by mouth daily 30 tablet 11    metFORMIN (GLUCOPHAGE) 500 MG tablet 1 tab tid 90 tablet 11    nabumetone (RELAFEN) 500 MG tablet TAKE ONE TABLET BY MOUTH TWICE A DAY 60 tablet 1    meclizine (ANTIVERT) 25 MG tablet TAKE ONE TABLET BY MOUTH ONE HOUR BEFORE TRAVEL, REPEAT EVERY 12 TO 24 HOURS IF NEEDED 30 tablet 0    promethazine (PHENERGAN) 25 MG tablet TAKE ONE TABLET BY MOUTH EVERY 6 HOURS AS NEEDED FOR VERTIGO OR NAUSEA 20 tablet 0    folic acid (FOLVITE) 1 MG tablet Take 1 mg by mouth daily      CLOMIPHENE CITRATE PO Take by mouth From Dr. Carleen Scruggs (FISH OIL PO) Take 3 tablets by mouth daily      methocarbamol (ROBAXIN-750) 750 MG tablet Take 1 tablet by mouth 3 times daily 90 tablet 3    tadalafil (CIALIS) 5 MG tablet Take 5 mg by mouth as needed for Erectile Dysfunction.  traMADol (ULTRAM) 50 MG tablet Take 50 mg by mouth every 6 hours as needed.  aspirin 81 MG tablet Take 81 mg by mouth daily. No current facility-administered medications for this visit.         Social    Social History     Socioeconomic History    Marital status: Single     Spouse name: Not on file    Number of children: Not on file    Years of education: Not on file    Highest education level: Not on file   Occupational History    Not on file   Social Needs    Financial resource strain: Not on file    Food insecurity     Worry: Not on file     Inability: Not on file    Transportation needs     Medical: Not on file     Non-medical: Not on file   Tobacco Use    Smoking status: Former Smoker     Packs/day: 1.00     Years: 6.00     Pack years: 6.00     Types: Cigarettes     Last attempt to quit: 1999     Years since quittin.8    Smokeless tobacco: Never Used    Tobacco comment: Patient does not know how many packs per day, he has not smoked in ovver 20 years   Substance and Sexual Activity    Alcohol use: Yes     Comment: rarely    Drug use: No    Sexual activity: Yes     Partners: Female   Lifestyle    Physical activity     Days per week: Not on file     Minutes per session: Not on file    Stress: Not on file   Relationships    Social connections     Talks on phone: Not on file     Gets 2.  The indications for therapeutic injections were discussed. 3.  The indications for additional imaging studies were discussed. 4.  After considering the various options discussed, the patient elected to pursue a course that includes periodic evaluation and maintain restrictions as I signed on his form today. Due to the nature of his condition he should not be doing anything different than he has been doing for the last several years. Jermaine Hughes

## 2020-11-19 ENCOUNTER — OFFICE VISIT (OUTPATIENT)
Dept: ORTHOPEDIC SURGERY | Age: 55
End: 2020-11-19
Payer: OTHER GOVERNMENT

## 2020-11-19 PROBLEM — G56.02 CARPAL TUNNEL SYNDROME ON LEFT: Status: ACTIVE | Noted: 2020-11-19

## 2020-11-19 PROCEDURE — 99213 OFFICE O/P EST LOW 20 MIN: CPT | Performed by: ORTHOPAEDIC SURGERY

## 2020-11-19 NOTE — PROGRESS NOTES
Allergies   Allergen Reactions    Dye [Iodides]     Iodine Swelling    Penicillins Rash       Meds    Current Outpatient Medications   Medication Sig Dispense Refill    losartan (COZAAR) 100 MG tablet Take 1 tablet by mouth daily 30 tablet 11    metFORMIN (GLUCOPHAGE) 500 MG tablet 1 tab tid 90 tablet 11    nabumetone (RELAFEN) 500 MG tablet TAKE ONE TABLET BY MOUTH TWICE A DAY 60 tablet 1    meclizine (ANTIVERT) 25 MG tablet TAKE ONE TABLET BY MOUTH ONE HOUR BEFORE TRAVEL, REPEAT EVERY 12 TO 24 HOURS IF NEEDED 30 tablet 0    promethazine (PHENERGAN) 25 MG tablet TAKE ONE TABLET BY MOUTH EVERY 6 HOURS AS NEEDED FOR VERTIGO OR NAUSEA 20 tablet 0    folic acid (FOLVITE) 1 MG tablet Take 1 mg by mouth daily      CLOMIPHENE CITRATE PO Take by mouth From Dr. Shadi Solis (FISH OIL PO) Take 3 tablets by mouth daily      methocarbamol (ROBAXIN-750) 750 MG tablet Take 1 tablet by mouth 3 times daily 90 tablet 3    tadalafil (CIALIS) 5 MG tablet Take 5 mg by mouth as needed for Erectile Dysfunction.  traMADol (ULTRAM) 50 MG tablet Take 50 mg by mouth every 6 hours as needed.  aspirin 81 MG tablet Take 81 mg by mouth daily. No current facility-administered medications for this visit.         Social    Social History     Socioeconomic History    Marital status: Single     Spouse name: Not on file    Number of children: Not on file    Years of education: Not on file    Highest education level: Not on file   Occupational History    Not on file   Social Needs    Financial resource strain: Not on file    Food insecurity     Worry: Not on file     Inability: Not on file    Transportation needs     Medical: Not on file     Non-medical: Not on file   Tobacco Use    Smoking status: Former Smoker     Packs/day: 1.00     Years: 6.00     Pack years: 6.00     Types: Cigarettes     Last attempt to quit: 1999     Years since quittin.8    Smokeless tobacco: Never Used  Tobacco comment: Patient does not know how many packs per day, he has not smoked in ovver 20 years   Substance and Sexual Activity    Alcohol use: Yes     Comment: rarely    Drug use: No    Sexual activity: Yes     Partners: Female   Lifestyle    Physical activity     Days per week: Not on file     Minutes per session: Not on file    Stress: Not on file   Relationships    Social connections     Talks on phone: Not on file     Gets together: Not on file     Attends Bahai service: Not on file     Active member of club or organization: Not on file     Attends meetings of clubs or organizations: Not on file     Relationship status: Not on file    Intimate partner violence     Fear of current or ex partner: Not on file     Emotionally abused: Not on file     Physically abused: Not on file     Forced sexual activity: Not on file   Other Topics Concern    Not on file   Social History Narrative    ** Merged History Encounter **            Family HISTORY    Family History   Problem Relation Age of Onset    Early Death Father     Heart Disease Father     Cancer Maternal Aunt     Cancer Maternal Grandmother        PHYSICAL EXAM    Vital Signs: There were no vitals taken for this visit. General Appearance:  Normal body habitus. Alert and oriented to person, place, and time. Affect:  Normal.   Gait:  Antalgic due to recent left knee surgery. Reflexes:  Intact.    Pulses:  Normal.   Skin:  Normal.     Wrist Exam  Hand dominance - Right  Surface Exam - no cutaneous lesions are seen      Right Hand Exam:      Neurologic Exam:    Reflexes:  Normal  Phalens:  Negative in the right but positive in the left  Tinels:  Negative, but he does have pain to compression and soreness with repetitive use in the left wrist.  Median Nerve Compression:  Negative  Thenar strength:  Normal in the right and diminished in the left  Thenar atrophy:  None noted on either side  2 PD:  Normal in the right but somewhat diminished in the left  Elbow Flexion Test:  Negative . Finkelstein's test, but has some pain in the base of thumb pressure  Wrist Motion Right Left   DF     PF     RD     CMC     UD     SUP     PRO       NCV / EMG STUDIES    None    IMAGING STUDIES    None    IMPRESSION    Right hand pain status post carpal tunnel release    PLAN    1. Conservative care options including physical therapy, NSAIDs, bracing, and activity modification were discussed. 2.  The indications for therapeutic injections were discussed. 3.  The indications for additional imaging studies were discussed. 4.  After considering the various options discussed, the patient elected to pursue a course that includes requesting cortisone injection into the right carpal tunnel. I will see her right away to request the diagnosis of left carpal tunnel syndrome to be applied to his claim as a diagnosis that has arisen through compensation and flow-through. Since I have not heard about any of the recommendations above from the last visit, I would recommend proceeding with the same recommendations.

## 2020-11-20 ENCOUNTER — OFFICE VISIT (OUTPATIENT)
Dept: ORTHOPEDIC SURGERY | Age: 55
End: 2020-11-20
Payer: OTHER GOVERNMENT

## 2020-11-20 VITALS — WEIGHT: 291 LBS | BODY MASS INDEX: 38.57 KG/M2 | HEIGHT: 73 IN

## 2020-11-20 PROCEDURE — 99213 OFFICE O/P EST LOW 20 MIN: CPT | Performed by: PHYSICAL MEDICINE & REHABILITATION

## 2020-11-20 NOTE — PROGRESS NOTES
appropriate   · Lymphatic: The lymphatic examination bilaterally reveals all areas to be without enlargement or induration  · Sensation: Sensation is intact without deficit  · Coordination/Balance: Good coordination     CERVICAL EXAMINATION:  · Inspection: Local inspection shows no step-off or bruising. · Palpation: No evidence of tenderness at the midline. .    · Range of Motion: Mild loss of flexion and extension, moderate loss of rotation to left and right. Overall stable  · Strength: 5/5 bilateral upper extremities   · Special Tests:    ·   Spurling's, L'Hermitte's & Haque's negative bilaterally. .      · Skin:There are no rashes, ulcerations or lesions in right & left upper extremities. · Reflexes: Bilaterally triceps, biceps and brachioradialis are trace. Clonus absent bilaterally at the feet. · Gait & station: Normal gait    · Additional Examinations:  ·  RIGHT UPPER EXTREMITY:  Inspection/examination of the right upper extremity does not show any tenderness, deformity or injury. Range of motion is full. There is no gross instability. There are no rashes, ulcerations or lesions. Strength and tone are normal.  · LEFT UPPER EXTREMITY: Inspection/examination of the left upper extremity does not show any tenderness, deformity or injury. Range of motion is full. There is no gross instability. There are no rashes, ulcerations or lesions. Strength and tone are normal.    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 Is clinical status is stable  If his current restrictions do not allow him the new position that he should not do it.   He asked about fishing and if he can continue the restrictions we have in place that he is okay to do it    Cesar Pelletier MD

## 2021-02-01 RX ORDER — NABUMETONE 500 MG/1
TABLET, FILM COATED ORAL
Qty: 60 TABLET | Refills: 0 | Status: SHIPPED | OUTPATIENT
Start: 2021-02-01

## 2021-03-29 ENCOUNTER — NURSE ONLY (OUTPATIENT)
Dept: FAMILY MEDICINE CLINIC | Age: 56
End: 2021-03-29
Payer: COMMERCIAL

## 2021-03-29 DIAGNOSIS — E11.9 TYPE 2 DIABETES MELLITUS WITHOUT COMPLICATION, WITHOUT LONG-TERM CURRENT USE OF INSULIN (HCC): ICD-10-CM

## 2021-03-29 DIAGNOSIS — I10 ESSENTIAL HYPERTENSION: Primary | ICD-10-CM

## 2021-03-29 DIAGNOSIS — I10 UNCONTROLLED HYPERTENSION: ICD-10-CM

## 2021-03-29 DIAGNOSIS — E78.2 MIXED HYPERLIPIDEMIA: Primary | ICD-10-CM

## 2021-03-29 DIAGNOSIS — Z12.5 PROSTATE CANCER SCREENING: ICD-10-CM

## 2021-03-29 DIAGNOSIS — E78.2 MIXED HYPERLIPIDEMIA: ICD-10-CM

## 2021-03-29 LAB
BASOPHILS ABSOLUTE: 0.1 K/UL (ref 0–0.2)
BASOPHILS RELATIVE PERCENT: 0.6 %
CREATININE URINE: 194.8 MG/DL (ref 39–259)
EOSINOPHILS ABSOLUTE: 0.1 K/UL (ref 0–0.6)
EOSINOPHILS RELATIVE PERCENT: 0.8 %
HCT VFR BLD CALC: 49.4 % (ref 40.5–52.5)
HEMOGLOBIN: 16.3 G/DL (ref 13.5–17.5)
LYMPHOCYTES ABSOLUTE: 2.2 K/UL (ref 1–5.1)
LYMPHOCYTES RELATIVE PERCENT: 24.4 %
MCH RBC QN AUTO: 29.4 PG (ref 26–34)
MCHC RBC AUTO-ENTMCNC: 33 G/DL (ref 31–36)
MCV RBC AUTO: 89.2 FL (ref 80–100)
MICROALBUMIN UR-MCNC: <1.2 MG/DL
MICROALBUMIN/CREAT UR-RTO: NORMAL MG/G (ref 0–30)
MONOCYTES ABSOLUTE: 0.6 K/UL (ref 0–1.3)
MONOCYTES RELATIVE PERCENT: 6.5 %
NEUTROPHILS ABSOLUTE: 6.1 K/UL (ref 1.7–7.7)
NEUTROPHILS RELATIVE PERCENT: 67.7 %
PDW BLD-RTO: 14.9 % (ref 12.4–15.4)
PLATELET # BLD: 213 K/UL (ref 135–450)
PMV BLD AUTO: 9 FL (ref 5–10.5)
PROSTATE SPECIFIC ANTIGEN: 0.41 NG/ML (ref 0–4)
RBC # BLD: 5.54 M/UL (ref 4.2–5.9)
WBC # BLD: 9.1 K/UL (ref 4–11)

## 2021-03-29 PROCEDURE — 36415 COLL VENOUS BLD VENIPUNCTURE: CPT | Performed by: FAMILY MEDICINE

## 2021-03-30 DIAGNOSIS — I10 ESSENTIAL HYPERTENSION: ICD-10-CM

## 2021-03-30 DIAGNOSIS — I10 UNCONTROLLED HYPERTENSION: ICD-10-CM

## 2021-03-30 LAB
A/G RATIO: 1.7 (ref 1.1–2.2)
ALBUMIN SERPL-MCNC: 4.4 G/DL (ref 3.4–5)
ALP BLD-CCNC: 62 U/L (ref 40–129)
ALT SERPL-CCNC: 28 U/L (ref 10–40)
ANION GAP SERPL CALCULATED.3IONS-SCNC: 11 MMOL/L (ref 3–16)
AST SERPL-CCNC: 22 U/L (ref 15–37)
BILIRUB SERPL-MCNC: 0.6 MG/DL (ref 0–1)
BUN BLDV-MCNC: 18 MG/DL (ref 7–20)
CALCIUM SERPL-MCNC: 9.1 MG/DL (ref 8.3–10.6)
CHLORIDE BLD-SCNC: 101 MMOL/L (ref 99–110)
CHOLESTEROL, TOTAL: 190 MG/DL (ref 0–199)
CO2: 26 MMOL/L (ref 21–32)
CREAT SERPL-MCNC: 0.8 MG/DL (ref 0.9–1.3)
ESTIMATED AVERAGE GLUCOSE: 174.3 MG/DL
GFR AFRICAN AMERICAN: >60
GFR NON-AFRICAN AMERICAN: >60
GLOBULIN: 2.6 G/DL
GLUCOSE BLD-MCNC: 150 MG/DL (ref 70–99)
HBA1C MFR BLD: 7.7 %
HDLC SERPL-MCNC: 25 MG/DL (ref 40–60)
LDL CHOLESTEROL CALCULATED: ABNORMAL MG/DL
LDL CHOLESTEROL DIRECT: 111 MG/DL
POTASSIUM SERPL-SCNC: 4.4 MMOL/L (ref 3.5–5.1)
SODIUM BLD-SCNC: 138 MMOL/L (ref 136–145)
TOTAL PROTEIN: 7 G/DL (ref 6.4–8.2)
TRIGL SERPL-MCNC: 333 MG/DL (ref 0–150)
VLDLC SERPL CALC-MCNC: ABNORMAL MG/DL

## 2021-03-30 RX ORDER — LOSARTAN POTASSIUM 100 MG/1
TABLET ORAL
Qty: 30 TABLET | Refills: 0 | Status: SHIPPED | OUTPATIENT
Start: 2021-03-30 | End: 2021-04-30

## 2021-03-30 NOTE — TELEPHONE ENCOUNTER
Future appt scheduled 04/26/2021           Last appt 01/27/2020      Last Written 02/23/2021    losartan (COZAAR) 100 MG tablet  #30  0 Rf

## 2021-04-26 ENCOUNTER — OFFICE VISIT (OUTPATIENT)
Dept: FAMILY MEDICINE CLINIC | Age: 56
End: 2021-04-26
Payer: COMMERCIAL

## 2021-04-26 VITALS
OXYGEN SATURATION: 94 % | DIASTOLIC BLOOD PRESSURE: 84 MMHG | HEART RATE: 88 BPM | BODY MASS INDEX: 38.7 KG/M2 | WEIGHT: 292 LBS | HEIGHT: 73 IN | SYSTOLIC BLOOD PRESSURE: 124 MMHG

## 2021-04-26 DIAGNOSIS — I10 ESSENTIAL HYPERTENSION: ICD-10-CM

## 2021-04-26 DIAGNOSIS — R53.83 OTHER FATIGUE: ICD-10-CM

## 2021-04-26 DIAGNOSIS — E11.9 TYPE 2 DIABETES MELLITUS WITHOUT COMPLICATION, WITHOUT LONG-TERM CURRENT USE OF INSULIN (HCC): Primary | ICD-10-CM

## 2021-04-26 DIAGNOSIS — E78.2 MIXED HYPERLIPIDEMIA: ICD-10-CM

## 2021-04-26 DIAGNOSIS — R41.3 MEMORY LOSS: ICD-10-CM

## 2021-04-26 LAB
FOLATE: 18.74 NG/ML (ref 4.78–24.2)
T4 FREE: 1.2 NG/DL (ref 0.9–1.8)
TSH SERPL DL<=0.05 MIU/L-ACNC: 1.72 UIU/ML (ref 0.27–4.2)
VITAMIN B-12: 497 PG/ML (ref 211–911)
VITAMIN D 25-HYDROXY: 21.1 NG/ML

## 2021-04-26 PROCEDURE — 3051F HG A1C>EQUAL 7.0%<8.0%: CPT | Performed by: FAMILY MEDICINE

## 2021-04-26 PROCEDURE — 99214 OFFICE O/P EST MOD 30 MIN: CPT | Performed by: FAMILY MEDICINE

## 2021-04-26 RX ORDER — GLIMEPIRIDE 1 MG/1
1 TABLET ORAL
Qty: 30 TABLET | Refills: 2 | Status: SHIPPED | OUTPATIENT
Start: 2021-04-26 | End: 2021-07-27

## 2021-04-26 ASSESSMENT — PATIENT HEALTH QUESTIONNAIRE - PHQ9
SUM OF ALL RESPONSES TO PHQ QUESTIONS 1-9: 0
1. LITTLE INTEREST OR PLEASURE IN DOING THINGS: 0
SUM OF ALL RESPONSES TO PHQ QUESTIONS 1-9: 0

## 2021-04-26 NOTE — PROGRESS NOTES
Chief Complaint   Patient presents with    Diabetes    Hyperlipidemia    Hypertension    Other     patient has multiple medical complaints       Wt Readings from Last 3 Encounters:   21 292 lb (132.5 kg)   20 291 lb (132 kg)   20 84 lb 10.5 oz (38.4 kg)     BP Readings from Last 3 Encounters:   21 124/84   20 133/84   20 (!) 146/84      Lab Results   Component Value Date    LABA1C 7.7 2021    LABA1C 6.9 2020    LABA1C 6.3 2019     He seems to be getting very forgetful again. He thinks it is from the Metformin. He feels it is little things that he should be remembering. He has been having trouble remembering names and sometimes when he is driving he forgets where he is going. HPI:  Candice Hughes is a 61 y.o. (: 1965) here today for    Treatment Adherence:   Medication compliance:  compliant all of the time  Diet compliance:  only eats 2 meals a day and is trying to lose weight  Weight trend: stable  Current exercise: no regular exercise  Barriers: none    Diabetes Mellitus Type 2: Current symptoms/problems include none. Home blood sugar records: patient does not test  Any episodes of hypoglycemia? no  Eye exam current (within one year): no  Tobacco history: He  reports that he quit smoking about 22 years ago. His smoking use included cigarettes. He has a 6.00 pack-year smoking history. He has never used smokeless tobacco.   Daily Aspirin? Yes    Hypertension:  Home blood pressure monitoring: No.  He is not adherent to a low sodium diet. Patient denies chest pain and shortness of breath. Antihypertensive medication side effects: no medication side effects noted. Use of agents associated with hypertension: none. Hyperlipidemia:  No new myalgias or GI upset on no medication.        Lab Results   Component Value Date    LABA1C 7.7 2021    LABA1C 6.9 2020    LABA1C 6.3 2019     Lab Results   Component Value Date    LABMICR <1.20 03/29/2021    CREATININE 0.8 (L) 03/29/2021     Lab Results   Component Value Date    ALT 28 03/29/2021    AST 22 03/29/2021     Lab Results   Component Value Date    CHOL 190 03/29/2021    TRIG 333 (H) 03/29/2021    HDL 25 (L) 03/29/2021    LDLCALC see below 03/29/2021    LDLDIRECT 111 (H) 03/29/2021          [] Patient has completed an advance directive  [x] Patient has NOT completed an advanced directive  [] Patient has a documented healthcare surrogate  [x] Patient does NOT have a documented healthcare surrogate  [] Discussed the importance of establishing and updating an advanced directive. Patient has questions at this time and those were answered. [x] Discussed the importance of establishing and updating an advanced directive. Patient does NOT have questions at this time. Discussed with: [x] Patient            [] Family             [] Other caregiver    Patient's medications, allergies, past medical, surgical, social and family histories were reviewed and updated asappropriate on 4/26/2021 at 9:55 AM.    ROS:  Review of Systems    All other systems reviewed and are negative except as noted above on 4/26/2021 at 9:55 AM. Additional review of systems may be scanned into the media section ofthis medical record. Any responses requiring further intervention were pursued.     Past Medical History:   Diagnosis Date    Carpal tunnel syndrome 10/24/2013    Herniated disc     Hypertension     No history of procedure 11/04/2016    Primary localized osteoarthrosis, lower leg 10/17/2013     Family History   Problem Relation Age of Onset    Early Death Father     Heart Disease Father     Cancer Maternal Aunt     Cancer Maternal Grandmother      Social History     Socioeconomic History    Marital status: Single     Spouse name: Not on file    Number of children: Not on file    Years of education: Not on file    Highest education level: Not on file   Occupational History    Not on file   Social (PHENERGAN) 25 MG tablet TAKE ONE TABLET BY MOUTH EVERY 6 HOURS AS NEEDED FOR VERTIGO OR NAUSEA Yes Bud Avendano APRN - CNP   folic acid (FOLVITE) 1 MG tablet Take 1 mg by mouth daily Yes Historical Provider, MD   CLOMIPHENE CITRATE PO Take by mouth From Dr. Gege Villa Yes Historical Provider, MD   Omega-3 Fatty Acids (FISH OIL PO) Take 3 tablets by mouth daily Yes Historical Provider, MD   methocarbamol (ROBAXIN-750) 750 MG tablet Take 1 tablet by mouth 3 times daily Yes MK Noel MD   tadalafil (CIALIS) 5 MG tablet Take 5 mg by mouth as needed for Erectile Dysfunction. Yes Historical Provider, MD   traMADol (ULTRAM) 50 MG tablet Take 50 mg by mouth every 6 hours as needed. Yes Historical Provider, MD   aspirin 81 MG tablet Take 81 mg by mouth daily. Yes Historical Provider, MD     Allergies   Allergen Reactions    Dye [Iodides]     Iodine Swelling    Penicillins Rash       OBJECTIVE:  Estimated body mass index is 38.52 kg/m² as calculated from the following:    Height as of this encounter: 6' 1\" (1.854 m). Weight as of this encounter: 292 lb (132.5 kg). Vitals:    04/26/21 0919   BP: 124/84   Site: Left Upper Arm   Position: Sitting   Cuff Size: Medium Adult   Pulse: 88   SpO2: 94%   Weight: 292 lb (132.5 kg)   Height: 6' 1\" (1.854 m)       Physical Exam  Vitals signs and nursing note reviewed. Constitutional:       General: He is not in acute distress. Appearance: He is well-developed. He is not diaphoretic. HENT:      Head: Normocephalic and atraumatic. Right Ear: External ear normal.      Left Ear: External ear normal.      Nose: Nose normal.   Eyes:      General: Lids are normal. No scleral icterus. Right eye: No discharge. Left eye: No discharge. Pupils: Pupils are equal, round, and reactive to light. Neck:      Thyroid: No thyromegaly. Vascular: No JVD. Cardiovascular:      Rate and Rhythm: Normal rate and regular rhythm.       Heart sounds: Monocytes % 03/29/2021 6.5     Eosinophils % 03/29/2021 0.8     Basophils % 03/29/2021 0.6     Neutrophils Absolute 03/29/2021 6.1     Lymphocytes Absolute 03/29/2021 2.2     Monocytes Absolute 03/29/2021 0.6     Eosinophils Absolute 03/29/2021 0.1     Basophils Absolute 03/29/2021 0.1     PSA 03/29/2021 0.41     LDL Direct 03/29/2021 111*              ASSESSMENT PLAN      Diagnosis Orders   1. Type 2 diabetes mellitus without complication, without long-term current use of insulin (HCC)  HEMOGLOBIN A1C    glimepiride (AMARYL) 1 MG tablet   2. Memory loss  VITAMIN D 25 HYDROXY    VITAMIN B12 & FOLATE    TSH without Reflex    T4, FREE   3. Essential hypertension      benign   4. Mixed hyperlipidemia  LIPID PANEL    HEPATIC FUNCTION PANEL   5. Other fatigue  Testosterone   Sugar control has worsened. He thinks Metformin is causing his memory loss. He thought Crestor was doing it before. He thought atorvastatin did it before that. On closer questioning he thinks that his memory got a little better is now worse. States he cannot remember things that he should such as names or what has happened recently. States he has gotten lost on the road on occasion. I told him that his sugar control had worsened. We talked about percentages of cardiovascular risk and he said he did not really believe in those percentages he had a higher risk of being in a car accident and dying. I told him that actually was not true from a statistics standpoint. We did not talk about restarting a statin to lower cardiovascular risk. He thought losartan was causing chest pain at night. I explained how losartan helps protect the kidneys. On closer questioning it sounds like he has reflux at night when he lays down and has chest pain is resolved but he did not want a treatment for that. I offered to stop all medications and labs since he did not seem to feel like he was at risk for any problems from untreated disease.   He did agree however to a switch in his diabetic medicine to glimepiride 1 mg. I stressed if he did fingersticks we could titrate his medicine faster but he is not wish to do so. I stressed the need to eat regularly to avoid hypoglycemia. We will be updating his labs. He states on a life screen he had a low vitamin D level. I am not sure he will restart a statin in the future. Do lipid and hemoglobin A1c in 3 months and see him back 1 week later. I also asked him to consider what his preferences would be if lifestyle changes not helped his sugar and cholesterol further. Patient should call the office immediately with new or ongoing signs or symptoms or worsening, or proceed to the emergency room. No changes in past medical history, past surgical history, social history, or family history were noted during the patient encounter unless specifically listed above. All updates of past medical history, past surgical history, social history, or family history were reviewed personally by me during the office visit. All problems listed in the assessment are stable unless noted otherwise. Medication profile reviewed personally by me during the visit. Medication side effects and possible impairments from medications were discussed as applicable. This document was prepared by a combination of typing and transcription through a voice recognition software. Scribe attestation: Glynn Sandra MA, am scribing for and in the presence of Warden Jose MD. Electronically signed by Hermine Blizzard, MA on 4/26/2021 at 9:55 AM      Provider attestation:     I, Dr. Elier Robles, personally performed the services described in this documentation, as scribed by the above signed scribe in my presence, and it is both accurate and complete.  I agree with the ROS and Past Histories independently gathered by the clinical support staff and the remaining scribed note accurately describes my personal service to the patient.       4/26/2021    2:14 PM

## 2021-04-26 NOTE — PATIENT INSTRUCTIONS
E mail your [facility] screening results to [email or web address]. Stop Metformin and start Glimepiride 1 mg daily. Patient should call the office immediately with new or ongoing signs or symptoms or worsening, or proceed to the emergency room. If you are on medications which could impair your senses, you are at risk of weakness, falls, dizziness, or drowsiness. You should be careful during activities which could place you at risk of harm, such as climbing, using stairs, operating machinery, or driving vehicles. If you feel you cannot safely do these activities, you should request others to help you, or avoid the activities altogether. If you are drowsy for any other reason, you should use the same precautions as listed above. Web Address for Advance Directive:    Santi     Scheduling a Covid Vaccine:    Website: [employer]. coronavirus. [place].gov. [place]    Call: [phone] (2-533-8-ASK-ODH)    The medication list included in this document is our record of what you are currently taking, including any changes that were made at today's visit. If you find any differences when compared to your medications at home, or have any questions that were not answered at your visit, please contact the office.

## 2021-04-28 LAB — TESTOSTERONE TOTAL: 453 NG/DL (ref 220–1000)

## 2021-05-05 ENCOUNTER — OFFICE VISIT (OUTPATIENT)
Dept: ORTHOPEDIC SURGERY | Age: 56
End: 2021-05-05
Payer: OTHER GOVERNMENT

## 2021-05-05 VITALS — WEIGHT: 292 LBS | BODY MASS INDEX: 38.7 KG/M2 | HEIGHT: 73 IN

## 2021-05-05 DIAGNOSIS — S83.231S COMPLEX TEAR OF MEDIAL MENISCUS OF RIGHT KNEE AS CURRENT INJURY, SEQUELA: ICD-10-CM

## 2021-05-05 DIAGNOSIS — M17.11 PRIMARY OSTEOARTHRITIS OF RIGHT KNEE: Primary | ICD-10-CM

## 2021-05-05 PROCEDURE — 99213 OFFICE O/P EST LOW 20 MIN: CPT | Performed by: ORTHOPAEDIC SURGERY

## 2021-05-05 RX ORDER — NABUMETONE 500 MG/1
500 TABLET, FILM COATED ORAL 2 TIMES DAILY
Qty: 60 TABLET | Refills: 5 | Status: SHIPPED | OUTPATIENT
Start: 2021-05-05

## 2021-05-05 NOTE — PROGRESS NOTES
KNEE VISIT      HISTORY OF PRESENT ILLNESS    Ramana Salazar is a 61 y.o. male who presents for reevaluation of his right knee. He needs to have his restrictions updated. He still persistent having difficulty with carrying anything heavy for any length of time. His right knee is quite an issue as well as some other issues but his knee keeps him from the walking and standing and climbing type of activities. ROS    Well-documented in the patient medical history form dated 1/23/2020  All other ROS negative except for above.     Past Surgical history    Past Surgical History:   Procedure Laterality Date    CARPAL TUNNEL RELEASE      COLONOSCOPY  11/04/2016    normal    CYST REMOVAL      HAND SURGERY      HERNIA REPAIR      KNEE ARTHROSCOPY      KNEE ARTHROSCOPY Right 12/01/2014    KNEE ARTHROSCOPY Left 11/12/2018    KNEE VIDEO ARTHROSCOPY, MEDIAL MENISECTOMY, CHONDROPLASTY, INTERNAL FIXATION MEDIAL TIBIAL PLATEAU INSUFFICIENCY FRACTURE WITH BONE SUBSTITUTE     KNEE SURGERY      CA KNEE SCOPE, ALLOGRAFT IMPANT Left 11/12/2018    KNEE VIDEO ARTHROSCOPY, MEDIAL MENISECTOMY, CHONDROPLASTY, INTERNAL FIXATION MEDIAL TIBIAL PLATEAU INSUFFICIENCY FRACTURE WITH BONE SUBSTITUTE performed by Maxwell Leone MD at [address] [place] [address]    Past Medical History:   Diagnosis Date    Carpal tunnel syndrome 10/24/2013    Herniated disc     Hypertension     No history of procedure 11/04/2016    Primary localized osteoarthrosis, lower leg 10/17/2013       Allergies    Allergies   Allergen Reactions    Dye [Iodides]     Iodine Swelling    Penicillins Rash       Meds    Current Outpatient Medications   Medication Sig Dispense Refill    losartan (COZAAR) 100 MG tablet TAKE ONE TABLET BY MOUTH DAILY 30 tablet 5    glimepiride (AMARYL) 1 MG tablet Take 1 tablet by mouth every morning (before breakfast) 30 tablet 2    nabumetone (RELAFEN) 500 MG tablet TAKE ONE TABLET BY MOUTH TWICE A DAY 60 tablet 0    meclizine (ANTIVERT) 25 MG tablet TAKE ONE TABLET BY MOUTH ONE HOUR BEFORE TRAVEL, REPEAT EVERY 12 TO 24 HOURS IF NEEDED 30 tablet 0    promethazine (PHENERGAN) 25 MG tablet TAKE ONE TABLET BY MOUTH EVERY 6 HOURS AS NEEDED FOR VERTIGO OR NAUSEA 20 tablet 0    folic acid (FOLVITE) 1 MG tablet Take 1 mg by mouth daily      CLOMIPHENE CITRATE PO Take by mouth From Dr. Ramirez Cancel (FISH OIL PO) Take 3 tablets by mouth daily      methocarbamol (ROBAXIN-750) 750 MG tablet Take 1 tablet by mouth 3 times daily 90 tablet 3    tadalafil (CIALIS) 5 MG tablet Take 5 mg by mouth as needed for Erectile Dysfunction.  traMADol (ULTRAM) 50 MG tablet Take 50 mg by mouth every 6 hours as needed.  aspirin 81 MG tablet Take 81 mg by mouth daily. No current facility-administered medications for this visit.         Social    Social History     Socioeconomic History    Marital status: Single     Spouse name: Not on file    Number of children: Not on file    Years of education: Not on file    Highest education level: Not on file   Occupational History    Not on file   Social Needs    Financial resource strain: Not on file    Food insecurity     Worry: Not on file     Inability: Not on file    Transportation needs     Medical: Not on file     Non-medical: Not on file   Tobacco Use    Smoking status: Former Smoker     Packs/day: 1.00     Years: 6.00     Pack years: 6.00     Types: Cigarettes     Quit date: 1999     Years since quittin.2    Smokeless tobacco: Never Used    Tobacco comment: Patient does not know how many packs per day, he has not smoked in ovver 20 years   Substance and Sexual Activity    Alcohol use: Yes     Comment: rarely    Drug use: No    Sexual activity: Yes     Partners: Female   Lifestyle    Physical activity     Days per week: Not on file     Minutes per session: Not on file    Stress: Not on file   Relationships    Social connections     Talks on phone: Not on file     Gets together: Not on file     Attends [employer]ism service: Not on file     Active member of club or organization: Not on file     Attends meetings of clubs or organizations: Not on file     Relationship status: Not on file    Intimate partner violence     Fear of current or ex partner: Not on file     Emotionally abused: Not on file     Physically abused: Not on file     Forced sexual activity: Not on file   Other Topics Concern    Not on file   Social History Narrative    ** Merged History Encounter **            Family HISTORY    Family History   Problem Relation Age of Onset    Early Death Father     Heart Disease Father     Cancer Maternal Aunt     Cancer Maternal Grandmother        PHYSICAL EXAM    Vital Signs:  Ht 6' 1\" (1.854 m)   Wt 292 lb (132.5 kg)   BMI 38.52 kg/m²   General Appearance:  Normal body habitus. Alert and oriented to person, place, and time. Affect:  Normal.   Gait:  Normal. Good balance and coordination. Skin:  Intact. Sensation:  Intact. Strength:  Intact. Reflexes:  Intact. Pulses:  Intact. Knee Exam:    Effusion: Negative    Range of Motion Right Left   Extension 0 0   Flexion 115 115     Provocative Test Right Left    Positive Negative Positive Negative   Anterior drawer [] [x] [] [x]   Lachman [] [x] [] [x]   Posterior drawer [] [x] [] [x]   Varus testing [] [x] [] [x]   Valgus testing [] [x] [] [x]   Joint line tenderness [x] [] [x] []     Additional Exam Comments: His neurocirculatory lymphatic exam otherwise is normal symmetric to both lower extremities. In the right he does have generalized pain some crepitus mostly posterior laterally with range of motion. He has no gross instability in the right knee. IMAGING STUDIES    X-rays were not performed today    IMPRESSION    Right knee pain secondary to osteoarthritis and sequela of the medial meniscus tear    PLAN      1.   Conservative care options including physical therapy, NSAIDs,

## 2021-05-06 ENCOUNTER — OFFICE VISIT (OUTPATIENT)
Dept: ORTHOPEDIC SURGERY | Age: 56
End: 2021-05-06
Payer: OTHER GOVERNMENT

## 2021-05-06 VITALS — WEIGHT: 292 LBS | BODY MASS INDEX: 38.7 KG/M2 | HEIGHT: 73 IN

## 2021-05-06 DIAGNOSIS — G56.01 CARPAL TUNNEL SYNDROME ON RIGHT: Primary | ICD-10-CM

## 2021-05-06 PROCEDURE — 99213 OFFICE O/P EST LOW 20 MIN: CPT | Performed by: ORTHOPAEDIC SURGERY

## 2021-05-06 NOTE — PROGRESS NOTES
CARPAL TUNNEL VISIT        HISTORY OF PRESENT ILLNESS    Abdiel Meneses is a 61 y.o. male who presents for evaluation of his right hand. He still persistent having some residual tendinitis versus carpal tunnel symptoms. He comes with repetitive use and stress. He still has the carpal tunnel syndromes which are as bad if not worse than his right side. ROS    Well-documented in the medical history form dated 5/5/2021  All other ROS negative except for above.     Past Surgical history    Past Surgical History:   Procedure Laterality Date    CARPAL TUNNEL RELEASE      COLONOSCOPY  11/04/2016    normal    CYST REMOVAL      HAND SURGERY      HERNIA REPAIR      KNEE ARTHROSCOPY      KNEE ARTHROSCOPY Right 12/01/2014    KNEE ARTHROSCOPY Left 11/12/2018    KNEE VIDEO ARTHROSCOPY, MEDIAL MENISECTOMY, CHONDROPLASTY, INTERNAL FIXATION MEDIAL TIBIAL PLATEAU INSUFFICIENCY FRACTURE WITH BONE SUBSTITUTE     KNEE SURGERY      SC KNEE SCOPE, ALLOGRAFT IMPANT Left 11/12/2018    KNEE VIDEO ARTHROSCOPY, MEDIAL MENISECTOMY, CHONDROPLASTY, INTERNAL FIXATION MEDIAL TIBIAL PLATEAU INSUFFICIENCY FRACTURE WITH BONE SUBSTITUTE performed by Iesha Munoz MD at [facility]    Past Medical History:   Diagnosis Date    Carpal tunnel syndrome 10/24/2013    Herniated disc     Hypertension     No history of procedure 11/04/2016    Primary localized osteoarthrosis, lower leg 10/17/2013       Allergies    Allergies   Allergen Reactions    Dye [Iodides]     Iodine Swelling    Penicillins Rash       Meds    Current Outpatient Medications   Medication Sig Dispense Refill    nabumetone (RELAFEN) 500 MG tablet Take 1 tablet by mouth 2 times daily 60 tablet 5    losartan (COZAAR) 100 MG tablet TAKE ONE TABLET BY MOUTH DAILY 30 tablet 5    glimepiride (AMARYL) 1 MG tablet Take 1 tablet by mouth every morning (before breakfast) 30 tablet 2    nabumetone (RELAFEN) 500 MG tablet TAKE ONE TABLET BY MOUTH TWICE A DAY 60 tablet 0    meclizine (ANTIVERT) 25 MG tablet TAKE ONE TABLET BY MOUTH ONE HOUR BEFORE TRAVEL, REPEAT EVERY 12 TO 24 HOURS IF NEEDED 30 tablet 0    promethazine (PHENERGAN) 25 MG tablet TAKE ONE TABLET BY MOUTH EVERY 6 HOURS AS NEEDED FOR VERTIGO OR NAUSEA 20 tablet 0    folic acid (FOLVITE) 1 MG tablet Take 1 mg by mouth daily      CLOMIPHENE CITRATE PO Take by mouth From Dr. José Antonio Somers (FISH OIL PO) Take 3 tablets by mouth daily      methocarbamol (ROBAXIN-750) 750 MG tablet Take 1 tablet by mouth 3 times daily 90 tablet 3    tadalafil (CIALIS) 5 MG tablet Take 5 mg by mouth as needed for Erectile Dysfunction.  traMADol (ULTRAM) 50 MG tablet Take 50 mg by mouth every 6 hours as needed.  aspirin 81 MG tablet Take 81 mg by mouth daily. No current facility-administered medications for this visit.         Social    Social History     Socioeconomic History    Marital status: Single     Spouse name: Not on file    Number of children: Not on file    Years of education: Not on file    Highest education level: Not on file   Occupational History    Not on file   Social Needs    Financial resource strain: Not on file    Food insecurity     Worry: Not on file     Inability: Not on file    Transportation needs     Medical: Not on file     Non-medical: Not on file   Tobacco Use    Smoking status: Former Smoker     Packs/day: 1.00     Years: 6.00     Pack years: 6.00     Types: Cigarettes     Quit date: 1999     Years since quittin.2    Smokeless tobacco: Never Used    Tobacco comment: Patient does not know how many packs per day, he has not smoked in ovver 20 years   Substance and Sexual Activity    Alcohol use: Yes     Comment: rarely    Drug use: No    Sexual activity: Yes     Partners: Female   Lifestyle    Physical activity     Days per week: Not on file     Minutes per session: Not on file    Stress: Not on file   Relationships    Social connections NSAIDs, bracing, and activity modification were discussed. 2.  The indications for therapeutic injections were discussed. 3.  The indications for additional imaging studies were discussed. 4.  After considering the various options discussed, the patient elected to pursue a course that includes requesting cortisone injection into the right carpal tunnel. I would again like to request authorization for a right wrist cortisone injection to help alleviate the inflammation its in that location. Additionally because of the continued symptoms in his left hand consistent with carpal tunnel syndrome, like to amend his claim to include carpal tunnel syndrome of the left hand based upon natural progression and compensation because of the issues involving his right hand.

## 2021-07-15 ENCOUNTER — TELEPHONE (OUTPATIENT)
Dept: ORTHOPEDIC SURGERY | Age: 56
End: 2021-07-15

## 2021-07-16 ENCOUNTER — TELEPHONE (OUTPATIENT)
Dept: ORTHOPEDIC SURGERY | Age: 56
End: 2021-07-16

## 2021-07-26 ENCOUNTER — NURSE ONLY (OUTPATIENT)
Dept: FAMILY MEDICINE CLINIC | Age: 56
End: 2021-07-26

## 2021-07-26 DIAGNOSIS — Z20.822 EXPOSURE TO COVID-19 VIRUS: Primary | ICD-10-CM

## 2021-07-26 DIAGNOSIS — E78.2 MIXED HYPERLIPIDEMIA: ICD-10-CM

## 2021-07-26 DIAGNOSIS — E11.9 TYPE 2 DIABETES MELLITUS WITHOUT COMPLICATION, WITHOUT LONG-TERM CURRENT USE OF INSULIN (HCC): ICD-10-CM

## 2021-07-26 LAB
ALBUMIN SERPL-MCNC: 4.6 G/DL (ref 3.4–5)
ALP BLD-CCNC: 58 U/L (ref 40–129)
ALT SERPL-CCNC: 22 U/L (ref 10–40)
AST SERPL-CCNC: 17 U/L (ref 15–37)
BILIRUB SERPL-MCNC: 0.5 MG/DL (ref 0–1)
BILIRUBIN DIRECT: <0.2 MG/DL (ref 0–0.3)
BILIRUBIN, INDIRECT: NORMAL MG/DL (ref 0–1)
CHOLESTEROL, TOTAL: 169 MG/DL (ref 0–199)
HDLC SERPL-MCNC: 33 MG/DL (ref 40–60)
LDL CHOLESTEROL CALCULATED: 96 MG/DL
SARS-COV-2 ANTIBODY, TOTAL: NEGATIVE
TOTAL PROTEIN: 7.1 G/DL (ref 6.4–8.2)
TRIGL SERPL-MCNC: 198 MG/DL (ref 0–150)
VLDLC SERPL CALC-MCNC: 40 MG/DL

## 2021-07-26 NOTE — PROGRESS NOTES
Patient requested a PSA and a testosterone. The patient just had these done in the spring. The insurance company will not cover these.

## 2021-07-27 LAB
ESTIMATED AVERAGE GLUCOSE: 154.2 MG/DL
HBA1C MFR BLD: 7 %

## 2021-07-28 ENCOUNTER — TELEPHONE (OUTPATIENT)
Dept: FAMILY MEDICINE CLINIC | Age: 56
End: 2021-07-28

## 2021-07-28 ENCOUNTER — TELEPHONE (OUTPATIENT)
Dept: ORTHOPEDIC SURGERY | Age: 56
End: 2021-07-28

## 2021-07-28 NOTE — TELEPHONE ENCOUNTER
CRISTA called patient to make follow up appt to request Wrist Cortisone Injection but patient chose to make follow up visit for his RT Knee instead. Please confirm with patient he wants Wrist Injection and Let CRISTA POOL know.   FYI- a Wrist injection was requested 2019 with [facility] and DENIED

## 2021-07-28 NOTE — TELEPHONE ENCOUNTER
Pt called stating that he's now considered disabled by [employer]. Pt wanting to know if PCP would be able to write letter for handicap placard. Pt states he can bring in the documents for proof if needed. Pt would like to  letter.  Call back [phone]

## 2021-07-29 NOTE — TELEPHONE ENCOUNTER
Per Dr. Yajaira Dash note dated 05/06/2021    COPIED AND PASTED FROM CHART NOTE:    4. After considering the various options discussed, the patient elected to pursue a course that includes requesting cortisone injection into the right carpal tunnel. I would again like to request authorization for a right wrist cortisone injection to help alleviate the inflammation its in that location. Additionally because of the continued symptoms in his left hand consistent with carpal tunnel syndrome, like to amend his claim to include carpal tunnel syndrome of the left hand based upon natural progression and compensation because of the issues involving his right hand.

## 2021-08-11 ENCOUNTER — OFFICE VISIT (OUTPATIENT)
Dept: ORTHOPEDIC SURGERY | Age: 56
End: 2021-08-11
Payer: OTHER GOVERNMENT

## 2021-08-11 VITALS — HEIGHT: 73 IN | WEIGHT: 292 LBS | BODY MASS INDEX: 38.7 KG/M2

## 2021-08-11 DIAGNOSIS — M17.11 PRIMARY OSTEOARTHRITIS OF RIGHT KNEE: Primary | ICD-10-CM

## 2021-08-11 PROCEDURE — 99212 OFFICE O/P EST SF 10 MIN: CPT | Performed by: ORTHOPAEDIC SURGERY

## 2021-08-11 NOTE — PROGRESS NOTES
He returns today for evaluation of his right knee. He still persistent having pain consistent with arthritic degeneration within the knee. I told him at this time would recommend requesting viscosupplementation with Supartz for his right knee. On exam he has no effusion good range of motion some crepitus in the medial and patellofemoral compartments of the knee and some mild laxity consistent with pseudoligamentous laxity from arthritis.

## 2021-08-25 ENCOUNTER — OFFICE VISIT (OUTPATIENT)
Dept: ORTHOPEDIC SURGERY | Age: 56
End: 2021-08-25
Payer: OTHER GOVERNMENT

## 2021-08-25 VITALS — BODY MASS INDEX: 38.7 KG/M2 | WEIGHT: 292 LBS | HEIGHT: 73 IN

## 2021-08-25 DIAGNOSIS — M17.11 PRIMARY OSTEOARTHRITIS OF RIGHT KNEE: Primary | ICD-10-CM

## 2021-08-25 PROCEDURE — 20610 DRAIN/INJ JOINT/BURSA W/O US: CPT | Performed by: ORTHOPAEDIC SURGERY

## 2021-08-25 NOTE — PROGRESS NOTES
Recommendation is for viscosupplementation using Supartz. The right knee was injected with 2 ml of Supartz from an anterolateral joint line approach using a 25-gauge needle under sterile Betadine prep, using ethyl chloride as a topical refrigerant, for a diagnosis of osteoarthritis. The patient appeared to tolerate it well. The patient should return here each week for the next four weeks also. Encounter Diagnosis   Name Primary?     Primary osteoarthritis of right knee Yes      Orders Placed This Encounter   Procedures    TX ARTHROCENTESIS ASPIR&/INJ MAJOR JT/BURSA W/O US

## 2021-09-01 ENCOUNTER — OFFICE VISIT (OUTPATIENT)
Dept: ORTHOPEDIC SURGERY | Age: 56
End: 2021-09-01
Payer: OTHER GOVERNMENT

## 2021-09-01 VITALS — BODY MASS INDEX: 38.7 KG/M2 | HEIGHT: 73 IN | WEIGHT: 292 LBS

## 2021-09-01 DIAGNOSIS — M17.11 PRIMARY OSTEOARTHRITIS OF RIGHT KNEE: Primary | ICD-10-CM

## 2021-09-01 PROCEDURE — 20610 DRAIN/INJ JOINT/BURSA W/O US: CPT | Performed by: ORTHOPAEDIC SURGERY

## 2021-09-01 RX ORDER — BUPIVACAINE HYDROCHLORIDE 2.5 MG/ML
4 INJECTION, SOLUTION INFILTRATION; PERINEURAL ONCE
Status: COMPLETED | OUTPATIENT
Start: 2021-09-01 | End: 2021-09-01

## 2021-09-01 RX ORDER — METHYLPREDNISOLONE ACETATE 40 MG/ML
40 INJECTION, SUSPENSION INTRA-ARTICULAR; INTRALESIONAL; INTRAMUSCULAR; SOFT TISSUE ONCE
Status: SHIPPED | OUTPATIENT
Start: 2021-09-01

## 2021-09-01 RX ADMIN — BUPIVACAINE HYDROCHLORIDE 10 MG: 2.5 INJECTION, SOLUTION INFILTRATION; PERINEURAL at 09:01

## 2021-09-15 ENCOUNTER — OFFICE VISIT (OUTPATIENT)
Dept: ORTHOPEDIC SURGERY | Age: 56
End: 2021-09-15
Payer: OTHER GOVERNMENT

## 2021-09-15 VITALS — WEIGHT: 292 LBS | HEIGHT: 75 IN | BODY MASS INDEX: 36.31 KG/M2

## 2021-09-15 DIAGNOSIS — M17.11 PRIMARY OSTEOARTHRITIS OF RIGHT KNEE: Primary | ICD-10-CM

## 2021-09-15 PROCEDURE — 20610 DRAIN/INJ JOINT/BURSA W/O US: CPT | Performed by: ORTHOPAEDIC SURGERY

## 2021-09-15 NOTE — PROGRESS NOTES
HISTORY OF PRESENT ILLNESS: Patient returns today for their third out of a series of five Supartz injections done to the right knee that was performed  under a sterile Betadine prep, using ethyl chloride as a topical refrigerant, for a diagnosis of osteoarthritis. The injection of 2 ml of Supartz was done from an anterolateral joint line approach using a 25-gauge needle. It was done without incident and was tolerated well. PLAN: The patient should return next week to obtain the fourth out of a series of five injections of Supartz. No diagnosis found. No orders of the defined types were placed in this encounter.

## 2021-09-15 NOTE — LETTER
[address] [address]  Phone: [phone]  Fax: [phone]    Josselin Morrell MD        September 15, 2021     Patient: Marv Donahue   YOB: 1965   Date of Visit: 9/15/2021       To Whom it May Concern:    Nayla Ross was seen in my clinic on 9/15/2021. If you have any questions or concerns, please don't hesitate to call. Sincerely,           Keo Dent.  MD Josselin Breen MD

## 2021-09-20 ENCOUNTER — TELEPHONE (OUTPATIENT)
Dept: ORTHOPEDIC SURGERY | Age: 56
End: 2021-09-20

## 2021-09-20 NOTE — TELEPHONE ENCOUNTER
Patient would like a call from Cyndee Walter about Dr. Alex Blas filling out Disability Forms. He may be reached @ [phone]. : Yes

## 2021-09-29 ENCOUNTER — OFFICE VISIT (OUTPATIENT)
Dept: ORTHOPEDIC SURGERY | Age: 56
End: 2021-09-29
Payer: OTHER GOVERNMENT

## 2021-09-29 VITALS — WEIGHT: 292 LBS | BODY MASS INDEX: 36.31 KG/M2 | HEIGHT: 75 IN

## 2021-09-29 DIAGNOSIS — M17.11 PRIMARY OSTEOARTHRITIS OF RIGHT KNEE: Primary | ICD-10-CM

## 2021-09-29 PROCEDURE — 20610 DRAIN/INJ JOINT/BURSA W/O US: CPT | Performed by: ORTHOPAEDIC SURGERY

## 2021-09-29 NOTE — PROGRESS NOTES
HISTORY OF PRESENT ILLNESS: Patient returns today for their fourth out of a series of five Supartz injections done to the right knee that was performed  under a sterile Betadine prep, using ethyl chloride as a topical refrigerant, for a diagnosis of osteoarthritis. The injection of 2 ml of Supartz was done from an anterolateral joint line approach using a 25-gauge needle. It was done without incident and was tolerated well. PLAN: The patient should return next week to continue those injections. Encounter Diagnosis   Name Primary?  Primary osteoarthritis of right knee Yes     No orders of the defined types were placed in this encounter.

## 2021-09-29 NOTE — LETTER
[address] [address]  Phone: [phone]  Fax: [phone]    Natasha Kiran MD        September 29, 2021     Patient: May Don   YOB: 1965   Date of Visit: 9/29/2021       To Whom it May Concern:    Lukasz Herrera was seen in my clinic on 9/29/2021. If you have any questions or concerns, please don't hesitate to call. Sincerely,           Anh Balderas.  MD Natasha Conde MD

## 2021-10-06 ENCOUNTER — OFFICE VISIT (OUTPATIENT)
Dept: ORTHOPEDIC SURGERY | Age: 56
End: 2021-10-06
Payer: OTHER GOVERNMENT

## 2021-10-06 VITALS — BODY MASS INDEX: 36.31 KG/M2 | HEIGHT: 75 IN | WEIGHT: 292 LBS

## 2021-10-06 DIAGNOSIS — M17.11 PRIMARY OSTEOARTHRITIS OF RIGHT KNEE: Primary | ICD-10-CM

## 2021-10-06 PROCEDURE — 20610 DRAIN/INJ JOINT/BURSA W/O US: CPT | Performed by: ORTHOPAEDIC SURGERY

## 2021-10-06 NOTE — PROGRESS NOTES
HISTORY OF PRESENT ILLNESS: Patient returns today for their fifth out of a series of five Supartz injections done to the right knee that was performed under a sterile Betadine prep, using ethyl chloride as topical refrigerant, for a diagnosis of osteoarthritis. The injection of 2 ml of Supartz was done from an anterolateral joint line approach using a 25-gauge needle. It was done without incident and was tolerated well. PLAN: The patient should return in two months for followup if needed. Encounter Diagnosis   Name Primary?  Primary osteoarthritis of right knee Yes      No orders of the defined types were placed in this encounter.

## 2021-10-06 NOTE — LETTER
[address] [address]  Phone: [phone]  Fax: [phone]    Sheree Robles MD        October 6, 2021     Patient: Henretta Holter   YOB: 1965   Date of Visit: 10/6/2021       To Whom it May Concern:    Fitz Ron was seen in my clinic on 10/6/2021. If you have any questions or concerns, please don't hesitate to call. Sincerely,           Layal Alvarez.  MD Sheree Fowler MD

## 2021-10-28 DIAGNOSIS — I10 ESSENTIAL HYPERTENSION: ICD-10-CM

## 2021-10-28 DIAGNOSIS — I10 UNCONTROLLED HYPERTENSION: ICD-10-CM

## 2021-10-28 RX ORDER — LOSARTAN POTASSIUM 100 MG/1
TABLET ORAL
Qty: 30 TABLET | Refills: 5 | Status: SHIPPED | OUTPATIENT
Start: 2021-10-28 | End: 2022-01-24

## 2021-12-01 ENCOUNTER — OFFICE VISIT (OUTPATIENT)
Dept: ORTHOPEDIC SURGERY | Age: 56
End: 2021-12-01
Payer: OTHER GOVERNMENT

## 2021-12-01 VITALS — HEIGHT: 75 IN | BODY MASS INDEX: 36.31 KG/M2 | WEIGHT: 292 LBS

## 2021-12-01 DIAGNOSIS — M17.11 PRIMARY OSTEOARTHRITIS OF RIGHT KNEE: Primary | ICD-10-CM

## 2021-12-01 PROCEDURE — 99213 OFFICE O/P EST LOW 20 MIN: CPT | Performed by: ORTHOPAEDIC SURGERY

## 2021-12-01 NOTE — PROGRESS NOTES
nabumetone (RELAFEN) 500 MG tablet TAKE ONE TABLET BY MOUTH TWICE A DAY 60 tablet 0    meclizine (ANTIVERT) 25 MG tablet TAKE ONE TABLET BY MOUTH ONE HOUR BEFORE TRAVEL, REPEAT EVERY 12 TO 24 HOURS IF NEEDED 30 tablet 0    promethazine (PHENERGAN) 25 MG tablet TAKE ONE TABLET BY MOUTH EVERY 6 HOURS AS NEEDED FOR VERTIGO OR NAUSEA 20 tablet 0    folic acid (FOLVITE) 1 MG tablet Take 1 mg by mouth daily      CLOMIPHENE CITRATE PO Take by mouth From Dr. Samson Curtis (FISH OIL PO) Take 3 tablets by mouth daily      methocarbamol (ROBAXIN-750) 750 MG tablet Take 1 tablet by mouth 3 times daily 90 tablet 3    tadalafil (CIALIS) 5 MG tablet Take 5 mg by mouth as needed for Erectile Dysfunction.  traMADol (ULTRAM) 50 MG tablet Take 50 mg by mouth every 6 hours as needed.  aspirin 81 MG tablet Take 81 mg by mouth daily.        Current Facility-Administered Medications   Medication Dose Route Frequency Provider Last Rate Last Admin    methylPREDNISolone acetate (DEPO-MEDROL) injection 40 mg  40 mg Intra-artICUlar Once Keny Bravo MD           Social    Social History     Socioeconomic History    Marital status: Single     Spouse name: Not on file    Number of children: Not on file    Years of education: Not on file    Highest education level: Not on file   Occupational History    Not on file   Tobacco Use    Smoking status: Former Smoker     Packs/day: 1.00     Years: 6.00     Pack years: 6.00     Types: Cigarettes     Quit date: 1999     Years since quittin.8    Smokeless tobacco: Never Used    Tobacco comment: Patient does not know how many packs per day, he has not smoked in ovver 20 years   Vaping Use    Vaping Use: Never used   Substance and Sexual Activity    Alcohol use: Yes     Comment: rarely    Drug use: No    Sexual activity: Yes     Partners: Female   Other Topics Concern    Not on file   Social History Narrative    ** Merged History Encounter ** Social Determinants of Health     Financial Resource Strain:     Difficulty of Paying Living Expenses: Not on file   Food Insecurity:     Worried About Running Out of Food in the Last Year: Not on file    Pato of Food in the Last Year: Not on file   Transportation Needs:     Lack of Transportation (Medical): Not on file    Lack of Transportation (Non-Medical): Not on file   Physical Activity:     Days of Exercise per Week: Not on file    Minutes of Exercise per Session: Not on file   Stress:     Feeling of Stress : Not on file   Social Connections:     Frequency of Communication with Friends and Family: Not on file    Frequency of Social Gatherings with Friends and Family: Not on file    Attends Lutheran Services: Not on file    Active Member of [address] [place] [address] [employer] or Organizations: Not on file    Attends Club or Organization Meetings: Not on file    Marital Status: Not on file   Intimate Partner Violence:     Fear of Current or Ex-Partner: Not on file    Emotionally Abused: Not on file    Physically Abused: Not on file    Sexually Abused: Not on file   Housing Stability:     Unable to Pay for Housing in the Last Year: Not on file    Number of [place] in the Last Year: Not on file    Unstable Housing in the Last Year: Not on file       Family HISTORY    Family History   Problem Relation Age of Onset    Early Death Father     Heart Disease Father     Cancer Maternal Aunt     Cancer Maternal Grandmother        PHYSICAL EXAM    Vital Signs:  Ht 6' 3\" (1.905 m)   Wt 292 lb (132.5 kg)   BMI 36.50 kg/m²   General Appearance:  Normal body habitus. Alert and oriented to person, place, and time. Affect:  Normal.   Gait:  Normal. Good balance and coordination. Skin:  Intact. Sensation:  Intact. Strength:  Intact. Reflexes:  Intact. Pulses:  Intact.    Knee Exam:    Effusion: Negative    Range of Motion Right Left   Extension 0 0   Flexion 115 115     Provocative Test Right Left Positive Negative Positive Negative   Anterior drawer [] [x] [] [x]   Lachman [] [x] [] [x]   Posterior drawer [] [x] [] [x]   Varus testing [] [x] [] [x]   Valgus testing [] [x] [] [x]   Joint line tenderness [x] [] [x] []     Additional Exam Comments: His neurocirculatory lymphatic exam otherwise is normal symmetric to both lower extremities. In the right he does have generalized pain some crepitus mostly posterior laterally with range of motion. He has no gross instability in the right knee. IMAGING STUDIES    X-rays were not performed today    IMPRESSION    Right knee pain secondary to osteoarthritis and sequela of the medial meniscus tear    PLAN      1. Conservative care options including physical therapy, NSAIDs, bracing, and activity modification were discussed. 2.  The indications for therapeutic injections were discussed. 3.  The indications for additional imaging studies were discussed. 4.  After considering the various options discussed, the patient elected to pursue a course that includes periodic evaluation and maintain restrictions as I signed on his form today. Due to the nature of his condition he should not be doing anything different than he has been doing for the last several years. . I have discussed with him that he would likely benefit from viscosupplementation and cortisone injection to his right knee periodically. Ale Lucero

## 2021-12-02 ENCOUNTER — OFFICE VISIT (OUTPATIENT)
Dept: ORTHOPEDIC SURGERY | Age: 56
End: 2021-12-02
Payer: OTHER GOVERNMENT

## 2021-12-02 VITALS — HEIGHT: 75 IN | WEIGHT: 292 LBS | BODY MASS INDEX: 36.31 KG/M2

## 2021-12-02 DIAGNOSIS — G56.01 CARPAL TUNNEL SYNDROME ON RIGHT: Primary | ICD-10-CM

## 2021-12-02 DIAGNOSIS — G56.02 CARPAL TUNNEL SYNDROME ON LEFT: ICD-10-CM

## 2021-12-02 PROCEDURE — 99213 OFFICE O/P EST LOW 20 MIN: CPT | Performed by: ORTHOPAEDIC SURGERY

## 2021-12-02 NOTE — PROGRESS NOTES
CARPAL TUNNEL VISIT        HISTORY OF PRESENT ILLNESS    Josephus Kocher is a 61 y.o. male who presents for evaluation of his wrist issues. He is status post right carpal tunnel release but still has issues in the left side. He still has achiness and pain in the right side and into his thumb but generally is doing okay. He does have the numbness and altered sensation in left hand. ROS    Well-documented in the medical history form dated 5/5/2021  All other ROS negative except for above.     Past Surgical history    Past Surgical History:   Procedure Laterality Date    CARPAL TUNNEL RELEASE      COLONOSCOPY  11/04/2016    normal    CYST REMOVAL      HAND SURGERY      HERNIA REPAIR      KNEE ARTHROSCOPY      KNEE ARTHROSCOPY Right 12/01/2014    KNEE ARTHROSCOPY Left 11/12/2018    KNEE VIDEO ARTHROSCOPY, MEDIAL MENISECTOMY, CHONDROPLASTY, INTERNAL FIXATION MEDIAL TIBIAL PLATEAU INSUFFICIENCY FRACTURE WITH BONE SUBSTITUTE     KNEE SURGERY      NE KNEE SCOPE, ALLOGRAFT IMPANT Left 11/12/2018    KNEE VIDEO ARTHROSCOPY, MEDIAL MENISECTOMY, CHONDROPLASTY, INTERNAL FIXATION MEDIAL TIBIAL PLATEAU INSUFFICIENCY FRACTURE WITH BONE SUBSTITUTE performed by Sita Butcher MD at [address] [place] [address]    Past Medical History:   Diagnosis Date    Carpal tunnel syndrome 10/24/2013    Herniated disc     Hypertension     No history of procedure 11/04/2016    Primary localized osteoarthrosis, lower leg 10/17/2013       Allergies    Allergies   Allergen Reactions    Dye [Iodides]     Iodine Swelling    Penicillins Rash       Meds    Current Outpatient Medications   Medication Sig Dispense Refill    losartan (COZAAR) 100 MG tablet TAKE ONE TABLET BY MOUTH DAILY 30 tablet 5    glimepiride (AMARYL) 1 MG tablet TAKE ONE TABLET BY MOUTH EVERY MORNING BEFORE BREAKFAST 30 tablet 5    nabumetone (RELAFEN) 500 MG tablet Take 1 tablet by mouth 2 times daily 60 tablet 5    nabumetone (RELAFEN) 500 MG tablet TAKE Financial Resource Strain:     Difficulty of Paying Living Expenses: Not on file   Food Insecurity:     Worried About Running Out of Food in the Last Year: Not on file    Pato of Food in the Last Year: Not on file   Transportation Needs:     Lack of Transportation (Medical): Not on file    Lack of Transportation (Non-Medical): Not on file   Physical Activity:     Days of Exercise per Week: Not on file    Minutes of Exercise per Session: Not on file   Stress:     Feeling of Stress : Not on file   Social Connections:     Frequency of Communication with Friends and Family: Not on file    Frequency of Social Gatherings with Friends and Family: Not on file    Attends Sikh Services: Not on file    Active Member of [address] [place] [address] [employer] or Organizations: Not on file    Attends Club or Organization Meetings: Not on file    Marital Status: Not on file   Intimate Partner Violence:     Fear of Current or Ex-Partner: Not on file    Emotionally Abused: Not on file    Physically Abused: Not on file    Sexually Abused: Not on file   Housing Stability:     Unable to Pay for Housing in the Last Year: Not on file    Number of [place] in the Last Year: Not on file    Unstable Housing in the Last Year: Not on file       Family HISTORY    Family History   Problem Relation Age of Onset    Early Death Father     Heart Disease Father     Cancer Maternal Aunt     Cancer Maternal Grandmother        PHYSICAL EXAM    Vital Signs:  Ht 6' 3\" (1.905 m)   Wt 292 lb (132.5 kg)   BMI 36.50 kg/m²   General Appearance:  Normal body habitus. Alert and oriented to person, place, and time. Affect:  Normal.   Gait:  Antalgic due to recent left knee surgery. Reflexes:  Intact.    Pulses:  Normal.   Skin:  Normal.     Wrist Exam  Hand dominance - Right  Surface Exam - no cutaneous lesions are seen      Right Hand Exam:      Neurologic Exam:    Reflexes:  Normal  Phalens:  Negative in the right but positive in the left  Tinels: Negative, but he does have pain to compression and soreness with repetitive use in the left wrist.  Median Nerve Compression:  Negative  Thenar strength:  Normal in the right and diminished in the left  Thenar atrophy:  None noted on either side  2 PD:  Normal in the right but somewhat diminished in the left  Elbow Flexion Test:  Negative . Finkelstein's test, but has some pain in the base of thumb pressure  Wrist Motion Right Left   DF     PF     RD     CMC     UD     SUP     PRO       NCV / EMG STUDIES    None    IMAGING STUDIES    None    IMPRESSION    Right hand pain status post carpal tunnel release    PLAN    1. Conservative care options including physical therapy, NSAIDs, bracing, and activity modification were discussed. 2.  The indications for therapeutic injections were discussed. 3.  The indications for additional imaging studies were discussed. 4.  After considering the various options discussed, the patient elected to pursue a course that includes requesting cortisone injection into the right carpal tunnel. I would again like to request authorization for a right wrist cortisone injection to help alleviate the inflammation its in that location. Additionally because of the continued symptoms in his left hand consistent with carpal tunnel syndrome, like to amend his claim to include carpal tunnel syndrome of the left hand based upon natural progression and compensation because of the issues involving his right hand, and obtain an EMG nerve conduction velocity to left upper extremity. Ale Ingram

## 2021-12-10 ENCOUNTER — TELEPHONE (OUTPATIENT)
Dept: ORTHOPEDIC SURGERY | Age: 56
End: 2021-12-10

## 2021-12-10 NOTE — TELEPHONE ENCOUNTER
CALLED PATIENT AND EXPLAINED TO HIM WC REQUIRES IW TO GET TESTING LOCATION FOR EMG APPROVED FOR THE PROVIDER WHO IS PERFORMING THE TEST   PATIENT UNDERSTANDS AND IS REQUESTING A PROOF OF EMG REQUESTING MESSAGE BE SENT IN THE MAIL GOING OUT TODAY TO PATIENTS HOME   MUST BE APPROVED BY TESTING LOCATION

## 2021-12-30 ENCOUNTER — OFFICE VISIT (OUTPATIENT)
Dept: ORTHOPEDIC SURGERY | Age: 56
End: 2021-12-30
Payer: OTHER GOVERNMENT

## 2021-12-30 VITALS — BODY MASS INDEX: 36.31 KG/M2 | HEIGHT: 75 IN | WEIGHT: 292 LBS

## 2021-12-30 DIAGNOSIS — G56.01 CARPAL TUNNEL SYNDROME ON RIGHT: Primary | ICD-10-CM

## 2021-12-30 PROCEDURE — 20526 THER INJECTION CARP TUNNEL: CPT | Performed by: ORTHOPAEDIC SURGERY

## 2021-12-30 RX ORDER — BUPIVACAINE HYDROCHLORIDE 2.5 MG/ML
1 INJECTION, SOLUTION INFILTRATION; PERINEURAL ONCE
Status: COMPLETED | OUTPATIENT
Start: 2021-12-30 | End: 2021-12-30

## 2021-12-30 RX ORDER — METHYLPREDNISOLONE ACETATE 40 MG/ML
40 INJECTION, SUSPENSION INTRA-ARTICULAR; INTRALESIONAL; INTRAMUSCULAR; SOFT TISSUE ONCE
Status: COMPLETED | OUTPATIENT
Start: 2021-12-30 | End: 2021-12-30

## 2021-12-30 RX ADMIN — METHYLPREDNISOLONE ACETATE 40 MG: 40 INJECTION, SUSPENSION INTRA-ARTICULAR; INTRALESIONAL; INTRAMUSCULAR; SOFT TISSUE at 15:11

## 2021-12-30 RX ADMIN — BUPIVACAINE HYDROCHLORIDE 2.5 MG: 2.5 INJECTION, SOLUTION INFILTRATION; PERINEURAL at 15:12

## 2021-12-30 NOTE — PROGRESS NOTES
Under sterile conditions, I injected the right carpal tunnel space with 1 cc of quarter percent Marcaine and 1mL of Depo Medrol Appropriate injection risks and instructions were discussed. The risks as described above functionally would include numbness and weakness in the hand which would alter the mechanics of the hand and potentially make it difficult if not impossible to do his job well. I told him that if that did occur, that I would support his being off of work for that day. The amount of pain that he may get from that injection also would potentially benefit from a work excuse for the day to allow recovery and the ability to return to the workplace today after the injection.

## 2022-01-05 ENCOUNTER — TELEPHONE (OUTPATIENT)
Dept: FAMILY MEDICINE CLINIC | Age: 57
End: 2022-01-05

## 2022-01-05 DIAGNOSIS — R53.83 FATIGUE, UNSPECIFIED TYPE: Primary | ICD-10-CM

## 2022-01-05 NOTE — TELEPHONE ENCOUNTER
Pt is coming in for some blood work on 1/10/22 and he was wanting to get his PSA, A1C and Testerone checked. Will need orders so he can get it done.

## 2022-01-06 NOTE — TELEPHONE ENCOUNTER
Patient advised. Will add the Testosterone and he will have PSA after 3/30/22.   He will contact his Urologist if he feels like it needs to be checked sooner

## 2022-01-06 NOTE — TELEPHONE ENCOUNTER
PSA is covered only 1 time per year for screening - not due until 3/30/2022 - okay to add for testosterone and inform pt

## 2022-01-07 ENCOUNTER — TELEPHONE (OUTPATIENT)
Dept: FAMILY MEDICINE CLINIC | Age: 57
End: 2022-01-07

## 2022-01-07 DIAGNOSIS — E11.9 TYPE 2 DIABETES MELLITUS WITHOUT COMPLICATION, WITHOUT LONG-TERM CURRENT USE OF INSULIN (HCC): Primary | ICD-10-CM

## 2022-01-07 NOTE — TELEPHONE ENCOUNTER
----- Message from Americo Ozuna sent at 1/7/2022  9:07 AM EST -----  Subject: Medication Problem    QUESTIONS  Name of Medication? losartan (COZAAR) 100 MG tablet  Patient-reported dosage and instructions? one pill a day  What question or problem do you have with the medication? Pt says that he   has been experiencing excruciating right side lower back pain. He looked   up side effects, back pain was one with this med. He stopped taking and   the back pain went away. He took a 1/2 pill last night and the pain came   back. (con't below)  Preferred Pharmacy? [facility] [address] [phone]  Pharmacy phone number (if available)? [phone]  Additional Information for Provider? Wants to know what to do and if he   should get bloodwork to check his kidneys on Monday 1/10 when he comes in   for bloodwork. Wants to know if the bloodwork Monday will check kidneys to   make sure they are all right. Please call/advise.   ---------------------------------------------------------------------------  --------------  CALL BACK INFO  What is the best way for the office to contact you? Do not leave any   message, patient will call back for answer  Preferred Call Back Phone Number? [phone]  ---------------------------------------------------------------------------  --------------  SCRIPT ANSWERS  Relationship to Patient?  Self

## 2022-01-10 ENCOUNTER — NURSE ONLY (OUTPATIENT)
Dept: FAMILY MEDICINE CLINIC | Age: 57
End: 2022-01-10
Payer: COMMERCIAL

## 2022-01-10 DIAGNOSIS — R42 DIZZINESS: ICD-10-CM

## 2022-01-10 DIAGNOSIS — E11.9 TYPE 2 DIABETES MELLITUS WITHOUT COMPLICATION, WITHOUT LONG-TERM CURRENT USE OF INSULIN (HCC): ICD-10-CM

## 2022-01-10 DIAGNOSIS — H81.10 BENIGN PAROXYSMAL POSITIONAL VERTIGO, UNSPECIFIED LATERALITY: ICD-10-CM

## 2022-01-10 DIAGNOSIS — R53.83 FATIGUE, UNSPECIFIED TYPE: ICD-10-CM

## 2022-01-10 LAB
A/G RATIO: 1.7 (ref 1.1–2.2)
ALBUMIN SERPL-MCNC: 4.3 G/DL (ref 3.4–5)
ALP BLD-CCNC: 62 U/L (ref 40–129)
ALT SERPL-CCNC: 19 U/L (ref 10–40)
ANION GAP SERPL CALCULATED.3IONS-SCNC: 13 MMOL/L (ref 3–16)
AST SERPL-CCNC: 16 U/L (ref 15–37)
BILIRUB SERPL-MCNC: 1 MG/DL (ref 0–1)
BUN BLDV-MCNC: 19 MG/DL (ref 7–20)
CALCIUM SERPL-MCNC: 9.2 MG/DL (ref 8.3–10.6)
CHLORIDE BLD-SCNC: 105 MMOL/L (ref 99–110)
CO2: 24 MMOL/L (ref 21–32)
CREAT SERPL-MCNC: 0.8 MG/DL (ref 0.9–1.3)
GFR AFRICAN AMERICAN: >60
GFR NON-AFRICAN AMERICAN: >60
GLUCOSE BLD-MCNC: 130 MG/DL (ref 70–99)
POTASSIUM SERPL-SCNC: 4.4 MMOL/L (ref 3.5–5.1)
SODIUM BLD-SCNC: 142 MMOL/L (ref 136–145)
TOTAL PROTEIN: 6.9 G/DL (ref 6.4–8.2)

## 2022-01-10 PROCEDURE — 36415 COLL VENOUS BLD VENIPUNCTURE: CPT | Performed by: FAMILY MEDICINE

## 2022-01-10 RX ORDER — MECLIZINE HYDROCHLORIDE 25 MG/1
TABLET ORAL
Qty: 30 TABLET | Refills: 0 | Status: SHIPPED | OUTPATIENT
Start: 2022-01-10

## 2022-01-10 NOTE — TELEPHONE ENCOUNTER
Pt requests refill on Meclizine 25MG. Pt states he has run out of it and is experiencing the vertigo without it.

## 2022-01-11 ENCOUNTER — TELEPHONE (OUTPATIENT)
Dept: FAMILY MEDICINE CLINIC | Age: 57
End: 2022-01-11

## 2022-01-11 NOTE — TELEPHONE ENCOUNTER
Pt called and informed of blood work results. Pt was told that his liver was going to be checked, wanting to know if it was and what the results are.

## 2022-01-12 LAB — TESTOSTERONE TOTAL: 350 NG/DL (ref 220–1000)

## 2022-01-18 DIAGNOSIS — E11.9 TYPE 2 DIABETES MELLITUS WITHOUT COMPLICATION, WITHOUT LONG-TERM CURRENT USE OF INSULIN (HCC): ICD-10-CM

## 2022-01-18 RX ORDER — GLIMEPIRIDE 1 MG/1
TABLET ORAL
Qty: 30 TABLET | Refills: 5 | Status: SHIPPED | OUTPATIENT
Start: 2022-01-18 | End: 2022-07-15

## 2022-01-24 ENCOUNTER — OFFICE VISIT (OUTPATIENT)
Dept: FAMILY MEDICINE CLINIC | Age: 57
End: 2022-01-24
Payer: COMMERCIAL

## 2022-01-24 VITALS
WEIGHT: 270 LBS | DIASTOLIC BLOOD PRESSURE: 81 MMHG | OXYGEN SATURATION: 98 % | BODY MASS INDEX: 35.78 KG/M2 | HEART RATE: 77 BPM | HEIGHT: 73 IN | SYSTOLIC BLOOD PRESSURE: 147 MMHG

## 2022-01-24 DIAGNOSIS — E78.2 MIXED HYPERLIPIDEMIA: ICD-10-CM

## 2022-01-24 DIAGNOSIS — L72.3 SEBACEOUS CYST: ICD-10-CM

## 2022-01-24 DIAGNOSIS — E11.9 TYPE 2 DIABETES MELLITUS WITHOUT COMPLICATION, WITHOUT LONG-TERM CURRENT USE OF INSULIN (HCC): Primary | ICD-10-CM

## 2022-01-24 DIAGNOSIS — I10 ESSENTIAL HYPERTENSION: ICD-10-CM

## 2022-01-24 DIAGNOSIS — R41.3 MEMORY LOSS: ICD-10-CM

## 2022-01-24 DIAGNOSIS — F41.9 ANXIETY: ICD-10-CM

## 2022-01-24 DIAGNOSIS — I10 UNCONTROLLED HYPERTENSION: ICD-10-CM

## 2022-01-24 PROCEDURE — 99214 OFFICE O/P EST MOD 30 MIN: CPT | Performed by: FAMILY MEDICINE

## 2022-01-24 RX ORDER — LOSARTAN POTASSIUM 25 MG/1
TABLET ORAL
Qty: 30 TABLET | Refills: 5 | Status: SHIPPED | OUTPATIENT
Start: 2022-01-24 | End: 2022-07-20

## 2022-01-24 RX ORDER — ESCITALOPRAM OXALATE 10 MG/1
10 TABLET ORAL DAILY
Qty: 30 TABLET | Refills: 5 | Status: SHIPPED | OUTPATIENT
Start: 2022-01-24 | End: 2022-07-20

## 2022-01-24 SDOH — ECONOMIC STABILITY: FOOD INSECURITY: WITHIN THE PAST 12 MONTHS, YOU WORRIED THAT YOUR FOOD WOULD RUN OUT BEFORE YOU GOT MONEY TO BUY MORE.: NEVER TRUE

## 2022-01-24 SDOH — ECONOMIC STABILITY: FOOD INSECURITY: WITHIN THE PAST 12 MONTHS, THE FOOD YOU BOUGHT JUST DIDN'T LAST AND YOU DIDN'T HAVE MONEY TO GET MORE.: NEVER TRUE

## 2022-01-24 ASSESSMENT — SOCIAL DETERMINANTS OF HEALTH (SDOH)
HOW HARD IS IT FOR YOU TO PAY FOR THE VERY BASICS LIKE FOOD, HOUSING, MEDICAL CARE, AND HEATING?: NOT HARD AT ALL
HOW HARD IS IT FOR YOU TO PAY FOR THE VERY BASICS LIKE FOOD, HOUSING, MEDICAL CARE, AND HEATING?: NOT HARD AT ALL

## 2022-01-24 NOTE — PROGRESS NOTES
Chief Complaint   Patient presents with    Hypertension    Diabetes    Hyperlipidemia        Internal Administration   First Dose      Second Dose           Last COVID Lab SARS-CoV-2 Antibody, Total (no units)   Date Value   2021 Negative             Wt Readings from Last 3 Encounters:   22 270 lb (122.5 kg)   21 292 lb (132.5 kg)   21 292 lb (132.5 kg)     BP Readings from Last 3 Encounters:   22 (!) 147/81   21 124/84   20 133/84      Lab Results   Component Value Date    LABA1C 7.0 2021    LABA1C 7.7 2021    LABA1C 6.9 2020       HPI:  Rica Engel is a 61 y.o. (: 1965) here today for    Follow up on chronic conditions. He does not monitor his BP or BS at home. States he does think changing his medication to Glimeperide has helped some. He was having memory issues and back pain and he felt it may be from the Losartan so he stopped that medication and his back pain has improved and he has not had memory issues since. States he was laid off of his [employer] in  [place]  and was able to return in  but since then he has been having a lot of anxiety/ constant worrying that he may be laid off again and unable to provider for his family. States he has nights where he wakes up in a pool of sweat and having panic attacks. He has a non painful cyst on the upper mid part of his back he has had for many years now. States that 4-5 years ago he was given abx for it and it drained but has now returned. [x] Patient has completed an advance directive  [] Patient has NOT completed an advanced directive  [x] Patient has a documented healthcare surrogate  [] Patient does NOT have a documented healthcare surrogate  [] Discussed the importance of establishing and updating an advanced directive. Patient has questions at this time and those were answered.   [x] Discussed the importance of establishing and updating an advanced directive. Patient does NOT have questions at this time. Discussed with: [x] Patient            [] Family             [] Other caregiver    Patient's medications, allergies, past medical, surgical, social and family histories were reviewed and updated asappropriate on 2022 at 10:19 AM.    ROS:  Review of Systems    All other systems reviewed and are negative except as noted above on 2022 at 10:19 AM. Additional review of systems may be scanned into the media section ofthis medical record. Any responses requiring further intervention were pursued.     Past Medical History:   Diagnosis Date    Carpal tunnel syndrome 10/24/2013    Herniated disc     Hypertension     No history of procedure 2016    Primary localized osteoarthrosis, lower leg 10/17/2013     Family History   Problem Relation Age of Onset    Early Death Father     Heart Disease Father     Cancer Maternal Aunt     Cancer Maternal Grandmother      Social History     Socioeconomic History    Marital status: Single     Spouse name: Not on file    Number of children: Not on file    Years of education: Not on file    Highest education level: Not on file   Occupational History    Not on file   Tobacco Use    Smoking status: Former Smoker     Packs/day: 1.00     Years: 6.00     Pack years: 6.00     Types: Cigarettes     Quit date: 1999     Years since quittin.0    Smokeless tobacco: Never Used    Tobacco comment: Patient does not know how many packs per day, he has not smoked in ovver 20 years   Vaping Use    Vaping Use: Never used   Substance and Sexual Activity    Alcohol use: Yes     Comment: rarely    Drug use: No    Sexual activity: Yes     Partners: Female   Other Topics Concern    Not on file   Social History Narrative    ** Merged History Encounter **          Social Determinants of Health     Financial Resource Strain: Low Risk     Difficulty of Paying Living Expenses: Not hard at all   Food Insecurity: No Food Insecurity    Worried About Running Out of Food in the Last Year: Never true    Pato of Food in the Last Year: Never true   Transportation Needs:     Lack of Transportation (Medical): Not on file    Lack of Transportation (Non-Medical): Not on file   Physical Activity:     Days of Exercise per Week: Not on file    Minutes of Exercise per Session: Not on file   Stress:     Feeling of Stress : Not on file   Social Connections:     Frequency of Communication with Friends and Family: Not on file    Frequency of Social Gatherings with Friends and Family: Not on file    Attends Anabaptist Services: Not on file    Active Member of [address] [place] [address] [employer] or Organizations: Not on file    Attends Club or Organization Meetings: Not on file    Marital Status: Not on file   Intimate Partner Violence:     Fear of Current or Ex-Partner: Not on file    Emotionally Abused: Not on file    Physically Abused: Not on file    Sexually Abused: Not on file   Housing Stability:     Unable to Pay for Housing in the Last Year: Not on file    Number of [place] in the Last Year: Not on file    Unstable Housing in the Last Year: Not on file     Prior to Visit Medications    Medication Sig Taking?  Authorizing Provider   glimepiride (AMARYL) 1 MG tablet TAKE ONE TABLET BY MOUTH EVERY MORNING BEFORE BREAKFAST Yes Morales Rollins MD   meclizine (ANTIVERT) 25 MG tablet TAKE ONE TABLET BY MOUTH ONE HOUR BEFORE TRAVEL, REPEAT EVERY 12 TO 24 HOURS IF NEEDED Yes Morales Rollins MD   losartan (COZAAR) 100 MG tablet TAKE ONE TABLET BY MOUTH DAILY Yes Morales Rollins MD   nabumetone (RELAFEN) 500 MG tablet Take 1 tablet by mouth 2 times daily Yes Anselmo Barlow MD   nabumetone (RELAFEN) 500 MG tablet TAKE ONE TABLET BY MOUTH TWICE A DAY Yes Anselmo Barlow MD   promethazine (PHENERGAN) 25 MG tablet TAKE ONE TABLET BY MOUTH EVERY 6 HOURS AS NEEDED FOR VERTIGO OR NAUSEA Yes SUGAR James - CNP folic acid (FOLVITE) 1 MG tablet Take 1 mg by mouth daily Yes Historical Provider, MD   CLOMIPHENE CITRATE PO Take by mouth From Dr. Isela Joseph Yes Historical Provider, MD   Omega-3 Fatty Acids (FISH OIL PO) Take 3 tablets by mouth daily Yes Historical Provider, MD   methocarbamol (ROBAXIN-750) 750 MG tablet Take 1 tablet by mouth 3 times daily Yes MK Mcrae MD   tadalafil (CIALIS) 5 MG tablet Take 5 mg by mouth as needed for Erectile Dysfunction. Yes Historical Provider, MD   traMADol (ULTRAM) 50 MG tablet Take 50 mg by mouth every 6 hours as needed. Yes Historical Provider, MD   aspirin 81 MG tablet Take 81 mg by mouth daily. Yes Historical Provider, MD     Allergies   Allergen Reactions    Dye [Iodides]     Iodine Swelling    Penicillins Rash       OBJECTIVE:  Estimated body mass index is 35.62 kg/m² as calculated from the following:    Height as of this encounter: 6' 1\" (1.854 m). Weight as of this encounter: 270 lb (122.5 kg). Vitals:    01/24/22 1013   BP: (!) 147/81   Pulse: 77   SpO2: 98%   Weight: 270 lb (122.5 kg)   Height: 6' 1\" (1.854 m)       Physical Exam  Vitals and nursing note reviewed. Constitutional:       General: He is not in acute distress. Appearance: He is well-developed. He is not diaphoretic. HENT:      Head: Normocephalic and atraumatic. Right Ear: External ear normal.      Left Ear: External ear normal.      Nose: Nose normal.   Eyes:      General: Lids are normal. No scleral icterus. Right eye: No discharge. Left eye: No discharge. Pupils: Pupils are equal, round, and reactive to light. Neck:      Thyroid: No thyromegaly. Vascular: No JVD. Cardiovascular:      Rate and Rhythm: Normal rate and regular rhythm. Heart sounds: Normal heart sounds. Pulmonary:      Effort: Pulmonary effort is normal. No respiratory distress. Breath sounds: Normal breath sounds. Abdominal:      Palpations: Abdomen is soft.  There is no hepatomegaly or splenomegaly. Tenderness: There is no abdominal tenderness. Skin:     General: Skin is warm and dry. Coloration: Skin is not pale. Findings: No erythema or rash. Comments: Turgor normal   Psychiatric:         Behavior: Behavior normal.         Thought Content: Thought content normal.         Judgment: Judgment normal.              ASSESSMENT PLAN      Diagnosis Orders   1. Type 2 diabetes mellitus without complication, without long-term current use of insulin (HCC)  Hemoglobin A1C   2. Essential hypertension  losartan (COZAAR) 25 MG tablet   3. Uncontrolled hypertension  losartan (COZAAR) 25 MG tablet   4. Memory loss     5. Mixed hyperlipidemia     6. Sebaceous cyst     7. Anxiety     Blood sugar readings by glycosylated hemoglobin or home fingerstick blood sugars are acceptable and no change in diabetic treatment is necessary. However no A1c was drawn earlier this month as was ordered. Check that again today. He seems to be satisfied on the glimepiride will adjust as needed. He has lost about 20 pounds since last office visit. He believes his memory was worsened on losartan, is willing to restart it since I told him he has nothing for renal protection and diabetes. Take it from 100 mg daily down to 25 mg daily. Follow-up in a month check blood pressure at that time. Also his anxiety over work, start Lexapro 10 mg daily follow-up on that in a month as well. He does think his memory is now better. He will not take statins. Patient should call the office immediately with new or ongoing signs or symptoms or worsening, or proceed to the emergency room. No changes in past medical history, past surgical history, social history, or family history were noted during the patient encounter unless specifically listed above.   All updates of past medical history, past surgical history, social history, or family history were reviewed personally by me during the office visit. All problems listed in the assessment are stable unless noted otherwise. Medication profile reviewed personally by me during the visit. Medication side effects and possible impairments from medications were discussed as applicable. This document was prepared by a combination of typing and transcription through a voice recognition software. Scribe attestation: I,Alisson Will, am scribing for and in the presence of Tyler Sigala MD. Electronically signed by Ayaka Greco on 1/24/2022 at 10:19 AM      Provider attestation:     I, Dr. Michelle Carrillo, personally performed the services described in this documentation, as scribed by the above signed scribe in my presence, and it is both accurate and complete. I agree with the ROS and Past Histories independently gathered by the clinical support staff and the remaining scribed note accurately describes my personal service to the patient.       1/24/2022    11:15 AM

## 2022-01-25 LAB
ESTIMATED AVERAGE GLUCOSE: 134.1 MG/DL
HBA1C MFR BLD: 6.3 %

## 2022-02-28 ENCOUNTER — OFFICE VISIT (OUTPATIENT)
Dept: FAMILY MEDICINE CLINIC | Age: 57
End: 2022-02-28
Payer: COMMERCIAL

## 2022-02-28 VITALS
BODY MASS INDEX: 35.23 KG/M2 | DIASTOLIC BLOOD PRESSURE: 82 MMHG | WEIGHT: 267 LBS | OXYGEN SATURATION: 96 % | HEART RATE: 89 BPM | SYSTOLIC BLOOD PRESSURE: 130 MMHG

## 2022-02-28 DIAGNOSIS — I10 ESSENTIAL HYPERTENSION: ICD-10-CM

## 2022-02-28 DIAGNOSIS — F41.9 ANXIETY: ICD-10-CM

## 2022-02-28 DIAGNOSIS — K13.0 LIP ULCER: Primary | ICD-10-CM

## 2022-02-28 PROCEDURE — 99214 OFFICE O/P EST MOD 30 MIN: CPT | Performed by: FAMILY MEDICINE

## 2022-02-28 NOTE — PROGRESS NOTES
Chief Complaint   Patient presents with    Hypertension        Internal Administration   First Dose      Second Dose           Last COVID Lab SARS-CoV-2 Antibody, Total (no units)   Date Value   2021 Negative             Wt Readings from Last 3 Encounters:   22 267 lb (121.1 kg)   22 270 lb (122.5 kg)   21 292 lb (132.5 kg)     BP Readings from Last 3 Encounters:   22 130/82   22 (!) 147/81   21 124/84      Lab Results   Component Value Date    LABA1C 6.3 2022    LABA1C 7.0 2021    LABA1C 7.7 2021       HPI:  Josué Billy is a 61 y.o. (: 1965) here today for      Patient states that since starting on the losartan 25mg he has felt better and blod pressure today in the office is in ideal range. Patient is not checking his blood pressures at home. He states that his anxiety is doing well on the lexapro. He did bring up that he has had a non-healing sore on his upper inner lip, and it has been there for a year and a half. States it has not grown or worsened, it will just not go away. Discussed need for biopsy, explained that any sore on the body that is not healing can cause concern about possible cancer. Will check his for any swollen lymph nodes. Referred to a dermatologist to have this lesion looked at.     [] Patient has completed an advance directive  [x] Patient has NOT completed an advanced directive  [] Patient has a documented healthcare surrogate  [x] Patient does NOT have a documented healthcare surrogate  [] Discussed the importance of establishing and updating an advanced directive. Patient has questions at this time and those were answered. [x] Discussed the importance of establishing and updating an advanced directive. Patient does NOT have questions at this time.     Discussed with: [x] Patient            [] Family             [] Other caregiver    Patient's medications, allergies, past medical, surgical, social and family histories were reviewed and updated asappropriate on 2022 at 9:54 AM.    ROS:  Review of Systems    All other systems reviewed and are negative except as noted above on 2022 at 9:54 AM. Additional review of systems may be scanned into the media section ofthis medical record. Any responses requiring further intervention were pursued. Past Medical History:   Diagnosis Date    Carpal tunnel syndrome 10/24/2013    Herniated disc     Hypertension     No history of procedure 2016    Primary localized osteoarthrosis, lower leg 10/17/2013     Family History   Problem Relation Age of Onset    Early Death Father     Heart Disease Father     Cancer Maternal Aunt     Cancer Maternal Grandmother      Social History     Socioeconomic History    Marital status: Single     Spouse name: Not on file    Number of children: Not on file    Years of education: Not on file    Highest education level: Not on file   Occupational History    Not on file   Tobacco Use    Smoking status: Former Smoker     Packs/day: 1.00     Years: 6.00     Pack years: 6.00     Types: Cigarettes     Quit date: 1999     Years since quittin.0    Smokeless tobacco: Never Used    Tobacco comment: Patient does not know how many packs per day, he has not smoked in ovver 20 years   Vaping Use    Vaping Use: Never used   Substance and Sexual Activity    Alcohol use: Yes     Comment: rarely    Drug use: No    Sexual activity: Yes     Partners: Female   Other Topics Concern    Not on file   Social History Narrative    ** Merged History Encounter **          Social Determinants of Health     Financial Resource Strain: Low Risk     Difficulty of Paying Living Expenses: Not hard at all   Food Insecurity: No Food Insecurity    Worried About Running Out of Food in the Last Year: Never true    Pato of Food in the Last Year: Never true   Transportation Needs:     Lack of Transportation (Medical):  Not on file    Lack of Transportation (Non-Medical): Not on file   Physical Activity:     Days of Exercise per Week: Not on file    Minutes of Exercise per Session: Not on file   Stress:     Feeling of Stress : Not on file   Social Connections:     Frequency of Communication with Friends and Family: Not on file    Frequency of Social Gatherings with Friends and Family: Not on file    Attends Gnosticist Services: Not on file    Active Member of [address] [place] [address] or Organizations: Not on file    Attends Club or Organization Meetings: Not on file    Marital Status: Not on file   Intimate Partner Violence:     Fear of Current or Ex-Partner: Not on file    Emotionally Abused: Not on file    Physically Abused: Not on file    Sexually Abused: Not on file   Housing Stability:     Unable to Pay for Housing in the Last Year: Not on file    Number of [place] in the Last Year: Not on file    Unstable Housing in the Last Year: Not on file     Prior to Visit Medications    Medication Sig Taking?  Authorizing Provider   losartan (COZAAR) 25 MG tablet 1 daily Yes Brook Mendoza MD   escitalopram (LEXAPRO) 10 MG tablet Take 1 tablet by mouth daily Yes Brook Mendoza MD   glimepiride (AMARYL) 1 MG tablet TAKE ONE TABLET BY MOUTH EVERY MORNING BEFORE BREAKFAST Yes Brook Mendoza MD   meclizine (ANTIVERT) 25 MG tablet TAKE ONE TABLET BY MOUTH ONE HOUR BEFORE TRAVEL, REPEAT EVERY 12 TO 24 HOURS IF NEEDED Yes Brook Mendoza MD   nabumetone (RELAFEN) 500 MG tablet Take 1 tablet by mouth 2 times daily Yes Suzanne Devi MD   nabumetone (RELAFEN) 500 MG tablet TAKE ONE TABLET BY MOUTH TWICE A DAY Yes Suzanne Devi MD   promethazine (PHENERGAN) 25 MG tablet TAKE ONE TABLET BY MOUTH EVERY 6 HOURS AS NEEDED FOR VERTIGO OR NAUSEA Yes SUGAR Parr - CNP   folic acid (FOLVITE) 1 MG tablet Take 1 mg by mouth daily Yes Historical Provider, MD   CLOMIPHENE CITRATE PO Take by mouth From Dr. Paaym Hitchcock No edema. Left lower leg: No edema. Skin:     General: Skin is warm and dry. Coloration: Skin is not pale. Findings: No erythema or rash. Comments: Turgor normal   Neurological:      Mental Status: He is oriented to person, place, and time. Psychiatric:         Mood and Affect: Mood normal.         Behavior: Behavior normal.         Thought Content: Thought content normal.         Judgment: Judgment normal.              ASSESSMENT PLAN      Diagnosis Orders   1. Lip ulcer  [facility] - Delchandu Loaiza DO, Dermatology, [facility]   2. Essential hypertension     3. Anxiety     Patient states the lip ulcers been present now off and on for 18 months. I told him if it was malignant likely it would have progressed, but I am also concerned that is nonhealing. There was no concerning lymphadenopathy. I did suggest referral and possible biopsy. The losartan 25 mg is not giving him any problems and his blood pressure is improved, continue same. The Lexapro 10 mg seems to be helping with his nerves continue same. Follow-up 6 months. Patient should call the office immediately with new or ongoing signs or symptoms or worsening, or proceed to the emergency room. No changes in past medical history, past surgical history, social history, or family history were noted during the patient encounter unless specifically listed above. All updates of past medical history, past surgical history, social history, or family history were reviewed personally by me during the office visit. All problems listed in the assessment are stable unless noted otherwise. Medication profile reviewed personally by me during the visit. Medication side effects and possible impairments from medications were discussed as applicable. This document was prepared by a combination of typing and transcription through a voice recognition software.                 Scribe attestation: Kush Herrera RN, am scribing for and in the presence of Nathanael Serrato MD. Electronically signed by Waleska Bishop RN on 2/28/2022 at 9:54 AM      Provider attestation:     I, Dr. Harper Mondragon, personally performed the services described in this documentation, as scribed by the above signed scribe in my presence, and it is both accurate and complete. I agree with the ROS and Past Histories independently gathered by the clinical support staff and the remaining scribed note accurately describes my personal service to the patient.       2/28/2022    10:18 AM

## 2022-02-28 NOTE — PATIENT INSTRUCTIONS

## 2022-05-19 ENCOUNTER — OFFICE VISIT (OUTPATIENT)
Dept: ORTHOPEDIC SURGERY | Age: 57
End: 2022-05-19
Payer: OTHER GOVERNMENT

## 2022-05-19 VITALS — HEIGHT: 73 IN | RESPIRATION RATE: 16 BRPM | BODY MASS INDEX: 35.39 KG/M2 | WEIGHT: 267 LBS

## 2022-05-19 DIAGNOSIS — G56.02 CARPAL TUNNEL SYNDROME ON LEFT: ICD-10-CM

## 2022-05-19 DIAGNOSIS — G56.01 CARPAL TUNNEL SYNDROME ON RIGHT: Primary | ICD-10-CM

## 2022-05-19 PROCEDURE — 99212 OFFICE O/P EST SF 10 MIN: CPT | Performed by: ORTHOPAEDIC SURGERY

## 2022-05-26 ENCOUNTER — OFFICE VISIT (OUTPATIENT)
Dept: ORTHOPEDIC SURGERY | Age: 57
End: 2022-05-26
Payer: OTHER GOVERNMENT

## 2022-05-26 VITALS — HEIGHT: 73 IN | BODY MASS INDEX: 35.39 KG/M2 | WEIGHT: 267 LBS

## 2022-05-26 DIAGNOSIS — M17.11 PRIMARY OSTEOARTHRITIS OF RIGHT KNEE: Primary | ICD-10-CM

## 2022-05-26 PROCEDURE — 99212 OFFICE O/P EST SF 10 MIN: CPT | Performed by: ORTHOPAEDIC SURGERY

## 2022-05-26 NOTE — PROGRESS NOTES
KNEE VISIT      HISTORY OF PRESENT ILLNESS    Audra Vega is a 61 y.o. male who presents for reevaluation of his right knee. At this time he is tolerating work with his limitations. He is wondering if there are any other newer options of care to help alleviate arthritic pain short of surgery. ROS    Well-documented in the patient medical history form dated 1/23/2020  All other ROS negative except for above.     Past Surgical history    Past Surgical History:   Procedure Laterality Date    CARPAL TUNNEL RELEASE      COLONOSCOPY  11/04/2016    normal    CYST REMOVAL      HAND SURGERY      HERNIA REPAIR      KNEE ARTHROSCOPY      KNEE ARTHROSCOPY Right 12/01/2014    KNEE ARTHROSCOPY Left 11/12/2018    KNEE VIDEO ARTHROSCOPY, MEDIAL MENISECTOMY, CHONDROPLASTY, INTERNAL FIXATION MEDIAL TIBIAL PLATEAU INSUFFICIENCY FRACTURE WITH BONE SUBSTITUTE     KNEE SURGERY      MT KNEE SCOPE, ALLOGRAFT IMPANT Left 11/12/2018    KNEE VIDEO ARTHROSCOPY, MEDIAL MENISECTOMY, CHONDROPLASTY, INTERNAL FIXATION MEDIAL TIBIAL PLATEAU INSUFFICIENCY FRACTURE WITH BONE SUBSTITUTE performed by Jeannine Hernandez MD at [facility]    Past Medical History:   Diagnosis Date    Carpal tunnel syndrome 10/24/2013    Herniated disc     Hypertension     No history of procedure 11/04/2016    Primary localized osteoarthrosis, lower leg 10/17/2013       Allergies    Allergies   Allergen Reactions    Dye [Iodides]     Iodine Swelling    Penicillins Rash       Meds    Current Outpatient Medications   Medication Sig Dispense Refill    losartan (COZAAR) 25 MG tablet 1 daily 30 tablet 5    escitalopram (LEXAPRO) 10 MG tablet Take 1 tablet by mouth daily 30 tablet 5    glimepiride (AMARYL) 1 MG tablet TAKE ONE TABLET BY MOUTH EVERY MORNING BEFORE BREAKFAST 30 tablet 5    meclizine (ANTIVERT) 25 MG tablet TAKE ONE TABLET BY MOUTH ONE HOUR BEFORE TRAVEL, REPEAT EVERY 12 TO 24 HOURS IF NEEDED 30 tablet 0    nabumetone (RELAFEN) 500 MG tablet Take 1 tablet by mouth 2 times daily 60 tablet 5    nabumetone (RELAFEN) 500 MG tablet TAKE ONE TABLET BY MOUTH TWICE A DAY 60 tablet 0    promethazine (PHENERGAN) 25 MG tablet TAKE ONE TABLET BY MOUTH EVERY 6 HOURS AS NEEDED FOR VERTIGO OR NAUSEA 20 tablet 0    folic acid (FOLVITE) 1 MG tablet Take 1 mg by mouth daily      CLOMIPHENE CITRATE PO Take by mouth From Dr. Montserrat Chaney (FISH OIL PO) Take 3 tablets by mouth daily      methocarbamol (ROBAXIN-750) 750 MG tablet Take 1 tablet by mouth 3 times daily 90 tablet 3    tadalafil (CIALIS) 5 MG tablet Take 5 mg by mouth as needed for Erectile Dysfunction.  traMADol (ULTRAM) 50 MG tablet Take 50 mg by mouth every 6 hours as needed.  aspirin 81 MG tablet Take 81 mg by mouth daily.        Current Facility-Administered Medications   Medication Dose Route Frequency Provider Last Rate Last Admin    methylPREDNISolone acetate (DEPO-MEDROL) injection 40 mg  40 mg Intra-artICUlar Once Sade Carrillo MD           Social    Social History     Socioeconomic History    Marital status: Single     Spouse name: Not on file    Number of children: Not on file    Years of education: Not on file    Highest education level: Not on file   Occupational History    Not on file   Tobacco Use    Smoking status: Former Smoker     Packs/day: 1.00     Years: 6.00     Pack years: 6.00     Types: Cigarettes     Quit date: 1999     Years since quittin.3    Smokeless tobacco: Never Used    Tobacco comment: Patient does not know how many packs per day, he has not smoked in ovver 20 years   Vaping Use    Vaping Use: Never used   Substance and Sexual Activity    Alcohol use: Yes     Comment: rarely    Drug use: No    Sexual activity: Yes     Partners: Female   Other Topics Concern    Not on file   Social History Narrative    ** Merged History Encounter **          Social Determinants of Health     Financial Resource Strain: Low Risk     Difficulty of Paying Living Expenses: Not hard at all   Food Insecurity: No Food Insecurity    Worried About Running Out of Food in the Last Year: Never true    Pato of Food in the Last Year: Never true   Transportation Needs:     Lack of Transportation (Medical): Not on file    Lack of Transportation (Non-Medical): Not on file   Physical Activity:     Days of Exercise per Week: Not on file    Minutes of Exercise per Session: Not on file   Stress:     Feeling of Stress : Not on file   Social Connections:     Frequency of Communication with Friends and Family: Not on file    Frequency of Social Gatherings with Friends and Family: Not on file    Attends [employer] Services: Not on file    Active Member of [address] [place] [address] [employer] or Organizations: Not on file    Attends Club or Organization Meetings: Not on file    Marital Status: Not on file   Intimate Partner Violence:     Fear of Current or Ex-Partner: Not on file    Emotionally Abused: Not on file    Physically Abused: Not on file    Sexually Abused: Not on file   Housing Stability:     Unable to Pay for Housing in the Last Year: Not on file    Number of [place] in the Last Year: Not on file    Unstable Housing in the Last Year: Not on file       Family HISTORY    Family History   Problem Relation Age of Onset    Early Death Father     Heart Disease Father     Cancer Maternal Aunt     Cancer Maternal Grandmother        PHYSICAL EXAM    Vital Signs:  Ht 6' 1\" (1.854 m)   Wt 267 lb (121.1 kg)   BMI 35.23 kg/m²   General Appearance:  Normal body habitus. Alert and oriented to person, place, and time. Affect:  Normal.   Gait:  Normal. Good balance and coordination. Skin:  Intact. Sensation:  Intact. Strength:  Intact. Reflexes:  Intact. Pulses:  Intact.    Knee Exam:    Effusion: Negative    Range of Motion Right Left   Extension 0 0   Flexion 115 115     Provocative Test Right Left    Positive Negative Positive Negative Anterior drawer [] [x] [] [x]   Lachman [] [x] [] [x]   Posterior drawer [] [x] [] [x]   Varus testing [] [x] [] [x]   Valgus testing [] [x] [] [x]   Joint line tenderness [x] [] [x] []     Additional Exam Comments: His neurocirculatory lymphatic exam otherwise is normal symmetric to both lower extremities. In the right he does have generalized pain some crepitus mostly posterior laterally with range of motion. He has no gross instability in the right knee. IMAGING STUDIES    X-rays were not performed today    IMPRESSION    Right knee pain secondary to osteoarthritis and sequela of the medial meniscus tear    PLAN      1. Conservative care options including physical therapy, NSAIDs, bracing, and activity modification were discussed. 2.  The indications for therapeutic injections were discussed. 3.  The indications for additional imaging studies were discussed. 4.  After considering the various options discussed, the patient elected to pursue a course that includes periodic evaluation and maintain restrictions as I signed on his form today. Due to the nature of his condition he should not be doing anything different than he has been doing for the last several years. .  1 consideration of care may be treatment by one of my partners Dr. Radha Vera to which we will get a consultation for possible stem cell injections.

## 2022-06-07 LAB
ESTRADIOL, ULTRASENSITIVE: 28.6 PG/ML
PSA, TOTAL: 0.44 NG/ML (ref 0–4)
TESTOSTERONE: 389 NG/DL (ref 175–781)

## 2022-06-09 ENCOUNTER — OFFICE VISIT (OUTPATIENT)
Dept: ORTHOPEDIC SURGERY | Age: 57
End: 2022-06-09
Payer: OTHER GOVERNMENT

## 2022-06-09 DIAGNOSIS — M47.812 CERVICAL SPONDYLOSIS WITHOUT MYELOPATHY: Primary | ICD-10-CM

## 2022-06-09 DIAGNOSIS — S16.1XXD CERVICAL STRAIN, SUBSEQUENT ENCOUNTER: ICD-10-CM

## 2022-06-09 PROCEDURE — 99213 OFFICE O/P EST LOW 20 MIN: CPT | Performed by: ORTHOPAEDIC SURGERY

## 2022-06-09 NOTE — PROGRESS NOTES
Chief Complaint    Neck Pain (WC NECK )      History of Present Illness:  Frances Main is a 61 y.o. male presents for evaluation of his neck and shoulder which she has had pain on since 2008. He was in the care of a physiatrist in [place] and had been given injections in his neck a few times. He now grades pain 3/10 which is fairly persistent. It radiates down his shoulders and he feels a knot on his left trapezial ridge at all times. He also feels a large mass on the left paraspinous musculature of his neck which she feels is been there for a good deal time. This is a workers comp claim and apparently is active related to the carpal tunnel and he has had restrictions for which have been consistent and persistent over the last 14 years. Medical History:  Patient's medications, allergies, past medical, surgical, social and family histories were reviewed and updated as appropriate. Review of Systems:  Pertinent items are noted in HPI  Review of systems reviewed from Patient History Form dated on 6/9/2022 and available in the patient's chart under the Media tab. Vital Signs: There were no vitals taken for this visit. General Exam:   Constitutional: Patient is adequately groomed with no evidence of malnutrition  DTRs: Deep tendon reflexes are intact  Mental Status: The patient is oriented to time, place and person. The patient's mood and affect are appropriate. Lymphatic: The lymphatic examination bilaterally reveals all areas to be without enlargement or induration. Vascular: Examination reveals no swelling or calf tenderness. Peripheral pulses are palpable and 2+. Cervical Spine Examination:    Inspection: He does have a fusiform mass which appears to be well-circumscribed and nonfixed which is about 7 cm long and 3 and half to 4 cm in diameter down the left paraspinous musculature of his neck. It does not appear fluctuant but appears to be a soft tissue mass.     Palpation: He has tenderness to palpation along both sides of his neck and down into the trapezial ridges right worse than left. He has a palpable knot in the right trapezius which is point tender. Range of Motion: He has some limits with range of motion both with lateral flexion rotation and extension    Strength: Symmetric to both upper extremities    Special Tests: Equivocal Spurling's maneuver    Skin: There are no rashes, ulcerations or lesions. Gait: Unremarkable    Reflex diminished but symmetric    Additional Comments:       Additional Examinations:         Left Upper Extremity: Examination of the left upper extremity does not show any tenderness, deformity or injury. Range of motion is unremarkable. There is no gross instability. There are no rashes, ulcerations or lesions. Strength and tone are normal.    Radiology:     X-rays 2 views of cervical spine reveals diffuse lumbar spondylitic change with loss of normal lordosis and most advanced at the C5-6 level. Assessment : Cervical spondylosis with bilateral myelopathy    Large soft tissue mass left paraspinous musculature    Impression:  Encounter Diagnoses   Name Primary?  Cervical strain, subsequent encounter     Cervical spondylosis without myelopathy Yes       Office Procedures:  Orders Placed This Encounter   Procedures    XR CERVICAL SPINE (2-3 VIEWS)     Standing Status:   Future     Number of Occurrences:   1     Standing Expiration Date:   6/9/2023    MRI CERVICAL SPINE WO CONTRAST     Standing Status:   Future     Standing Expiration Date:   6/9/2023     Scheduling Instructions:      [facility], [place], [facility]      [phone]     Order Specific Question:   Reason for exam:     Answer:   CERVICAL RADICULOPATHY       Treatment Plan: At this time because he has had persistent symptoms and has not really had injections were such warranted a good evaluation the recent past besides today's evaluation.   I would recommend an MRI of the cervical spine to ensure he does not have a any more advanced discogenic changes that would necessitate a spine evaluation by physiatry. Otherwise his restrictions should remain the same.

## 2022-06-10 ENCOUNTER — OFFICE VISIT (OUTPATIENT)
Dept: ORTHOPEDIC SURGERY | Age: 57
End: 2022-06-10
Payer: OTHER GOVERNMENT

## 2022-06-10 ENCOUNTER — TELEPHONE (OUTPATIENT)
Dept: ORTHOPEDIC SURGERY | Age: 57
End: 2022-06-10

## 2022-06-10 DIAGNOSIS — M17.11 PRIMARY OSTEOARTHRITIS OF RIGHT KNEE: Primary | ICD-10-CM

## 2022-06-10 PROCEDURE — 99214 OFFICE O/P EST MOD 30 MIN: CPT | Performed by: PHYSICIAN ASSISTANT

## 2022-06-10 NOTE — TELEPHONE ENCOUNTER
Spoke to patient and informed them that their MRI/CT has been authorized and that they can call and schedule scan at their convenience.

## 2022-06-10 NOTE — PROGRESS NOTES
Patient Name: Lopez Campuzano  : 1965  DOS: 6/10/2022        Chief Complaint: No chief complaint on file. History of Present Illness:  Lopez Campuzano is a 61 y.o. male who presents with a chief complaint of continued complaints of pain in the knee with irritation with walking, stairs and with overuse. Pain in the knee regularly with swelling and discomfort. Increase in pain over the past several years time. Patient is here by request of Dr. Tessy Rendon for evaluation regarding chronic right knee pain that is a Workmen's Comp. related injury. He has been following up with Dr. Tessy Rendon since  regarding a injury while at work. Had a arthroscopy in  and then again in  due to a retear of meniscus. He is here to discuss potential pursuance of subchondroplasty as potential pursuit of treatment to delay or replace the need for total knee or arthroplasty as he knows he is in need of at some point in time. 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 He denies any further fall trauma or injury but notes intermittent buckling give way. Utilizes a brace off and on depending on how the knee is feeling rates overall pain is a 3-4 out of 10. Has been doing regular cortisone and viscosupplementation injections which do provide relief. Also utilizing anti-inflammatories intermittently as well as muscle relaxers to help with managing pain. Notes pain mostly to the anterior medial aspect of the knee. And moderate difficulty with stairs and climbing. As well as inclines. Medical History  Current Medications:   Prior to Admission medications    Medication Sig Start Date End Date Taking?  Authorizing Provider   losartan (COZAAR) 25 MG tablet 1 daily 22   Minerva Benavides MD   escitalopram [facility]) 10 MG tablet Take 1 tablet by mouth daily 22   Vinod Patel Rashard Manning MD   glimepiride (AMARYL) 1 MG tablet TAKE ONE TABLET BY MOUTH EVERY MORNING BEFORE BREAKFAST 1/18/22   Christina Tineo MD   meclizine (ANTIVERT) 25 MG tablet TAKE ONE TABLET BY MOUTH ONE HOUR BEFORE TRAVEL, REPEAT EVERY 12 TO 24 HOURS IF NEEDED 1/10/22   Christina Tineo MD   nabumetone (RELAFEN) 500 MG tablet Take 1 tablet by mouth 2 times daily 5/5/21   Jeannine Hernandez MD   nabumetone (RELAFEN) 500 MG tablet TAKE ONE TABLET BY MOUTH TWICE A DAY 2/1/21   Jeannine Hernandez MD   promethazine (PHENERGAN) 25 MG tablet TAKE ONE TABLET BY MOUTH EVERY 6 HOURS AS NEEDED FOR VERTIGO OR NAUSEA 1/14/19   SUGAR Kraus CNP   folic acid (FOLVITE) 1 MG tablet Take 1 mg by mouth daily    Historical Provider, MD   CLOMIPHENE CITRATE PO Take by mouth From Dr. Bonner Spore Provider, MD   Omega-3 Fatty Acids (FISH OIL PO) Take 3 tablets by mouth daily    Historical Provider, MD   methocarbamol (ROBAXIN-750) 750 MG tablet Take 1 tablet by mouth 3 times daily 11/30/16   F Geanie Seeds, MD   tadalafil (CIALIS) 5 MG tablet Take 5 mg by mouth as needed for Erectile Dysfunction. Historical Provider, MD   traMADol (ULTRAM) 50 MG tablet Take 50 mg by mouth every 6 hours as needed. Historical Provider, MD   aspirin 81 MG tablet Take 81 mg by mouth daily. Historical Provider, MD     Allergies: Allergies   Allergen Reactions    Dye [Iodides]     Iodine Swelling    Penicillins Rash       Review of Systems:     Review of Systems ______  Constitutional: Negative for fever and diaphoresis. ____________  Respiratory: Negative for shortness of breath.  ________  Gastrointestinal: Negative for abdominal pain. ________  Musculoskeletal: Positive for joint pain. ____  Skin: Negative for itching. ____  Neurological: Negative for loss of consciousness.  ______________  All other systems reviewed and negative     Past Medical History:   Diagnosis Date    Carpal tunnel syndrome Active Member of Clubs or Organizations: Not on file    Attends Club or Organization Meetings: Not on file    Marital Status: Not on file   Intimate Partner Violence:     Fear of Current or Ex-Partner: Not on file    Emotionally Abused: Not on file    Physically Abused: Not on file    Sexually Abused: Not on file   Housing Stability:     Unable to Pay for Housing in the Last Year: Not on file    Number of [place] in the Last Year: Not on file    Unstable Housing in the Last Year: Not on file         Physical Exam __  Constitutional: She is oriented to person, place, and time and well-developed, well-nourished, and in no distress. No distress. ____  HENT:   Head: Normocephalic and atraumatic. ____  Eyes: Conjunctivae are normal. ________  Cardiovascular: Intact distal pulses. ____  Pulmonary/Chest: Effort normal. ________________________  Neurological: She is alert and oriented to person, place, and time. ____  Skin: Skin is dry. She is not diaphoretic. ____  Psychiatric: Mood, affect and judgment normal. ______          Assessment   Vital Signs: There were no vitals filed for this visit.    right Knee shows evidence for DJD with varus obvious pseudolaxity, pain with weight bearing, antalgic gait and palpable osteophytes. Inspection: Moderate anterior swelling. Swelling is present with mild effusion. The posterior aspect of the knee appears to be full with tenderness. There is no erythema, rash, or ecchymosis. Range of Motion:  Right 0 to 120 degrees  left 0 to 120 degrees     Pain with varus testing    There is mild varus deformity noted    Strength:  Hamstrings rated: 4/5. Quadriceps rated: 5/5    Palpation: There is moderate tenderness along the patellofemoral and medial joint line. Special Tests: Patellar Compression test is positive. Valgus & Varus test is positive. Skin: There are no rashes, ulcerations or lesions.     Gait: Gait pattern is antalgic  Skin shows no rashes/ecchymosis to the affected area, no hyperesthesias, no discoloration, no temperature or color discrepancies. NEUROLOGICALLY: There is no evidence for sensory or motor deficits in the extremity. Coordination appears full with no spacticity or rigidity. Reflexes appear to be symmetric. Distal circulation intact. No signs of RSD. Additional Comments: Hip range of motion intact on the right        Procedure(s): No new procedure performed at today's visit    Diagnostic Test Findings:   Xray   Have reviewed the xrays above from 06/10/22   4 view x-ray of the right knee AP, lateral, sunrise and tunnel views were performed and reviewed today revealing moderate to severe medial knee osteoarthritic changes and moderate patellofemoral osteoarthritic changes and joint space loss. Osteophyte formation noted moderately to the medial and patellofemoral compartments. Varus angulation noted. Mild changes noted to the lateral compartment. No evidence of acute fracture dislocation no evidence of AVN. Assessment and Plan:       Diagnosis Orders   1. Primary osteoarthritis of right knee  XR KNEE RIGHT (MIN 4 VIEWS)              The orders below, if any, were placed during this visit:   Orders Placed This Encounter   Procedures    XR KNEE RIGHT (MIN 4 VIEWS)     Standing Status:   Future     Standing Expiration Date:   6/10/2023              Treatment Plan:   -Discussed with patient that pursuance of subchondral plasty would require an MRI and ultimately would not be the replacement for total knee replacement this procedure would delay the utilization of a total knee.   -Patient expressed the desire for the procedure to be a replacement used in place of total knee and noted that since it will not be replacement surgery would not like to move forward with it at this time.  -Spent 35 minutes in discussion with patient regarding future current treatment management options and discussion of the procedure and potential options regarding partial versus total knee replacement as well.  -Plan for patient to follow-up with Dr. Johnna Flanagan for continued evaluation and management         VIOLETTA Ryder

## 2022-06-21 ENCOUNTER — HOSPITAL ENCOUNTER (OUTPATIENT)
Dept: MRI IMAGING | Age: 57
Discharge: HOME | End: 2022-06-21
Payer: OTHER GOVERNMENT

## 2022-06-21 DIAGNOSIS — S16.1XXD CERVICAL STRAIN, SUBSEQUENT ENCOUNTER: ICD-10-CM

## 2022-06-21 PROCEDURE — 72141 MRI NECK SPINE W/O DYE: CPT

## 2022-06-30 ENCOUNTER — TELEPHONE (OUTPATIENT)
Dept: ORTHOPEDIC SURGERY | Age: 57
End: 2022-06-30

## 2022-06-30 NOTE — TELEPHONE ENCOUNTER
Could I please get a order/ referral to Dr. Giovanni Fenton? [facility] is asking for one.

## 2022-07-06 ENCOUNTER — TELEPHONE (OUTPATIENT)
Dept: ORTHOPEDIC SURGERY | Age: 57
End: 2022-07-06

## 2022-07-06 NOTE — TELEPHONE ENCOUNTER
They referred the patient to Dr Myranda Valderrama at [address] [place] [address] and he was told that he needs an open claims letter. Please call the patient back to let them know this is taken care of.

## 2022-07-15 DIAGNOSIS — E11.9 TYPE 2 DIABETES MELLITUS WITHOUT COMPLICATION, WITHOUT LONG-TERM CURRENT USE OF INSULIN (HCC): ICD-10-CM

## 2022-07-15 RX ORDER — GLIMEPIRIDE 1 MG/1
TABLET ORAL
Qty: 30 TABLET | Refills: 1 | Status: SHIPPED | OUTPATIENT
Start: 2022-07-15 | End: 2022-09-14 | Stop reason: SDUPTHER

## 2022-07-20 DIAGNOSIS — I10 ESSENTIAL HYPERTENSION: ICD-10-CM

## 2022-07-20 DIAGNOSIS — I10 UNCONTROLLED HYPERTENSION: ICD-10-CM

## 2022-07-20 RX ORDER — ESCITALOPRAM OXALATE 10 MG/1
TABLET ORAL
Qty: 30 TABLET | Refills: 5 | Status: SHIPPED | OUTPATIENT
Start: 2022-07-20

## 2022-07-20 RX ORDER — LOSARTAN POTASSIUM 25 MG/1
TABLET ORAL
Qty: 30 TABLET | Refills: 5 | Status: SHIPPED | OUTPATIENT
Start: 2022-07-20

## 2022-08-24 ENCOUNTER — TELEPHONE (OUTPATIENT)
Dept: ORTHOPEDIC SURGERY | Age: 57
End: 2022-08-24

## 2022-08-24 NOTE — TELEPHONE ENCOUNTER
Received a call from [facility]    He is requesting a referral to a different doctor,  having a hard time getting in with [facility]     Requesting a return call

## 2022-09-02 ENCOUNTER — TELEPHONE (OUTPATIENT)
Dept: ORTHOPEDIC SURGERY | Age: 57
End: 2022-09-02

## 2022-09-14 ENCOUNTER — OFFICE VISIT (OUTPATIENT)
Dept: FAMILY MEDICINE CLINIC | Age: 57
End: 2022-09-14
Payer: COMMERCIAL

## 2022-09-14 VITALS
WEIGHT: 264.2 LBS | SYSTOLIC BLOOD PRESSURE: 118 MMHG | HEART RATE: 88 BPM | OXYGEN SATURATION: 95 % | BODY MASS INDEX: 34.86 KG/M2 | DIASTOLIC BLOOD PRESSURE: 68 MMHG

## 2022-09-14 DIAGNOSIS — E78.2 MIXED HYPERLIPIDEMIA: ICD-10-CM

## 2022-09-14 DIAGNOSIS — L72.3 SEBACEOUS CYST: ICD-10-CM

## 2022-09-14 DIAGNOSIS — I10 ESSENTIAL HYPERTENSION: Primary | ICD-10-CM

## 2022-09-14 DIAGNOSIS — G56.02 CARPAL TUNNEL SYNDROME ON LEFT: ICD-10-CM

## 2022-09-14 DIAGNOSIS — G56.01 CARPAL TUNNEL SYNDROME ON RIGHT: ICD-10-CM

## 2022-09-14 DIAGNOSIS — E11.9 TYPE 2 DIABETES MELLITUS WITHOUT COMPLICATION, WITHOUT LONG-TERM CURRENT USE OF INSULIN (HCC): ICD-10-CM

## 2022-09-14 DIAGNOSIS — M47.812 CERVICAL SPONDYLOSIS WITHOUT MYELOPATHY: ICD-10-CM

## 2022-09-14 DIAGNOSIS — M17.11 PRIMARY OSTEOARTHRITIS OF RIGHT KNEE: ICD-10-CM

## 2022-09-14 PROCEDURE — 99214 OFFICE O/P EST MOD 30 MIN: CPT | Performed by: FAMILY MEDICINE

## 2022-09-14 PROCEDURE — 3044F HG A1C LEVEL LT 7.0%: CPT | Performed by: FAMILY MEDICINE

## 2022-09-14 RX ORDER — GLIMEPIRIDE 1 MG/1
TABLET ORAL
Qty: 30 TABLET | Refills: 1 | Status: SHIPPED | OUTPATIENT
Start: 2022-09-14 | End: 2022-09-14 | Stop reason: SDUPTHER

## 2022-09-14 RX ORDER — GLIMEPIRIDE 1 MG/1
TABLET ORAL
Qty: 30 TABLET | Refills: 11 | Status: SHIPPED | OUTPATIENT
Start: 2022-09-14

## 2022-09-14 ASSESSMENT — PATIENT HEALTH QUESTIONNAIRE - PHQ9
SUM OF ALL RESPONSES TO PHQ QUESTIONS 1-9: 1
SUM OF ALL RESPONSES TO PHQ9 QUESTIONS 1 & 2: 1
1. LITTLE INTEREST OR PLEASURE IN DOING THINGS: 0
2. FEELING DOWN, DEPRESSED OR HOPELESS: 1

## 2022-09-14 NOTE — PROGRESS NOTES
Chief Complaint   Patient presents with    Hypertension        Internal Administration   First Dose      Second Dose           Last COVID Lab SARS-CoV-2 Antibody, Total (no units)   Date Value   2021 Negative             Wt Readings from Last 3 Encounters:   22 264 lb 3.2 oz (119.8 kg)   22 260 lb (117.9 kg)   22 267 lb (121.1 kg)     BP Readings from Last 3 Encounters:   22 118/68   22 130/82   22 (!) 147/81      Lab Results   Component Value Date    LABA1C 6.3 2022    LABA1C 7.0 2021    LABA1C 7.7 2021       HPI:  Tavo Duran is a 61 y.o. (: 1965) here today for    Patient states that he did go to see the doctor for his lip, it turned out to be due to too much yeast. It is all healed up now. He states his mood is doing well with his current medications. Patient states that he went to see a  spine doctor and he states that they determined he will go through a series of 3 surgeries over the next 2 years for his back. He is going to have two lipomas removed and then have a spinal repair in two different areas of the spine. He states that he also is seeing the orthopedic doctor about his right knee and he is likely going to have a knee replacement as well. [] Patient has completed an advance directive  [x] Patient has NOT completed an advanced directive  [] Patient has a documented healthcare surrogate  [x] Patient does NOT have a documented healthcare surrogate  [] Discussed the importance of establishing and updating an advanced directive. Patient has questions at this time and those were answered. [x] Discussed the importance of establishing and updating an advanced directive. Patient does NOT have questions at this time.     Discussed with: [x] Patient            [] Family             [] Other caregiver    Patient's medications, allergies, past medical, surgical, social and family histories were reviewed and updated asappropriate on 2022 at 4:16 PM.    ROS:  Review of Systems    All other systems reviewed and are negative except as noted above on 2022 at 4:16 PM. Additional review of systems may be scanned into the media section ofthis medical record. Any responses requiring further intervention were pursued.     Past Medical History:   Diagnosis Date    Carpal tunnel syndrome 10/24/2013    Herniated disc     Hypertension     No history of procedure 2016    Primary localized osteoarthrosis, lower leg 10/17/2013     Family History   Problem Relation Age of Onset    Early Death Father     Heart Disease Father     Cancer Maternal Aunt     Cancer Maternal Grandmother      Social History     Socioeconomic History    Marital status: Single     Spouse name: Not on file    Number of children: Not on file    Years of education: Not on file    Highest education level: Not on file   Occupational History    Not on file   Tobacco Use    Smoking status: Former     Packs/day: 1.00     Years: 6.00     Pack years: 6.00     Types: Cigarettes     Quit date: 1999     Years since quittin.6    Smokeless tobacco: Never    Tobacco comments:     Patient does not know how many packs per day, he has not smoked in ovver 20 years   Vaping Use    Vaping Use: Never used   Substance and Sexual Activity    Alcohol use: Yes     Comment: rarely    Drug use: No    Sexual activity: Yes     Partners: Female   Other Topics Concern    Not on file   Social History Narrative    ** Merged History Encounter **          Social Determinants of Health     Financial Resource Strain: Low Risk     Difficulty of Paying Living Expenses: Not hard at all   Food Insecurity: No Food Insecurity    Worried About Running Out of Food in the Last Year: Never true    Ran Out of Food in the Last Year: Never true   Transportation Needs: Not on file   Physical Activity: Not on file   Stress: Not on file   Social Connections: Not on file   Intimate Partner Violence: Not on file Housing Stability: Not on file     Prior to Visit Medications    Medication Sig Taking? Authorizing Provider   glimepiride (AMARYL) 1 MG tablet TAKE ONE TABLET BY MOUTH EVERY MORNING BEFORE BREAKFAST Yes Olga Salas MD   losartan (COZAAR) 25 MG tablet TAKE ONE TABLET BY MOUTH DAILY Yes Olga Salas MD   escitalopram (LEXAPRO) 10 MG tablet TAKE ONE TABLET BY MOUTH DAILY Yes Olga Salas MD   meclizine (ANTIVERT) 25 MG tablet TAKE ONE TABLET BY MOUTH ONE HOUR BEFORE TRAVEL, REPEAT EVERY 12 TO 24 HOURS IF NEEDED Yes Olga Salas MD   nabumetone (RELAFEN) 500 MG tablet Take 1 tablet by mouth 2 times daily Yes Chris Ledesma MD   nabumetone (RELAFEN) 500 MG tablet TAKE ONE TABLET BY MOUTH TWICE A DAY Yes Chris Ledesma MD   promethazine (PHENERGAN) 25 MG tablet TAKE ONE TABLET BY MOUTH EVERY 6 HOURS AS NEEDED FOR VERTIGO OR NAUSEA Yes Trav Hurtado APRN - CNP   folic acid (FOLVITE) 1 MG tablet Take 1 mg by mouth daily Yes Historical Provider, MD   CLOMIPHENE CITRATE PO Take by mouth From Dr. Lesia Jiménez Yes Historical Provider, MD   Omega-3 Fatty Acids (FISH OIL PO) Take 3 tablets by mouth daily Yes Historical Provider, MD   methocarbamol (ROBAXIN-750) 750 MG tablet Take 1 tablet by mouth 3 times daily Yes MK Chaudhary MD   tadalafil (CIALIS) 5 MG tablet Take 5 mg by mouth as needed for Erectile Dysfunction. Yes Historical Provider, MD   traMADol (ULTRAM) 50 MG tablet Take 50 mg by mouth every 6 hours as needed. Yes Historical Provider, MD   aspirin 81 MG tablet Take 81 mg by mouth daily. Yes Historical Provider, MD     Allergies   Allergen Reactions    Dye [Iodides]     Iodine Swelling    Penicillins Rash       OBJECTIVE:  Estimated body mass index is 34.86 kg/m² as calculated from the following:    Height as of 6/21/22: 6' 1\" (1.854 m). Weight as of this encounter: 264 lb 3.2 oz (119.8 kg).   Vitals:    09/14/22 1552   BP: 118/68   Site: Left Upper Arm   Position: Sitting   Cuff Size: Large Adult   Pulse: 88   SpO2: 95%   Weight: 264 lb 3.2 oz (119.8 kg)       Physical Exam  Vitals and nursing note reviewed. Constitutional:       General: He is not in acute distress. Appearance: He is well-developed. He is not diaphoretic. HENT:      Head: Normocephalic and atraumatic. Right Ear: External ear normal.      Left Ear: External ear normal.      Nose: Nose normal.   Eyes:      General: Lids are normal. No scleral icterus. Right eye: No discharge. Left eye: No discharge. Pupils: Pupils are equal, round, and reactive to light. Neck:      Thyroid: No thyromegaly. Vascular: No JVD. Cardiovascular:      Rate and Rhythm: Normal rate and regular rhythm. Heart sounds: Normal heart sounds. Pulmonary:      Effort: Pulmonary effort is normal. No respiratory distress. Breath sounds: Normal breath sounds. Abdominal:      Palpations: Abdomen is soft. There is no hepatomegaly or splenomegaly. Tenderness: There is no abdominal tenderness. Musculoskeletal:      Right lower leg: No edema. Left lower leg: No edema. Skin:     General: Skin is warm and dry. Coloration: Skin is not pale. Findings: No erythema or rash. Comments: Lipma on the back of neck and upper back     Neurological:      Mental Status: He is oriented to person, place, and time. Psychiatric:         Mood and Affect: Mood normal.         Behavior: Behavior normal.         Thought Content: Thought content normal.         Judgment: Judgment normal.            ASSESSMENT PLAN      Diagnosis Orders   1. Essential hypertension        2. Type 2 diabetes mellitus without complication, without long-term current use of insulin (HCC)  Hemoglobin A1C    glimepiride (AMARYL) 1 MG tablet      3. Cervical spondylosis without myelopathy        4. Primary osteoarthritis of right knee        5. Mixed hyperlipidemia  LIPID PANEL      6. Sebaceous cyst        7. Carpal tunnel syndrome on right        8. Carpal tunnel syndrome on left        Current blood pressure readings in the office or at home are acceptable and current antihypertensive medications as listed in the medication list require no change. Lipids will be monitored based upon levels requiring treatment and other cardiac risks. Medications for hyperlipidemia and hypertriglyceridemia as listed on the medication list will be changed as necessary to reach control parameters. Being bothered by carpal tunnel we will follow-up on this. Reassured about the sebaceous cyst.  Arthritis stable. He is counting down to half-way. May need neck surgery which is affecting his arms and his ambulation. He has been resistant to statins, we figured his score at 21% risk. Hopefully he will consider statins at this time. Follow-up 6 months. Patient should call the office immediately with new or ongoing signs or symptoms or worsening, or proceed to the emergency room. No changes in past medical history, past surgical history, social history, or family history were noted during the patient encounter unless specifically listed above. All updates of past medical history, past surgical history, social history, or family history were reviewed personally by me during the office visit. All problems listed in the assessment are stable unless noted otherwise. Medication profile reviewed personally by me during the visit. Medication side effects and possible impairments from medications were discussed as applicable. This document was prepared by a combination of typing and transcription through a voice recognition software.                 Scribe attestation: Bhargav Nam RN, am scribing for and in the presence of Ciera Shook MD. Electronically signed by Maral Panchal RN on 9/14/2022 at 4:16 PM      Provider attestation:     SIOBHAN, Dr. Tobias Pollock, personally performed the

## 2022-09-14 NOTE — PATIENT INSTRUCTIONS

## 2022-09-15 LAB
CHOLESTEROL, TOTAL: 236 MG/DL (ref 0–199)
ESTIMATED AVERAGE GLUCOSE: 131.2 MG/DL
HBA1C MFR BLD: 6.2 %
HDLC SERPL-MCNC: 34 MG/DL (ref 40–60)
LDL CHOLESTEROL CALCULATED: 155 MG/DL
TRIGL SERPL-MCNC: 235 MG/DL (ref 0–150)
VLDLC SERPL CALC-MCNC: 47 MG/DL

## 2022-09-19 DIAGNOSIS — E78.2 MIXED HYPERLIPIDEMIA: Primary | ICD-10-CM

## 2022-09-19 RX ORDER — ROSUVASTATIN CALCIUM 10 MG/1
10 TABLET, COATED ORAL DAILY
Qty: 30 TABLET | Refills: 5 | Status: SHIPPED | OUTPATIENT
Start: 2022-09-19

## 2022-11-07 ENCOUNTER — OFFICE VISIT (OUTPATIENT)
Dept: FAMILY MEDICINE CLINIC | Age: 57
End: 2022-11-07
Payer: COMMERCIAL

## 2022-11-07 VITALS
BODY MASS INDEX: 35.78 KG/M2 | HEART RATE: 69 BPM | OXYGEN SATURATION: 98 % | HEIGHT: 73 IN | DIASTOLIC BLOOD PRESSURE: 80 MMHG | SYSTOLIC BLOOD PRESSURE: 118 MMHG | WEIGHT: 270 LBS

## 2022-11-07 DIAGNOSIS — Z01.818 PREOP EXAMINATION: Primary | ICD-10-CM

## 2022-11-07 DIAGNOSIS — E78.2 MIXED HYPERLIPIDEMIA: ICD-10-CM

## 2022-11-07 DIAGNOSIS — E01.0 THYROMEGALY: ICD-10-CM

## 2022-11-07 DIAGNOSIS — H61.23 BILATERAL IMPACTED CERUMEN: ICD-10-CM

## 2022-11-07 DIAGNOSIS — E11.9 TYPE 2 DIABETES MELLITUS WITHOUT COMPLICATION, WITHOUT LONG-TERM CURRENT USE OF INSULIN (HCC): ICD-10-CM

## 2022-11-07 DIAGNOSIS — I10 ESSENTIAL HYPERTENSION: ICD-10-CM

## 2022-11-07 PROCEDURE — 3074F SYST BP LT 130 MM HG: CPT | Performed by: NURSE PRACTITIONER

## 2022-11-07 PROCEDURE — 3078F DIAST BP <80 MM HG: CPT | Performed by: NURSE PRACTITIONER

## 2022-11-07 PROCEDURE — 93000 ELECTROCARDIOGRAM COMPLETE: CPT | Performed by: NURSE PRACTITIONER

## 2022-11-07 PROCEDURE — 99214 OFFICE O/P EST MOD 30 MIN: CPT | Performed by: NURSE PRACTITIONER

## 2022-11-07 PROCEDURE — 36415 COLL VENOUS BLD VENIPUNCTURE: CPT | Performed by: NURSE PRACTITIONER

## 2022-11-07 NOTE — PROGRESS NOTES
Subjective:     Patient presents for evaluation of a plugged ear. Patient noticed the symptoms in both ears, several weeks ago. Patient denies ear pain. Patient's medications, allergies, past medical, surgical, social and family histories were reviewed and updated as appropriate. Review of Systems  Pertinent items are noted in HPI. Objective:     Vitals:    11/07/22 1030   BP: 118/80   Site: Right Upper Arm   Position: Sitting   Cuff Size: Large Adult   Pulse: 69   SpO2: 98%   Weight: 270 lb (122.5 kg)   Height: 6' 1\" (1.854 m)       General: alert, appears stated age, and cooperative   Right Ear: Cerumen impaction   Left Ear:  Cerumen impaction   After removal: normal bilaterally         Assessment:      Cerumen Impaction, without otitis externa. Plan:      Cerumen removed by flushing after use of wax softener and currettage. Care instructions given. Home treatment: none. Follow up as needed.

## 2022-11-07 NOTE — PROGRESS NOTES
[address]                             Preoperative Evaluation        Rosaura Linares  YOB: 1965    Date of Service:  11/7/2022    Vitals:    11/07/22 1030   BP: 118/80   Site: Right Upper Arm   Position: Sitting   Cuff Size: Large Adult   Pulse: 69   SpO2: 98%   Weight: 270 lb (122.5 kg)   Height: 6' 1\" (1.854 m)      Wt Readings from Last 2 Encounters:   11/07/22 270 lb (122.5 kg)   09/14/22 264 lb 3.2 oz (119.8 kg)     BP Readings from Last 3 Encounters:   11/07/22 118/80   09/14/22 118/68   02/28/22 130/82        Chief Complaint   Patient presents with    Pre-op Exam     Pt is here for pre op for abscess and cyst removal in his neck by Dr Claire Keane at [facility] on 11/18/22.       Allergies   Allergen Reactions    Dye [Iodides]     Iodine Swelling    Penicillins Rash     Outpatient Medications Marked as Taking for the 11/7/22 encounter (Office Visit) with SUGAR Nesbitt CNP   Medication Sig Dispense Refill    rosuvastatin (CRESTOR) 10 MG tablet Take 1 tablet by mouth daily 30 tablet 5    glimepiride (AMARYL) 1 MG tablet TAKE ONE TABLET BY MOUTH EVERY MORNING BEFORE BREAKFAST 30 tablet 11    losartan (COZAAR) 25 MG tablet TAKE ONE TABLET BY MOUTH DAILY 30 tablet 5    escitalopram (LEXAPRO) 10 MG tablet TAKE ONE TABLET BY MOUTH DAILY 30 tablet 5    meclizine (ANTIVERT) 25 MG tablet TAKE ONE TABLET BY MOUTH ONE HOUR BEFORE TRAVEL, REPEAT EVERY 12 TO 24 HOURS IF NEEDED 30 tablet 0    nabumetone (RELAFEN) 500 MG tablet Take 1 tablet by mouth 2 times daily 60 tablet 5    nabumetone (RELAFEN) 500 MG tablet TAKE ONE TABLET BY MOUTH TWICE A DAY 60 tablet 0    promethazine (PHENERGAN) 25 MG tablet TAKE ONE TABLET BY MOUTH EVERY 6 HOURS AS NEEDED FOR VERTIGO OR NAUSEA 20 tablet 0    folic acid (FOLVITE) 1 MG tablet Take 1 mg by mouth daily      CLOMIPHENE CITRATE PO Take by mouth From Dr. Anastasia Joyner methocarbamol (ROBAXIN-750) 750 MG tablet Take 1 tablet by mouth 3 times daily 90 tablet 3    tadalafil (CIALIS) 5 MG tablet Take 5 mg by mouth as needed for Erectile Dysfunction. traMADol (ULTRAM) 50 MG tablet Take 50 mg by mouth every 6 hours as needed. aspirin 81 MG tablet Take 81 mg by mouth daily. This patient presents to the office today for a preoperative consultation at the request of surgeon, Dr. Krishna Soriano, who plans on performing lipoma removal on November 18 at [facility].  The current problem began several years ago, and symptoms have been worsening with time. Conservative therapy: N/A. Planned anesthesia: General   Known anesthesia problems: Nausea and vomiting after anesthesia   Bleeding risk: No recent or remote history of abnormal bleeding  Personal or FH of DVT/PE: No      Patient Active Problem List   Diagnosis    Sprain and strain of unspecified site of knee and leg    Tear of medial meniscus of right knee, current    Primary localized osteoarthrosis, lower leg    Carpal tunnel syndrome on right    Erectile dysfunction    Obesity (BMI 30-39. 9)    Herniated disc    Mixed hyperlipidemia    Primary osteoarthritis of right knee    Family history of ASCVD (arteriosclerotic cardiovascular disease)    Effusion of left knee    Acute medial meniscus tear of left knee    Acute pain of right knee    Closed fracture of tibial plateau with delayed healing    Closed fracture of medial plateau of left tibia    Cardiovascular risk factor    Type 2 diabetes mellitus without complication, without long-term current use of insulin ([facility])    Post-nasal discharge    Primary osteoarthritis of left knee    Memory loss    Carpal tunnel syndrome on left    Essential hypertension    Anxiety    Sebaceous cyst    Cervical spondylosis without myelopathy    Cervical strain, subsequent encounter       Past Medical History:   Diagnosis Date    Carpal tunnel syndrome 10/24/2013 Herniated disc     Hypertension     Primary localized osteoarthrosis, lower leg 10/17/2013     Past Surgical History:   Procedure Laterality Date    CARPAL TUNNEL RELEASE      COLONOSCOPY  2016    normal    CYST REMOVAL      HAND SURGERY      HERNIA REPAIR      KNEE ARTHROSCOPY      KNEE ARTHROSCOPY Right 2014    KNEE ARTHROSCOPY Left 2018    KNEE VIDEO ARTHROSCOPY, MEDIAL MENISECTOMY, CHONDROPLASTY, INTERNAL FIXATION MEDIAL TIBIAL PLATEAU INSUFFICIENCY FRACTURE WITH BONE SUBSTITUTE     KNEE SURGERY      PA KNEE SCOPE, ALLOGRAFT IMPANT Left 2018    KNEE VIDEO ARTHROSCOPY, MEDIAL MENISECTOMY, CHONDROPLASTY, INTERNAL FIXATION MEDIAL TIBIAL PLATEAU INSUFFICIENCY FRACTURE WITH BONE SUBSTITUTE performed by Brendon Church MD at [facility] OR     Family History   Problem Relation Age of Onset    Early Death Father     Heart Disease Father     Cancer Maternal Aunt     Cancer Maternal Grandmother      Social History     Socioeconomic History    Marital status: Single     Spouse name: Not on file    Number of children: Not on file    Years of education: Not on file    Highest education level: Not on file   Occupational History    Not on file   Tobacco Use    Smoking status: Former     Packs/day: 1.00     Years: 6.00     Pack years: 6.00     Types: Cigarettes     Quit date: 1999     Years since quittin.7    Smokeless tobacco: Never    Tobacco comments:     Patient does not know how many packs per day, he has not smoked in ovver 20 years   Vaping Use    Vaping Use: Never used   Substance and Sexual Activity    Alcohol use: Yes     Comment: rarely    Drug use: No    Sexual activity: Yes     Partners: Female   Other Topics Concern    Not on file   Social History Narrative    ** Merged History Encounter **          Social Determinants of Health     Financial Resource Strain: Low Risk     Difficulty of Paying Living Expenses: Not hard at all   Food Insecurity: No Food Insecurity    Worried About Running Out of Food in the Last Year: Never true    Ran Out of Food in the Last Year: Never true   Transportation Needs: Not on file   Physical Activity: Not on file   Stress: Not on file   Social Connections: Not on file   Intimate Partner Violence: Not on file   Housing Stability: Not on file       Review of Systems  A comprehensive review of systems was negative except for: chronic neck pain, chronic neuropathy in bilateral legs intermittently      Physical Exam   Constitutional: He is oriented to person, place, and time. He appears well-developed and well-nourished. No distress. HENT:   Head: Normocephalic and atraumatic. Mouth/Throat: Uvula is midline, oropharynx is clear and moist and mucous membranes are normal.   Eyes: Conjunctivae and EOM are normal. Pupils are equal, round, and reactive to light. Neck: Trachea normal and normal range of motion. Neck supple. No JVD present. Carotid bruit is not present. No mass and no thyromegaly present. Cardiovascular: Normal rate, regular rhythm, normal heart sounds and intact distal pulses. Exam reveals no gallop and no friction rub. No murmur heard. Pulmonary/Chest: Effort normal and breath sounds normal. No respiratory distress. He has no wheezes. He has no rales. Abdominal: Soft. Normal aorta and bowel sounds are normal. He exhibits no distension and no mass. There is no hepatosplenomegaly. No tenderness. Musculoskeletal: He exhibits no edema and no tenderness. Neurological: He is alert and oriented to person, place, and time. He has normal strength. No cranial nerve deficit or sensory deficit. Coordination and gait normal.   Skin: Skin is warm and dry. No rash noted. No erythema. Psychiatric: He has a normal mood and affect. His behavior is normal.     EKG Interpretation:  normal sinus rhythm. Lab Review   Office Visit on 09/14/2022   Component Date Value    Hemoglobin A1C 09/14/2022 6.2     eAG 09/14/2022 131. 2     Cholesterol, Total 09/14/2022 236 (A)     Triglycerides 09/14/2022 235 (A)     HDL 09/14/2022 34 (A)     LDL Calculated 09/14/2022 155 (A)     VLDL Cholesterol Calcula* 09/14/2022 47    Orders Only on 06/06/2022   Component Date Value    Testosterone 06/06/2022 389     Estradiol, Ultrasensitive 06/06/2022 28.6     PSA, TOTAL 06/06/2022 0.44            Assessment:       61 y.o. patient with planned surgery as above. Known risk factors for perioperative complications: Diabetes mellitus, Hypertension, Hyperlipidemia, SHIMA (not treated)   Current medications which may produce withdrawal symptoms if withheld perioperatively: Tramadol     1. Preop examination    2. Essential hypertension    3. Type 2 diabetes mellitus without complication, without long-term current use of insulin ([facility] [address]    4. Mixed hyperlipidemia    5. Thyromegaly         Plan:     1. Preoperative workup as follows: ECG, hemoglobin, hematocrit, electrolytes, creatinine, liver function studies  2. Further recommendations from consultants: No    3. Change in medication regimen before surgery: Hold all medications on morning of surgery. Stop NSAIDS (Motrin, Aleve, Ibuprofen), vitamin E, aspirin, fish oil 7-10 days prior to surgery unless instructed otherwise by surgeon. Stop Nabumetone for 7 days prior to surgery. 4. Prophylaxis for cardiac events with perioperative beta-blockers: Not indicated  ACC/AHA indications for pre-operative beta-blocker use:    Vascular surgery with history of postitive stress test  Intermediate or high risk surgery with history of CAD   Intermediate or high risk surgery with multiple clinical predictors of CAD- 2 of the following: history of compensated or prior heart failure, history of cerebrovascular disease, DM, or renal insufficiency    Routine administration of higher-dose, long-acting metoprolol in beta-blocker-naïve patients on the day of surgery, and in the absence of dose titration is associated with an overall increase in mortality. Beta-blockers should be started days to weeks prior to surgery and titrated to pulse < 70.  5. Deep vein thrombosis prophylaxis: regimen to be chosen by surgical team  6. No contraindications to planned surgery pending appropriate labs       If you have questions, please do not hesitate to call me ([phone]).      Sincerely,    Jose D Jones, DNP, APRN, FNP-BC

## 2022-11-08 DIAGNOSIS — D58.1 OVALOCYTOSIS (HCC): Primary | ICD-10-CM

## 2022-11-08 LAB
A/G RATIO: 2 (ref 1.1–2.2)
ALBUMIN SERPL-MCNC: 4.3 G/DL (ref 3.4–5)
ALP BLD-CCNC: 64 U/L (ref 40–129)
ALT SERPL-CCNC: 18 U/L (ref 10–40)
ANION GAP SERPL CALCULATED.3IONS-SCNC: 11 MMOL/L (ref 3–16)
AST SERPL-CCNC: 19 U/L (ref 15–37)
ATYPICAL LYMPHOCYTE RELATIVE PERCENT: 1 % (ref 0–6)
BANDED NEUTROPHILS RELATIVE PERCENT: 1 % (ref 0–7)
BASOPHILS ABSOLUTE: 0 K/UL (ref 0–0.2)
BASOPHILS RELATIVE PERCENT: 0 %
BILIRUB SERPL-MCNC: 0.7 MG/DL (ref 0–1)
BUN BLDV-MCNC: 18 MG/DL (ref 7–20)
CALCIUM SERPL-MCNC: 9.1 MG/DL (ref 8.3–10.6)
CHLORIDE BLD-SCNC: 107 MMOL/L (ref 99–110)
CO2: 23 MMOL/L (ref 21–32)
CREAT SERPL-MCNC: 0.8 MG/DL (ref 0.9–1.3)
CRENATED RBC'S: ABNORMAL
EOSINOPHILS ABSOLUTE: 0 K/UL (ref 0–0.6)
EOSINOPHILS RELATIVE PERCENT: 0 %
GFR SERPL CREATININE-BSD FRML MDRD: >60 ML/MIN/{1.73_M2}
GLUCOSE BLD-MCNC: 87 MG/DL (ref 70–99)
HCT VFR BLD CALC: 44.7 % (ref 40.5–52.5)
HEMOGLOBIN: 15 G/DL (ref 13.5–17.5)
LYMPHOCYTES ABSOLUTE: 1.8 K/UL (ref 1–5.1)
LYMPHOCYTES RELATIVE PERCENT: 20 %
MCH RBC QN AUTO: 30 PG (ref 26–34)
MCHC RBC AUTO-ENTMCNC: 33.6 G/DL (ref 31–36)
MCV RBC AUTO: 89.1 FL (ref 80–100)
MONOCYTES ABSOLUTE: 0.4 K/UL (ref 0–1.3)
MONOCYTES RELATIVE PERCENT: 5 %
NEUTROPHILS ABSOLUTE: 6.4 K/UL (ref 1.7–7.7)
NEUTROPHILS RELATIVE PERCENT: 73 %
OVALOCYTES: ABNORMAL
PDW BLD-RTO: 14.7 % (ref 12.4–15.4)
PLATELET # BLD: 188 K/UL (ref 135–450)
PLATELET SLIDE REVIEW: ADEQUATE
PMV BLD AUTO: 8.7 FL (ref 5–10.5)
POIKILOCYTES: ABNORMAL
POTASSIUM SERPL-SCNC: 4.3 MMOL/L (ref 3.5–5.1)
RBC # BLD: 5.02 M/UL (ref 4.2–5.9)
SLIDE REVIEW: ABNORMAL
SMUDGE CELLS: PRESENT
SODIUM BLD-SCNC: 141 MMOL/L (ref 136–145)
TOTAL PROTEIN: 6.4 G/DL (ref 6.4–8.2)
WBC # BLD: 8.7 K/UL (ref 4–11)

## 2022-11-09 DIAGNOSIS — D58.1 OVALOCYTOSIS (HCC): ICD-10-CM

## 2022-11-09 LAB
FOLATE: 11.62 NG/ML (ref 4.78–24.2)
VITAMIN B-12: 420 PG/ML (ref 211–911)

## 2022-11-11 NOTE — PROGRESS NOTES
PAT completed with patient orders placed per MD, patient states brother Leon Caballero will be  and caregiver after procedure. Lucretia Archer RN

## 2022-11-11 NOTE — PROGRESS NOTES

## 2022-11-15 ENCOUNTER — OFFICE VISIT (OUTPATIENT)
Dept: ORTHOPEDIC SURGERY | Age: 57
End: 2022-11-15
Payer: COMMERCIAL

## 2022-11-15 VITALS — BODY MASS INDEX: 35.78 KG/M2 | HEIGHT: 73 IN | WEIGHT: 270 LBS

## 2022-11-15 DIAGNOSIS — M47.812 CERVICAL SPONDYLOSIS WITHOUT MYELOPATHY: Primary | ICD-10-CM

## 2022-11-15 PROCEDURE — 99212 OFFICE O/P EST SF 10 MIN: CPT | Performed by: ORTHOPAEDIC SURGERY

## 2022-11-15 NOTE — PROGRESS NOTES
He returns today for evaluation. He is here for his right shoulder and neck. At this time he is scheduled to have surgery by Dr. Narciso Rich, will have an anterior cervical discectomy and fusion, and 4 months later have a posterior decompression. At this time he still has issues involving his hands and did have an EMG which demonstrated carpal tunnel syndrome. That I would necessarily put on the back burner until such time as he is recovered from his next 2 cervical surgeries prior to recommending surgical intervention for those issues. He has a good understanding of this and should remain on the same restrictions at this time.

## 2022-11-16 ENCOUNTER — OFFICE VISIT (OUTPATIENT)
Dept: ORTHOPEDIC SURGERY | Age: 57
End: 2022-11-16
Payer: COMMERCIAL

## 2022-11-16 VITALS — BODY MASS INDEX: 35.78 KG/M2 | WEIGHT: 270 LBS | HEIGHT: 73 IN

## 2022-11-16 DIAGNOSIS — G56.02 CARPAL TUNNEL SYNDROME ON LEFT: ICD-10-CM

## 2022-11-16 DIAGNOSIS — G56.01 CARPAL TUNNEL SYNDROME ON RIGHT: Primary | ICD-10-CM

## 2022-11-16 PROCEDURE — 99213 OFFICE O/P EST LOW 20 MIN: CPT | Performed by: ORTHOPAEDIC SURGERY

## 2022-11-16 NOTE — PROGRESS NOTES
CARPAL TUNNEL VISIT        HISTORY OF PRESENT ILLNESS    Sherley Castillo is a 61 y.o. male who presents for reevaluation of both hands. He does have an EMG recently which he states demonstrated carpal tunnel syndrome on both sides. His right side is part of this claim and certainly because of compensation of flow through his left is becoming increasingly affected. He also is pending surgery for his neck by a orthopedic spine surgeon who says that part of the hand pain and numbness may be related to his neck. ROS    Well-documented patient history form dated 11/16/2022  All other ROS negative except for above.     Past Surgical history    Past Surgical History:   Procedure Laterality Date    CARPAL TUNNEL RELEASE      COLONOSCOPY  11/04/2016    normal    CYST REMOVAL      HAND SURGERY      HERNIA REPAIR      KNEE ARTHROSCOPY      KNEE ARTHROSCOPY Right 12/01/2014    KNEE ARTHROSCOPY Left 11/12/2018    KNEE VIDEO ARTHROSCOPY, MEDIAL MENISECTOMY, CHONDROPLASTY, INTERNAL FIXATION MEDIAL TIBIAL PLATEAU INSUFFICIENCY FRACTURE WITH BONE SUBSTITUTE     KNEE SURGERY      [place] KNEE SCOPE, ALLOGRAFT IMPANT Left 11/12/2018    KNEE VIDEO ARTHROSCOPY, MEDIAL MENISECTOMY, CHONDROPLASTY, INTERNAL FIXATION MEDIAL TIBIAL PLATEAU INSUFFICIENCY FRACTURE WITH BONE SUBSTITUTE performed by Izabella Peña MD at [address] [place] [address]    Past Medical History:   Diagnosis Date    Carpal tunnel syndrome 10/24/2013    Herniated disc     Hypertension     PONV (postoperative nausea and vomiting)     Primary localized osteoarthrosis, lower leg 10/17/2013       Allergies    Allergies   Allergen Reactions    Dye [Iodides]     Iodine Swelling    Penicillins Rash       Meds    Current Outpatient Medications   Medication Sig Dispense Refill    rosuvastatin (CRESTOR) 10 MG tablet Take 1 tablet by mouth daily 30 tablet 5    glimepiride (AMARYL) 1 MG tablet TAKE ONE TABLET BY MOUTH EVERY MORNING BEFORE BREAKFAST 30 tablet 11    losartan (COZAAR) 25 MG tablet TAKE ONE TABLET BY MOUTH DAILY 30 tablet 5    escitalopram (LEXAPRO) 10 MG tablet TAKE ONE TABLET BY MOUTH DAILY 30 tablet 5    nabumetone (RELAFEN) 500 MG tablet Take 1 tablet by mouth 2 times daily (Patient taking differently: Take 500 mg by mouth 2 times daily as needed) 60 tablet 5    promethazine (PHENERGAN) 25 MG tablet TAKE ONE TABLET BY MOUTH EVERY 6 HOURS AS NEEDED FOR VERTIGO OR NAUSEA 20 tablet 0    folic acid (FOLVITE) 1 MG tablet Take 1 mg by mouth daily      CLOMIPHENE CITRATE PO Take by mouth From Dr. Berto Bridges      tadalafil (CIALIS) 5 MG tablet Take 5 mg by mouth as needed for Erectile Dysfunction. traMADol (ULTRAM) 50 MG tablet Take 50 mg by mouth every 6 hours as needed. aspirin 81 MG tablet Take 81 mg by mouth daily. No current facility-administered medications for this visit.        Social    Social History     Socioeconomic History    Marital status: Single     Spouse name: Not on file    Number of children: Not on file    Years of education: Not on file    Highest education level: Not on file   Occupational History    Not on file   Tobacco Use    Smoking status: Former     Packs/day: 1.00     Years: 6.00     Pack years: 6.00     Types: Cigarettes     Quit date: 1999     Years since quittin.8    Smokeless tobacco: Never    Tobacco comments:     Patient does not know how many packs per day, he has not smoked in ovver 20 years   Vaping Use    Vaping Use: Never used   Substance and Sexual Activity    Alcohol use: Yes     Comment: rarely    Drug use: No    Sexual activity: Yes     Partners: Female   Other Topics Concern    Not on file   Social History Narrative    ** Merged History Encounter **          Social Determinants of Health     Financial Resource Strain: Low Risk     Difficulty of Paying Living Expenses: Not hard at all   Food Insecurity: No Food Insecurity    Worried About [employer] [employer] in the Last Year: Never true    [address] in the Last Year: Never true   Transportation Needs: Not on file   Physical Activity: Not on file   Stress: Not on file   Social Connections: Not on file   Intimate Partner Violence: Not on file   Housing Stability: Not on file       Family HISTORY    Family History   Problem Relation Age of Onset    Early Death Father     Heart Disease Father     Cancer Maternal Aunt     Cancer Maternal Grandmother        PHYSICAL EXAM    Vital Signs:  Ht 6' 1\" (1.854 m)   Wt 270 lb (122.5 kg)   BMI 35.62 kg/m²   General Appearance:  Normal body habitus. Alert and oriented to person, place, and time. Affect:  Normal.   Gait:  Normal. Good balance and coordination. Reflexes:  Intact. Pulses:  Normal.   Skin:  Normal.     Wrist Exam  Hand dominance -right  Surface Exam -no visible lesions      Bilateral hand Exam:      Neurologic Exam:    Reflexes:  Normal  Phalens:  Positive  Tinels:  Positive  Median Nerve Compression:  Positive  Thenar strength: Diminished bilateral  Thenar atrophy: None noted  2 PD: Diminished in the median nerve distribution  Elbow Flexion Test:  Negative    Wrist Motion Right Left   DF     PF     RD     CMC     UD     SUP     PRO       NCV / EMG STUDIES    Positive for bilateral carpal tunnel syndrome and also cervical radiculopathy. He also has some issues considered to be neuropathic in origin, possibly related to diabetes in his lower extremities    IMAGING STUDIES    X-rays not performed today    IMPRESSION    Bilateral carpal tunnel syndrome    PLAN    1. Conservative care options including physical therapy, NSAIDs, bracing, and activity modification were discussed. 2.  The indications for therapeutic injections were discussed. 3.  The indications for additional imaging studies were discussed. 4.  After considering the various options discussed, the patient elected to pursue a course that includes amending his claim to include left carpal tunnel syndrome based upon flow through compensation. At this time I told him also that I would like to see how he does postoperatively after his neck surgery to see if any of his hand symptoms would ayah by that surgery. He should return periodically as directed. The above opinion is my based upon a reasonable medical probability and again based upon compensation of flow through because of altered mechanics from his right upper extremity issues.

## 2022-11-17 ENCOUNTER — ANESTHESIA EVENT (OUTPATIENT)
Dept: OPERATING ROOM | Age: 57
End: 2022-11-17
Payer: COMMERCIAL

## 2022-11-17 NOTE — ANESTHESIA PRE PROCEDURE
Department of Anesthesiology  Preprocedure Note       Name:  Herbert Doan   Age:  61 y.o.  :  1965                                          MRN:  [id number]         Date:  2022      Surgeon: Shanna Corona):  Sean Lorenz MD    Procedure: Procedure(s):  REMOVAL OF LIPOMA AND CYST CERVICAL/THORACIC AREA    Medications prior to admission:   Prior to Admission medications    Medication Sig Start Date End Date Taking? Authorizing Provider   rosuvastatin (CRESTOR) 10 MG tablet Take 1 tablet by mouth daily 22   Yaakov Opitz, MD   glimepiride (AMARYL) 1 MG tablet TAKE ONE TABLET BY MOUTH EVERY MORNING BEFORE BREAKFAST 22   Yaakov Opitz, MD   losartan (COZAAR) 25 MG tablet TAKE ONE TABLET BY MOUTH DAILY 22   Yaakov Opitz, MD   escitalopram (LEXAPRO) 10 MG tablet TAKE ONE TABLET BY MOUTH DAILY 22   Yaakov Opitz, MD   nabumetone (RELAFEN) 500 MG tablet Take 1 tablet by mouth 2 times daily  Patient taking differently: Take 500 mg by mouth 2 times daily as needed 21   Isi Gavin MD   promethazine (PHENERGAN) 25 MG tablet TAKE ONE TABLET BY MOUTH EVERY 6 HOURS AS NEEDED FOR VERTIGO OR NAUSEA 19   SUGAR Cooley CNP   folic acid (FOLVITE) 1 MG tablet Take 1 mg by mouth daily    Historical Provider, MD   CLOMIPHENE CITRATE PO Take by mouth From Dr. Miller Ek Provider, MD   tadalafil (CIALIS) 5 MG tablet Take 5 mg by mouth as needed for Erectile Dysfunction. Historical Provider, MD   traMADol (ULTRAM) 50 MG tablet Take 50 mg by mouth every 6 hours as needed. Historical Provider, MD   aspirin 81 MG tablet Take 81 mg by mouth daily. Historical Provider, MD       Current medications:    No current facility-administered medications for this encounter.      Current Outpatient Medications   Medication Sig Dispense Refill    rosuvastatin (CRESTOR) 10 MG tablet Take 1 tablet by mouth daily 30 tablet 5  glimepiride (AMARYL) 1 MG tablet TAKE ONE TABLET BY MOUTH EVERY MORNING BEFORE BREAKFAST 30 tablet 11    losartan (COZAAR) 25 MG tablet TAKE ONE TABLET BY MOUTH DAILY 30 tablet 5    escitalopram (LEXAPRO) 10 MG tablet TAKE ONE TABLET BY MOUTH DAILY 30 tablet 5    nabumetone (RELAFEN) 500 MG tablet Take 1 tablet by mouth 2 times daily (Patient taking differently: Take 500 mg by mouth 2 times daily as needed) 60 tablet 5    promethazine (PHENERGAN) 25 MG tablet TAKE ONE TABLET BY MOUTH EVERY 6 HOURS AS NEEDED FOR VERTIGO OR NAUSEA 20 tablet 0    folic acid (FOLVITE) 1 MG tablet Take 1 mg by mouth daily      CLOMIPHENE CITRATE PO Take by mouth From Dr. Kaylene Trejo tadalafil (CIALIS) 5 MG tablet Take 5 mg by mouth as needed for Erectile Dysfunction.  traMADol (ULTRAM) 50 MG tablet Take 50 mg by mouth every 6 hours as needed.  aspirin 81 MG tablet Take 81 mg by mouth daily. Allergies: Allergies   Allergen Reactions    Dye [Iodides]     Iodine Swelling    Penicillins Rash       Problem List:    Patient Active Problem List   Diagnosis Code    Sprain and strain of unspecified site of knee and leg [id number]    Tear of medial meniscus of right knee, current S83.241A    Primary localized osteoarthrosis, lower leg M17.10    Carpal tunnel syndrome on right G56.01    Erectile dysfunction N52.9    Obesity (BMI 30-39. 9) E66.9    Herniated disc [id number]    Mixed hyperlipidemia E78.2    Primary osteoarthritis of right knee M17.11    Family history of ASCVD (arteriosclerotic cardiovascular disease) Z82.49    Effusion of left knee M25.462    Acute medial meniscus tear of left knee S83.242A    Acute pain of right knee M25.561    Closed fracture of tibial plateau with delayed healing S82.143G    Closed fracture of medial plateau of left tibia S82.132A    Cardiovascular risk factor Z91.89    Type 2 diabetes mellitus without complication, without long-term current use of insulin (HCC) E11.9    Post-nasal discharge R09.82    Primary osteoarthritis of left knee M17.12    Memory loss R41.3    Carpal tunnel syndrome on left G56.02    Essential hypertension I10    Anxiety F41.9    Sebaceous cyst L72.3    Cervical spondylosis without myelopathy M47.812    Cervical strain, subsequent encounter S16. 1XXD       Past Medical History:        Diagnosis Date    Carpal tunnel syndrome 10/24/2013    Herniated disc     Hypertension     PONV (postoperative nausea and vomiting)     Primary localized osteoarthrosis, lower leg 10/17/2013       Past Surgical History:        Procedure Laterality Date    CARPAL TUNNEL RELEASE      COLONOSCOPY  2016    normal    CYST REMOVAL      HAND SURGERY      HERNIA REPAIR      KNEE ARTHROSCOPY      KNEE ARTHROSCOPY Right 2014    KNEE ARTHROSCOPY Left 2018    KNEE VIDEO ARTHROSCOPY, MEDIAL MENISECTOMY, CHONDROPLASTY, INTERNAL FIXATION MEDIAL TIBIAL PLATEAU INSUFFICIENCY FRACTURE WITH BONE SUBSTITUTE     KNEE SURGERY      [place] KNEE SCOPE, ALLOGRAFT IMPANT Left 2018    KNEE VIDEO ARTHROSCOPY, MEDIAL MENISECTOMY, CHONDROPLASTY, INTERNAL FIXATION MEDIAL TIBIAL PLATEAU INSUFFICIENCY FRACTURE WITH BONE SUBSTITUTE performed by Kori Davis MD at [address] OR       Social History:    Social History     Tobacco Use    Smoking status: Former     Packs/day: 1.00     Years: 6.00     Pack years: 6.00     Types: Cigarettes     Quit date: 1999     Years since quittin.8    Smokeless tobacco: Never    Tobacco comments:     Patient does not know how many packs per day, he has not smoked in ovver 20 years   Substance Use Topics    Alcohol use: Yes     Comment: rarely                                Counseling given: Not Answered  Tobacco comments: Patient does not know how many packs per day, he has not smoked in ovver 20 years      Vital Signs (Current):   Vitals:    22 1431   Weight: 270 lb (122.5 kg)   Height: 6' 1\" (1.854 m) BP Readings from Last 3 Encounters:   11/07/22 118/80   09/14/22 118/68   02/28/22 130/82       NPO Status:                                                                                 BMI:   Wt Readings from Last 3 Encounters:   11/16/22 270 lb (122.5 kg)   11/15/22 270 lb (122.5 kg)   11/07/22 270 lb (122.5 kg)     Body mass index is 35.62 kg/m². CBC:   Lab Results   Component Value Date/Time    WBC 8.7 11/07/2022 11:17 AM    RBC 5.02 11/07/2022 11:17 AM    HGB 15.0 11/07/2022 11:17 AM    HCT 44.7 11/07/2022 11:17 AM    MCV 89.1 11/07/2022 11:17 AM    RDW 14.7 11/07/2022 11:17 AM     11/07/2022 11:17 AM       CMP:   Lab Results   Component Value Date/Time     11/07/2022 11:17 AM    K 4.3 11/07/2022 11:17 AM     11/07/2022 11:17 AM    CO2 23 11/07/2022 11:17 AM    BUN 18 11/07/2022 11:17 AM    CREATININE 0.8 11/07/2022 11:17 AM    GFRAA >60 01/10/2022 08:44 AM    AGRATIO 2.0 11/07/2022 11:17 AM    LABGLOM >60 11/07/2022 11:17 AM    GLUCOSE 87 11/07/2022 11:17 AM    PROT 6.4 11/07/2022 11:17 AM    CALCIUM 9.1 11/07/2022 11:17 AM    BILITOT 0.7 11/07/2022 11:17 AM    ALKPHOS 64 11/07/2022 11:17 AM    AST 19 11/07/2022 11:17 AM    ALT 18 11/07/2022 11:17 AM       POC Tests: No results for input(s): POCGLU, POCNA, POCK, POCCL, POCBUN, POCHEMO, POCHCT in the last 72 hours.     Coags: No results found for: PROTIME, INR, APTT    HCG (If Applicable): No results found for: PREGTESTUR, PREGSERUM, HCG, HCGQUANT     ABGs: No results found for: PHART, PO2ART, BJQ2CQV, ATW5TUJ, BEART, S1GLUYJK     Type & Screen (If Applicable):  No results found for: LABABO, LABRH    Drug/Infectious Status (If Applicable):  No results found for: HIV, HEPCAB    COVID-19 Screening (If Applicable):   Lab Results   Component Value Date/Time    COVID19 Negative 07/26/2021 07:52 AM           Anesthesia Evaluation  Patient summary reviewed and Nursing notes reviewed   history of anesthetic complications: PONV. Airway: Mallampati: II  TM distance: >3 FB   Neck ROM: full  Mouth opening: > = 3 FB   Dental: normal exam         Pulmonary:Negative Pulmonary ROS                              Cardiovascular:    (+) hypertension:,                   Neuro/Psych:   Negative Neuro/Psych ROS  (+) neuromuscular disease:,             GI/Hepatic/Renal: Neg GI/Hepatic/Renal ROS       (-) GERD, liver disease and no renal disease       Endo/Other:    (+) DiabetesType II DM, , .                 Abdominal:             Vascular: negative vascular ROS. Other Findings:           Anesthesia Plan      general     ASA 3     (I discussed with the patient the risks and benefits of PIV, general anesthesia, IV Narcotics, PACU. All questions were answered the patient agrees with the plan)  Induction: intravenous. MIPS: Prophylactic antiemetics administered. Anesthetic plan and risks discussed with patient. Plan discussed with CRNA.                     Fidel Prajapati MD   11/17/2022

## 2022-11-18 ENCOUNTER — ANESTHESIA (OUTPATIENT)
Dept: OPERATING ROOM | Age: 57
End: 2022-11-18
Payer: COMMERCIAL

## 2022-11-18 ENCOUNTER — HOSPITAL ENCOUNTER (OUTPATIENT)
Age: 57
Setting detail: OUTPATIENT SURGERY
Discharge: HOME | End: 2022-11-18
Attending: ORTHOPAEDIC SURGERY | Admitting: ORTHOPAEDIC SURGERY
Payer: COMMERCIAL

## 2022-11-18 VITALS
OXYGEN SATURATION: 92 % | HEIGHT: 73 IN | TEMPERATURE: 97.5 F | WEIGHT: 269 LBS | BODY MASS INDEX: 35.65 KG/M2 | RESPIRATION RATE: 13 BRPM | HEART RATE: 80 BPM | DIASTOLIC BLOOD PRESSURE: 70 MMHG | SYSTOLIC BLOOD PRESSURE: 153 MMHG

## 2022-11-18 DIAGNOSIS — D17.0 LIPOMA OF NECK: Primary | ICD-10-CM

## 2022-11-18 DIAGNOSIS — D17.0 LIPOMA OF NECK: ICD-10-CM

## 2022-11-18 DIAGNOSIS — L72.3 SEBACEOUS CYST: ICD-10-CM

## 2022-11-18 PROBLEM — L08.9 INFECTED SEBACEOUS CYST: Status: ACTIVE | Noted: 2022-01-24

## 2022-11-18 LAB
GLUCOSE BLD-MCNC: 133 MG/DL (ref 70–99)
PERFORMED ON: ABNORMAL

## 2022-11-18 PROCEDURE — 6360000002 HC RX W HCPCS: Performed by: ORTHOPAEDIC SURGERY

## 2022-11-18 PROCEDURE — 2580000003 HC RX 258: Performed by: NURSE ANESTHETIST, CERTIFIED REGISTERED

## 2022-11-18 PROCEDURE — 6360000002 HC RX W HCPCS: Performed by: NURSE ANESTHETIST, CERTIFIED REGISTERED

## 2022-11-18 PROCEDURE — 3600000012 HC SURGERY LEVEL 2 ADDTL 15MIN: Performed by: ORTHOPAEDIC SURGERY

## 2022-11-18 PROCEDURE — 88304 TISSUE EXAM BY PATHOLOGIST: CPT

## 2022-11-18 PROCEDURE — 7100000011 HC PHASE II RECOVERY - ADDTL 15 MIN: Performed by: ORTHOPAEDIC SURGERY

## 2022-11-18 PROCEDURE — 7100000010 HC PHASE II RECOVERY - FIRST 15 MIN: Performed by: ORTHOPAEDIC SURGERY

## 2022-11-18 PROCEDURE — 3700000000 HC ANESTHESIA ATTENDED CARE: Performed by: ORTHOPAEDIC SURGERY

## 2022-11-18 PROCEDURE — 3600000002 HC SURGERY LEVEL 2 BASE: Performed by: ORTHOPAEDIC SURGERY

## 2022-11-18 PROCEDURE — 3700000001 HC ADD 15 MINUTES (ANESTHESIA): Performed by: ORTHOPAEDIC SURGERY

## 2022-11-18 PROCEDURE — 2500000003 HC RX 250 WO HCPCS: Performed by: NURSE ANESTHETIST, CERTIFIED REGISTERED

## 2022-11-18 PROCEDURE — 2709999900 HC NON-CHARGEABLE SUPPLY: Performed by: ORTHOPAEDIC SURGERY

## 2022-11-18 RX ORDER — LIDOCAINE HYDROCHLORIDE 20 MG/ML
INJECTION, SOLUTION INFILTRATION; PERINEURAL PRN
Status: DISCONTINUED | OUTPATIENT
Start: 2022-11-18 | End: 2022-11-18 | Stop reason: SDUPTHER

## 2022-11-18 RX ORDER — SODIUM CHLORIDE 0.9 % (FLUSH) 0.9 %
5-40 SYRINGE (ML) INJECTION PRN
Status: CANCELLED | OUTPATIENT
Start: 2022-11-18

## 2022-11-18 RX ORDER — OXYCODONE HYDROCHLORIDE 5 MG/1
5 TABLET ORAL PRN
Status: CANCELLED | OUTPATIENT
Start: 2022-11-18 | End: 2022-11-18

## 2022-11-18 RX ORDER — OXYCODONE HYDROCHLORIDE 5 MG/1
10 TABLET ORAL PRN
Status: CANCELLED | OUTPATIENT
Start: 2022-11-18 | End: 2022-11-18

## 2022-11-18 RX ORDER — SODIUM CHLORIDE, SODIUM LACTATE, POTASSIUM CHLORIDE, CALCIUM CHLORIDE 600; 310; 30; 20 MG/100ML; MG/100ML; MG/100ML; MG/100ML
INJECTION, SOLUTION INTRAVENOUS CONTINUOUS
Status: DISCONTINUED | OUTPATIENT
Start: 2022-11-18 | End: 2022-11-18 | Stop reason: HOSPADM

## 2022-11-18 RX ORDER — SODIUM CHLORIDE 0.9 % (FLUSH) 0.9 %
5-40 SYRINGE (ML) INJECTION EVERY 12 HOURS SCHEDULED
Status: DISCONTINUED | OUTPATIENT
Start: 2022-11-18 | End: 2022-11-18 | Stop reason: HOSPADM

## 2022-11-18 RX ORDER — MEPERIDINE HYDROCHLORIDE 25 MG/ML
12.5 INJECTION INTRAMUSCULAR; INTRAVENOUS; SUBCUTANEOUS EVERY 5 MIN PRN
Status: CANCELLED | OUTPATIENT
Start: 2022-11-18

## 2022-11-18 RX ORDER — MIDAZOLAM HYDROCHLORIDE 1 MG/ML
1 INJECTION INTRAMUSCULAR; INTRAVENOUS EVERY 5 MIN PRN
Status: CANCELLED | OUTPATIENT
Start: 2022-11-18

## 2022-11-18 RX ORDER — ONDANSETRON 2 MG/ML
INJECTION INTRAMUSCULAR; INTRAVENOUS PRN
Status: DISCONTINUED | OUTPATIENT
Start: 2022-11-18 | End: 2022-11-18 | Stop reason: SDUPTHER

## 2022-11-18 RX ORDER — ONDANSETRON 2 MG/ML
4 INJECTION INTRAMUSCULAR; INTRAVENOUS EVERY 10 MIN PRN
Status: CANCELLED | OUTPATIENT
Start: 2022-11-18

## 2022-11-18 RX ORDER — PROPOFOL 10 MG/ML
INJECTION, EMULSION INTRAVENOUS PRN
Status: DISCONTINUED | OUTPATIENT
Start: 2022-11-18 | End: 2022-11-18 | Stop reason: SDUPTHER

## 2022-11-18 RX ORDER — SODIUM CHLORIDE, SODIUM LACTATE, POTASSIUM CHLORIDE, CALCIUM CHLORIDE 600; 310; 30; 20 MG/100ML; MG/100ML; MG/100ML; MG/100ML
INJECTION, SOLUTION INTRAVENOUS CONTINUOUS PRN
Status: DISCONTINUED | OUTPATIENT
Start: 2022-11-18 | End: 2022-11-18 | Stop reason: SDUPTHER

## 2022-11-18 RX ORDER — BUPIVACAINE HYDROCHLORIDE AND EPINEPHRINE 5; 5 MG/ML; UG/ML
INJECTION, SOLUTION PERINEURAL PRN
Status: DISCONTINUED | OUTPATIENT
Start: 2022-11-18 | End: 2022-11-18 | Stop reason: ALTCHOICE

## 2022-11-18 RX ORDER — SODIUM CHLORIDE 0.9 % (FLUSH) 0.9 %
5-40 SYRINGE (ML) INJECTION EVERY 12 HOURS SCHEDULED
Status: CANCELLED | OUTPATIENT
Start: 2022-11-18

## 2022-11-18 RX ORDER — HYDRALAZINE HYDROCHLORIDE 20 MG/ML
5 INJECTION INTRAMUSCULAR; INTRAVENOUS
Status: CANCELLED | OUTPATIENT
Start: 2022-11-18

## 2022-11-18 RX ORDER — FENTANYL CITRATE 50 UG/ML
INJECTION, SOLUTION INTRAMUSCULAR; INTRAVENOUS PRN
Status: DISCONTINUED | OUTPATIENT
Start: 2022-11-18 | End: 2022-11-18 | Stop reason: SDUPTHER

## 2022-11-18 RX ORDER — SODIUM CHLORIDE 9 MG/ML
INJECTION, SOLUTION INTRAVENOUS PRN
Status: DISCONTINUED | OUTPATIENT
Start: 2022-11-18 | End: 2022-11-18 | Stop reason: HOSPADM

## 2022-11-18 RX ORDER — LIDOCAINE HYDROCHLORIDE 10 MG/ML
1 INJECTION, SOLUTION EPIDURAL; INFILTRATION; INTRACAUDAL; PERINEURAL
Status: DISCONTINUED | OUTPATIENT
Start: 2022-11-18 | End: 2022-11-18 | Stop reason: HOSPADM

## 2022-11-18 RX ORDER — DIPHENHYDRAMINE HYDROCHLORIDE 50 MG/ML
12.5 INJECTION INTRAMUSCULAR; INTRAVENOUS
Status: CANCELLED | OUTPATIENT
Start: 2022-11-18 | End: 2022-11-19

## 2022-11-18 RX ORDER — ROCURONIUM BROMIDE 10 MG/ML
INJECTION, SOLUTION INTRAVENOUS PRN
Status: DISCONTINUED | OUTPATIENT
Start: 2022-11-18 | End: 2022-11-18 | Stop reason: SDUPTHER

## 2022-11-18 RX ORDER — TRAMADOL HYDROCHLORIDE 50 MG/1
50 TABLET ORAL EVERY 8 HOURS PRN
Qty: 9 TABLET | Refills: 0 | Status: SHIPPED | OUTPATIENT
Start: 2022-11-18 | End: 2022-11-21

## 2022-11-18 RX ORDER — SODIUM CHLORIDE 0.9 % (FLUSH) 0.9 %
5-40 SYRINGE (ML) INJECTION PRN
Status: DISCONTINUED | OUTPATIENT
Start: 2022-11-18 | End: 2022-11-18 | Stop reason: HOSPADM

## 2022-11-18 RX ORDER — SODIUM CHLORIDE 9 MG/ML
25 INJECTION, SOLUTION INTRAVENOUS PRN
Status: CANCELLED | OUTPATIENT
Start: 2022-11-18

## 2022-11-18 RX ADMIN — ONDANSETRON HYDROCHLORIDE 4 MG: 2 INJECTION, SOLUTION INTRAMUSCULAR; INTRAVENOUS at 07:30

## 2022-11-18 RX ADMIN — ROCURONIUM BROMIDE 50 MG: 10 INJECTION, SOLUTION INTRAVENOUS at 07:30

## 2022-11-18 RX ADMIN — SUGAMMADEX 200 MG: 100 INJECTION, SOLUTION INTRAVENOUS at 08:33

## 2022-11-18 RX ADMIN — LIDOCAINE HYDROCHLORIDE 80 MG: 20 INJECTION, SOLUTION INFILTRATION; PERINEURAL at 07:30

## 2022-11-18 RX ADMIN — FENTANYL CITRATE 50 MCG: 50 INJECTION INTRAMUSCULAR; INTRAVENOUS at 07:30

## 2022-11-18 RX ADMIN — PROPOFOL 200 MG: 10 INJECTION, EMULSION INTRAVENOUS at 07:30

## 2022-11-18 RX ADMIN — SODIUM CHLORIDE, POTASSIUM CHLORIDE, SODIUM LACTATE AND CALCIUM CHLORIDE: 600; 310; 30; 20 INJECTION, SOLUTION INTRAVENOUS at 07:27

## 2022-11-18 RX ADMIN — Medication 3000 MG: at 07:28

## 2022-11-18 ASSESSMENT — PAIN DESCRIPTION - PAIN TYPE: TYPE: SURGICAL PAIN

## 2022-11-18 ASSESSMENT — PAIN DESCRIPTION - DESCRIPTORS
DESCRIPTORS: ACHING
DESCRIPTORS: ACHING

## 2022-11-18 ASSESSMENT — PAIN DESCRIPTION - FREQUENCY: FREQUENCY: CONTINUOUS

## 2022-11-18 ASSESSMENT — PAIN - FUNCTIONAL ASSESSMENT: PAIN_FUNCTIONAL_ASSESSMENT: 0-10

## 2022-11-18 ASSESSMENT — PAIN DESCRIPTION - LOCATION: LOCATION: SHOULDER;BACK

## 2022-11-18 ASSESSMENT — PAIN DESCRIPTION - ONSET: ONSET: ON-GOING

## 2022-11-18 ASSESSMENT — PAIN DESCRIPTION - ORIENTATION: ORIENTATION: LEFT

## 2022-11-18 ASSESSMENT — PAIN SCALES - GENERAL: PAINLEVEL_OUTOF10: 0

## 2022-11-18 NOTE — H&P
I have reviewed the original preoperative  History and Physical and examined the patient   No recent illnesses  No change from preoperative history and physical   I have discussed the risks, benefits, and alternative treatment options directly with the patient   The patient states understanding of surgery and associated risks and wishes to proceed      I have again counseled the patient about smoking cessation and that if they do not follow this they have an over 30% chance of poor healing. That this needs to be for at least 6 months postoperative.

## 2022-11-18 NOTE — DISCHARGE INSTRUCTIONS
Activity: no lifting, Driving, or Strenuous exercise for 3-4 days    Diet: regular diet    Wound Care: as directed, change CURRENT SILVER COATED dressing IN 7-10 DAYS OR IF COMPLETELY SATURATED, AND THEN  change the regular dressing every third day and as needed to keep the incision area dry and clean    Follow-up with Dr Twin Crowe;   in 2 weeks     WHERE; [address] [place] [id number]    Medications are printed or called in by the office to your pharmacy    . ANESTHESIA DISCHARGE INSTRUCTIONS    You are under the influence of drugs- do not drink alcohol, drive a car, operate machinery(such as power tools, kitchen appliances, etc), sign legal documents, or make any important decisions for 24 hours (or while on pain medications). Children should not ride bikes or [place] or play on gym sets  for 24 hours after surgery. A responsible adult should be with you for 24 hours. Rest at home today- increase activity as tolerated. Progress slowly to a regular diet unless your physician has instructed you otherwise. Drink plenty of water. CALL YOUR DOCTOR IF YOU:  Have moderate to severe nausea or vomiting AND are unable to hold down fluids or prescribed medications. Have bright red bloody drainage from your dressing that won't stop oozing.   Do not get relief with your pain medication    NORMAL (POSSIBLE) SIDE EFFECTS FROM ANESTHESIA:     Confusion, temporary memory loss, delayed reaction times in the first 24 hours  Lightheadedness, dizziness, difficulty focusing, blurred vision  Nausea/vomiting can happen  Shivering, feeling cold, sore throat, cough and muscle aches should stop within 24-48 hours  Trouble urinating - call your surgeon if it has been more than 8 hrs  Bruising or soreness at the IV site - call if it remains red, firm or there is drainage             FEMALES OF CHILDBEARING AGE WHO ARE TAKING BIRTH CONTROL PILLS:  You may have received a medication during your procedure that interferes with the   actions of birth control pills (Bridion or Emend). Use some other kind of birth control in addition to your pills, like a condom, for 1 month after your procedure to prevent unwanted pregnancy. The following instructions are to be followed if you have a known history or diagnosis of sleep apnea: For all sleep apnea patients:  ? Sleep on your side or sitting up in a chair whenever possible, especially the first 24 hours after surgery. ? Use only medicines prescribed by your doctor. ? Do not drink alcohol. ? If you have a dental device to assist you while at rest, use it at all times for the first 24 hours. For patients using CPAP machines:  ? Use your CPAP machine during all periods of sleep as usual.  ? Use your CPAP machine during all periods of daytime rest while on pain medicines. ** Follow up with your primary care doctor for continued care. IF YOU DO NOT TAKE ALL OF YOUR NARCOTIC PAIN MEDICATION, please dispose of them responsibly. There are drop off boxes in the Emergency Departments 24/7 at both [facility] and [facility]. If these locations are not convenient, other options for discarding them can be found at:  [email or web address]    Hospital or office staff may NOT accept any medications to drop off in the cabinet for you.

## 2022-11-18 NOTE — BRIEF OP NOTE
Brief Postoperative Note      Patient: Rosaura Linares  YOB: 1965  MRN: [id number]    Date of Procedure: 11/18/2022    Pre-Op Diagnosis: Lipoma of neck [D17.0]  Sebaceous cyst [L72.3]    Post-Op Diagnosis: Same       Procedure(s):  REMOVAL OF LIPOMA AND CYST CERVICAL/THORACIC AREA    Surgeon(s):  Kike Mccarthy MD    Assistant:  Surgical Assistant: Erasmo Coates    Anesthesia: General    Estimated Blood Loss (mL): Minimal    Complications: None    Specimens:   ID Type Source Tests Collected by Time Destination   A : neck lipoma Tissue Tissue SURGICAL PATHOLOGY Kike Mccarthy MD 11/18/2022 0810        Implants:  * No implants in log *      Drains: * No LDAs found *    Findings: large lipoma 8 cm x 5.5 cm x 4cm , large sebaceous cyst    Electronically signed by Kike Mccarthy MD on 11/18/2022 at 8:51 AM

## 2022-11-18 NOTE — ANESTHESIA POSTPROCEDURE EVALUATION
Department of Anesthesiology  Postprocedure Note    Patient: Rosaura Linares  MRN: [id number]  YOB: 1965  Date of evaluation: 11/18/2022      Procedure Summary     Date: 11/18/22 Room / Location: [place] [address] / [facility]    Anesthesia Start: 6870 Anesthesia Stop: 0845    Procedure: REMOVAL OF LIPOMA AND CYST CERVICAL/THORACIC AREA (Back) Diagnosis:       Lipoma of neck      Sebaceous cyst      (Lipoma of neck [D17.0])      (Sebaceous cyst [L72.3])    Surgeons: Kike Mccarthy MD Responsible Provider: Ivette Santiago MD    Anesthesia Type: general ASA Status: 3          Anesthesia Type: No value filed.     Rosas Phase I: Rosas Score: 9    Rosas Phase II: Rosas Score: 10      Anesthesia Post Evaluation    Patient location during evaluation: PACU  Level of consciousness: awake  Airway patency: patent  Nausea & Vomiting: no nausea  Complications: no  Cardiovascular status: blood pressure returned to baseline  Respiratory status: acceptable  Hydration status: euvolemic

## 2022-11-19 NOTE — OP NOTE
[address]                 [address] [address]                                OPERATIVE REPORT    PATIENT NAME: Cristóbal Reynoso                    :        1965  MED REC NO:   [id number]                          ROOM:  ACCOUNT NO:   [de-identified]                           ADMIT DATE: 2022  PROVIDER:     Elizabeth León MD    DATE OF PROCEDURE:  2022    PROCEDURES PERFORMED:  1. Posterior left paracervical region resection of a large lipoma,  measuring 8 cm in length, 5.5 cm in width, and 4 cm in depth. Sent for  permanent pathology. Appearing benign surgically. 2.  Removal of a large midline upper thoracic region sebaceous cyst,  excisionally resected, irrigated and debrided, and closed primarily. 3.  Removal of a skin tag about the right parathoracic region. 4.  Administration of local anesthetic. SURGEON:  Elizabeth León MD    ASSISTANT:  Raza Guzman surgical assistant. ANESTHESIA:  General.    PREOPERATIVE DIAGNOSES:  1.  Large lipoma about the left paracervical region causing discomfort  and pain for the patient. 2.  A very large sebaceous cyst about the midline upper thoracic region  causing pain and discomfort. 3.  A skin tag just immediately right paracentral from the sebaceous  cyst region. POSTOPERATIVE DIAGNOSES:  1.  Large lipoma about the left paracervical region causing discomfort  and pain for the patient. 2.  A very large sebaceous cyst about the midline upper thoracic region  causing pain and discomfort. 3.  A skin tag just immediately right paracentral from the sebaceous  cyst region. DESCRIPTION OF PROCEDURE:  The patient was taken to the operating room  on 2022 for the above-noted procedures. The patient wished to  undergo such operative procedures due to pain and discomfort related to  these problems.   The patient had undergone previous attempts at more  conservative treatment with failure. The patient stated he understands  the operative procedure risks, including risk of wound healing  difficulties and possible reoccurrence. He understands and wishes to  proceed. Therefore, after uneventful induction of general endotracheal  anesthesia, the patient was positioned on the operative table in the  prone lying position with _____ well padded. The patient was verified  by Surgical team and the posterior cervical and thoracic areas were  prepped and draped in usual sterile fashion. Next, utilizing a #15-blade scalpel, a smaller horizontal incision was  made in the left paracervical region just smaller than the limits of  outlined large paracervical lipoma. Subsequently, the incision was  carried down through the subcuticular region. Next, the lipoma was  encountered. Subsequently, the lipoma itself was very carefully  dissected and removed in total being 8 cm in length, 5.5 cm in width,  and 4 cm in depth. This area was subsequently thoroughly irrigated and  closed in meticulous multilayer stitch fashion. Next, the small skin tag was removed. This required a simple excision. Next and last, the infected large more midline upper thoracic area  sebaceous cyst was excisionally excised. A large amount of sebaceous  material had to be manually debrided. This was then subsequently very  carefully thoroughly irrigated and debrided. All materials were  completely removed. Subsequently, the small sac area left from the  previous sebaceous cyst, and after all linings were removed, was gently  debrided and scored with a clean scalpel to promote healing. Subsequently, this area was then closed in a multilayer stitch fashion. Subsequently, both areas were locally anesthetized. Subsequently,  dressings were applied. The patient was then taken to postanesthesia  recovery room in stable condition. Estimated blood loss was minimal.   There were no operative complications.   Specimen consisted of large  lipoma, sent for permanent pathology, appearing soft and benign at the  time of surgical removal.        Michael Alcantara MD    D: 11/18/2022 9:09:13       T: 11/18/2022 15:12:21     DURAN/[email or web address]  Job#: [id number]     Doc#: [id number]    CC:   Elizabeth León MD

## 2022-11-22 ENCOUNTER — OFFICE VISIT (OUTPATIENT)
Dept: ORTHOPEDIC SURGERY | Age: 57
End: 2022-11-22
Payer: COMMERCIAL

## 2022-11-22 VITALS — BODY MASS INDEX: 35.65 KG/M2 | HEIGHT: 73 IN | WEIGHT: 269 LBS

## 2022-11-22 DIAGNOSIS — M17.11 PRIMARY OSTEOARTHRITIS OF RIGHT KNEE: Primary | ICD-10-CM

## 2022-11-22 PROCEDURE — 99212 OFFICE O/P EST SF 10 MIN: CPT | Performed by: ORTHOPAEDIC SURGERY

## 2022-11-22 NOTE — PROGRESS NOTES
Returns today for evaluation of his right knee. Basically has had some achiness and pain that he grades 2-3 over 10 daily but it gets worse with activity. He did get several months of relief 6+ with viscosupplementation. On physical examination he has no effusion does have some crepitus and pain mostly in the medial compartment. Impression: Osteoarthritis right knee    Recommendation: Request viscosupplementation with cortisone and return for those injections.

## 2022-12-09 ENCOUNTER — HOSPITAL ENCOUNTER (OUTPATIENT)
Dept: ULTRASOUND IMAGING | Age: 57
Discharge: HOME | End: 2022-12-09
Payer: COMMERCIAL

## 2022-12-09 DIAGNOSIS — E01.0 THYROMEGALY: ICD-10-CM

## 2022-12-09 PROCEDURE — 76536 US EXAM OF HEAD AND NECK: CPT

## 2022-12-28 ENCOUNTER — OFFICE VISIT (OUTPATIENT)
Dept: ORTHOPEDIC SURGERY | Age: 57
End: 2022-12-28

## 2022-12-28 VITALS — WEIGHT: 269 LBS | BODY MASS INDEX: 35.65 KG/M2 | HEIGHT: 73 IN

## 2022-12-28 DIAGNOSIS — M17.11 PRIMARY OSTEOARTHRITIS OF RIGHT KNEE: Primary | ICD-10-CM

## 2022-12-28 RX ORDER — TRIAMCINOLONE ACETONIDE 40 MG/ML
40 INJECTION, SUSPENSION INTRA-ARTICULAR; INTRAMUSCULAR ONCE
Status: COMPLETED | OUTPATIENT
Start: 2022-12-28 | End: 2022-12-28

## 2022-12-28 RX ORDER — BUPIVACAINE HYDROCHLORIDE 2.5 MG/ML
7 INJECTION, SOLUTION INFILTRATION; PERINEURAL ONCE
Status: COMPLETED | OUTPATIENT
Start: 2022-12-28 | End: 2022-12-28

## 2022-12-28 RX ADMIN — TRIAMCINOLONE ACETONIDE 40 MG: 40 INJECTION, SUSPENSION INTRA-ARTICULAR; INTRAMUSCULAR at 08:22

## 2022-12-28 RX ADMIN — BUPIVACAINE HYDROCHLORIDE 17.5 MG: 2.5 INJECTION, SOLUTION INFILTRATION; PERINEURAL at 08:22

## 2022-12-28 NOTE — PROGRESS NOTES
Recommendation is for viscosupplementation using Monovisc . The Right knee was injected with 4 ml of Monovisc from an anterolateral joint line approach using a 25-gauge needle under sterile Betadine prep, using ethyl chloride as a topical refrigerant, for a diagnosis of osteoarthritis. The patient appeared to tolerate it well. The patient should return here in 2 months. Recommendation is for a cortisone injection into the right knee. After informed consent was received from the patient, the right knee was injected with 1 mL of 40mg/ml of Kenalog  and 4 mL of 0.25% Marcaine  in the syringe from an anterolateral joint line approach, using a 25-gauge needle, under sterile Betadine prep, using ethyl chloride as a topical refrigerant, for a diagnosis of osteoarthritis. The patient appeared to tolerate it well. The patient should return here periodically as needed. Encounter Diagnosis   Name Primary?     Primary osteoarthritis of right knee Yes        Orders Placed This Encounter   Procedures    08851 - [place] DRAIN/INJECT LARGE JOINT/BURSA

## 2023-01-06 ENCOUNTER — OFFICE VISIT (OUTPATIENT)
Dept: FAMILY MEDICINE CLINIC | Age: 58
End: 2023-01-06

## 2023-01-06 VITALS
HEIGHT: 73 IN | DIASTOLIC BLOOD PRESSURE: 60 MMHG | SYSTOLIC BLOOD PRESSURE: 104 MMHG | WEIGHT: 274 LBS | BODY MASS INDEX: 36.31 KG/M2 | HEART RATE: 76 BPM | OXYGEN SATURATION: 96 %

## 2023-01-06 DIAGNOSIS — Z01.818 PREOP EXAMINATION: Primary | ICD-10-CM

## 2023-01-06 DIAGNOSIS — I10 ESSENTIAL HYPERTENSION: ICD-10-CM

## 2023-01-06 DIAGNOSIS — E11.9 TYPE 2 DIABETES MELLITUS WITHOUT COMPLICATION, WITHOUT LONG-TERM CURRENT USE OF INSULIN (HCC): ICD-10-CM

## 2023-01-06 DIAGNOSIS — E66.9 OBESITY (BMI 30-39.9): ICD-10-CM

## 2023-01-06 ASSESSMENT — PATIENT HEALTH QUESTIONNAIRE - PHQ9
SUM OF ALL RESPONSES TO PHQ QUESTIONS 1-9: 0
SUM OF ALL RESPONSES TO PHQ9 QUESTIONS 1 & 2: 0
SUM OF ALL RESPONSES TO PHQ QUESTIONS 1-9: 0
2. FEELING DOWN, DEPRESSED OR HOPELESS: 0
1. LITTLE INTEREST OR PLEASURE IN DOING THINGS: 0
SUM OF ALL RESPONSES TO PHQ QUESTIONS 1-9: 0
SUM OF ALL RESPONSES TO PHQ QUESTIONS 1-9: 0

## 2023-01-06 NOTE — PROGRESS NOTES
[address]                             Preoperative Evaluation        Rosanne Loya  YOB: 1965    Date of Service:  1/6/2023    Vitals:    01/06/23 0709   BP: 104/60   Site: Left Upper Arm   Position: Sitting   Cuff Size: Large Adult   Pulse: 76   SpO2: 96%   Weight: 274 lb (124.3 kg)   Height: 6' 1\" (1.854 m)      Wt Readings from Last 2 Encounters:   01/06/23 274 lb (124.3 kg)   12/28/22 269 lb (122 kg)     BP Readings from Last 3 Encounters:   01/06/23 104/60   11/18/22 (!) 153/70   11/07/22 118/80        Chief Complaint   Patient presents with    Pre-op Exam     Pt is here for pre op for neck surgery with Dr Evelin Estrada at [facility] on 1/13/23. Allergies   Allergen Reactions    Dye [Iodides]     Iodine Swelling    Penicillins Rash     Outpatient Medications Marked as Taking for the 1/6/23 encounter (Office Visit) with SUGAR Desouza CNP   Medication Sig Dispense Refill    rosuvastatin (CRESTOR) 10 MG tablet Take 1 tablet by mouth daily 30 tablet 5    glimepiride (AMARYL) 1 MG tablet TAKE ONE TABLET BY MOUTH EVERY MORNING BEFORE BREAKFAST 30 tablet 11    losartan (COZAAR) 25 MG tablet TAKE ONE TABLET BY MOUTH DAILY 30 tablet 5    escitalopram (LEXAPRO) 10 MG tablet TAKE ONE TABLET BY MOUTH DAILY 30 tablet 5    nabumetone (RELAFEN) 500 MG tablet Take 1 tablet by mouth 2 times daily (Patient taking differently: Take 500 mg by mouth 2 times daily as needed) 60 tablet 5    promethazine (PHENERGAN) 25 MG tablet TAKE ONE TABLET BY MOUTH EVERY 6 HOURS AS NEEDED FOR VERTIGO OR NAUSEA 20 tablet 0    folic acid (FOLVITE) 1 MG tablet Take 1 mg by mouth daily      CLOMIPHENE CITRATE PO Take by mouth From Dr. Laura Ledezma      tadalafil (CIALIS) 5 MG tablet Take 5 mg by mouth as needed for Erectile Dysfunction. traMADol (ULTRAM) 50 MG tablet Take 50 mg by mouth every 6 hours as needed.       aspirin 81 MG tablet Take 81 mg by mouth daily. This patient presents to the office today for a preoperative consultation at the request of surgeon, Dr. Javier Rendon, who plans on performing anterior decompression surgery of C34 C45 C56 C67, fusion stabilization; possible staged posterior multilevel laminoplasty decompression C2 to T1 t2 on January 13 at [facility].  The current problem began in 2008  and symptoms have been worsening with time. Conservative therapy: Yes: injections, physical therapy, NSAIDS, which has been ineffective. Planned anesthesia: General   Known anesthesia problems: Nausea and vomiting after anesthesia   Bleeding risk: No recent or remote history of abnormal bleeding  Personal or FH of DVT/PE: No    Patient objection to receiving blood products: Patient does not want blood or blood products     Patient Active Problem List   Diagnosis    Sprain and strain of unspecified site of knee and leg    Tear of medial meniscus of right knee, current    Primary localized osteoarthrosis, lower leg    Carpal tunnel syndrome on right    Erectile dysfunction    Obesity (BMI 30-39. 9)    Herniated disc    Mixed hyperlipidemia    Primary osteoarthritis of right knee    Family history of ASCVD (arteriosclerotic cardiovascular disease)    Effusion of left knee    Acute medial meniscus tear of left knee    Acute pain of right knee    Closed fracture of tibial plateau with delayed healing    Closed fracture of medial plateau of left tibia    Cardiovascular risk factor    Type 2 diabetes mellitus without complication, without long-term current use of insulin (HCC)    Post-nasal discharge    Primary osteoarthritis of left knee    Memory loss    Carpal tunnel syndrome on left    Essential hypertension    Anxiety    Infected sebaceous cyst    Cervical spondylosis without myelopathy    Cervical strain, subsequent encounter    Lipoma of neck  painful       Past Medical History: Diagnosis Date    Carpal tunnel syndrome 10/24/2013    Herniated disc     Hypertension     PONV (postoperative nausea and vomiting)     Primary localized osteoarthrosis, lower leg 10/17/2013     Past Surgical History:   Procedure Laterality Date    BACK SURGERY N/A 2022    REMOVAL OF LIPOMA AND CYST CERVICAL/THORACIC AREA performed by Anjel Li MD at [address]      COLONOSCOPY  2016    normal    CYST REMOVAL      HAND SURGERY      HERNIA REPAIR      KNEE ARTHROSCOPY      KNEE ARTHROSCOPY Right 2014    KNEE ARTHROSCOPY Left 2018    KNEE VIDEO ARTHROSCOPY, MEDIAL MENISECTOMY, CHONDROPLASTY, INTERNAL FIXATION MEDIAL TIBIAL PLATEAU INSUFFICIENCY FRACTURE WITH BONE SUBSTITUTE     KNEE SURGERY      RI ARTHROSCOPY KNEE OSTEOCHONDRAL ALLOGRAFT Left 2018    KNEE VIDEO ARTHROSCOPY, MEDIAL MENISECTOMY, CHONDROPLASTY, INTERNAL FIXATION MEDIAL TIBIAL PLATEAU INSUFFICIENCY FRACTURE WITH BONE SUBSTITUTE performed by Kem Zapata MD at [address] OR     Family History   Problem Relation Age of Onset    Early Death Father     Heart Disease Father     Cancer Maternal Aunt     Cancer Maternal Grandmother      Social History     Socioeconomic History    Marital status: Single     Spouse name: Not on file    Number of children: Not on file    Years of education: Not on file    Highest education level: Not on file   Occupational History    Not on file   Tobacco Use    Smoking status: Former     Packs/day: 1.00     Years: 6.00     Pack years: 6.00     Types: Cigarettes     Quit date: 1999     Years since quittin.9    Smokeless tobacco: Never    Tobacco comments:     Patient does not know how many packs per day, he has not smoked in ovver 20 years   Vaping Use    Vaping Use: Never used   Substance and Sexual Activity    Alcohol use: Yes     Comment: rarely    Drug use: No    Sexual activity: Yes     Partners: Female   Other Topics Concern    Not on file   Social History Narrative    ** Merged History Encounter **          Social Determinants of Health     Financial Resource Strain: Low Risk     Difficulty of Paying Living Expenses: Not hard at all   Food Insecurity: No Food Insecurity    Worried About Running Out of Food in the Last Year: Never true    Ran Out of Food in the Last Year: Never true   Transportation Needs: Not on file   Physical Activity: Not on file   Stress: Not on file   Social Connections: Not on file   Intimate Partner Violence: Not on file   Housing Stability: Not on file       Review of Systems  chronic neck pain, chronic neuropathy in bilateral legs intermittently      Physical Exam   Constitutional: He is oriented to person, place, and time. He appears well-developed and well-nourished. No distress. Obese. HENT:   Head: Normocephalic and atraumatic. Mouth/Throat: Uvula is midline, oropharynx is clear and moist and mucous membranes are normal.   Eyes: Conjunctivae and EOM are normal. Pupils are equal, round, and reactive to light. Neck: Trachea normal and normal range of motion. Neck supple. No JVD present. Carotid bruit is not present. No mass and no thyromegaly present. Cardiovascular: Normal rate, regular rhythm, normal heart sounds and intact distal pulses. Exam reveals no gallop and no friction rub. No murmur heard. Pulmonary/Chest: Effort normal and breath sounds normal. No respiratory distress. He has no wheezes. He has no rales. Abdominal: Soft. Normal aorta and bowel sounds are normal. He exhibits no distension and no mass. There is no hepatosplenomegaly. No tenderness. Musculoskeletal: He exhibits no edema and no tenderness. Neurological: He is alert and oriented to person, place, and time. He has normal strength. No cranial nerve deficit or sensory deficit. Coordination and gait normal.   Skin: Skin is warm and dry. No rash noted. No erythema. Psychiatric: He has a normal mood and affect.  His behavior is normal.     EKG Interpretation:  normal sinus rhythm on 11/7/2022. Lab Review   Admission on 11/18/2022, Discharged on 11/18/2022   Component Date Value    POC Glucose 11/18/2022 133 (A)     Performed on 11/18/2022 ACCU-CHEK    Orders Only on 11/09/2022   Component Date Value    Vitamin B-12 11/07/2022 420     Folate 11/07/2022 11.62    Office Visit on 11/07/2022   Component Date Value    WBC 11/07/2022 8.7     RBC 11/07/2022 5.02     Hemoglobin 11/07/2022 15.0     Hematocrit 11/07/2022 44.7     MCV 11/07/2022 89.1     MCH 11/07/2022 30.0     MCHC 11/07/2022 33.6     RDW 11/07/2022 14.7     Platelets 20/04/8551 188     MPV 11/07/2022 8.7     PLATELET SLIDE REVIEW 11/07/2022 Adequate     SLIDE REVIEW 11/07/2022 see below     Neutrophils % 11/07/2022 73.0     Lymphocytes % 11/07/2022 20.0     Monocytes % 11/07/2022 5.0     Eosinophils % 11/07/2022 0.0     Basophils % 11/07/2022 0.0     Neutrophils Absolute 11/07/2022 6.4     Lymphocytes Absolute 11/07/2022 1.8     Monocytes Absolute 11/07/2022 0.4     Eosinophils Absolute 11/07/2022 0.0     Basophils Absolute 11/07/2022 0.0     Bands Relative 11/07/2022 1     Atypical Lymphocytes Rel* 11/07/2022 1     Smudge Cells 11/07/2022 Present (A)     Poikilocytes 11/07/2022 Occasional (A)     CRENATED RBC'S 11/07/2022 Occasional (A)     Ovalocytes 11/07/2022 Occasional (A)     Sodium 11/07/2022 141     Potassium 11/07/2022 4.3     Chloride 11/07/2022 107     CO2 11/07/2022 23     Anion Gap 11/07/2022 11     Glucose 11/07/2022 87     BUN 11/07/2022 18     Creatinine 11/07/2022 0.8 (A)     Est, Glom Filt Rate 11/07/2022 >60     Calcium 11/07/2022 9.1     Total Protein 11/07/2022 6.4     Albumin 11/07/2022 4.3     Albumin/Globulin Ratio 11/07/2022 2.0     Total Bilirubin 11/07/2022 0.7     Alkaline Phosphatase 11/07/2022 64     ALT 11/07/2022 18     AST 11/07/2022 19    Office Visit on 09/14/2022   Component Date Value    Hemoglobin A1C 09/14/2022 6.2     eAG 09/14/2022 131. 2     Cholesterol, Total 09/14/2022 236 (A)     Triglycerides 09/14/2022 235 (A)     HDL 09/14/2022 34 (A)     LDL Calculated 09/14/2022 155 (A)     VLDL Cholesterol Calcula* 09/14/2022 47            Assessment:       61 y.o. patient with planned surgery as above. Known risk factors for perioperative complications: Diabetes mellitus, Hypertension, Obstructive sleep apnea, hyperlipidemia, obesity  Current medications which may produce withdrawal symptoms if withheld perioperatively: Tramadol     1. Preop examination    2. Essential hypertension    3. Type 2 diabetes mellitus without complication, without long-term current use of insulin (HCC)    4. Obesity (BMI 30-39. 9)         Plan:     1. Preoperative workup as follows: History and physical. Labs per surgeon's recommendation  2. Further recommendations from consultants: No    3. Change in medication regimen before surgery: Hold all medications on morning of surgery. Stop NSAIDS (Motrin, Aleve, Ibuprofen), vitamin E, aspirin, fish oil 7-10 days prior to surgery unless instructed otherwise by surgeon. Stop Nabumetone for 7 days prior to surgery. 4. Prophylaxis for cardiac events with perioperative beta-blockers: Not indicated  ACC/AHA indications for pre-operative beta-blocker use:    Vascular surgery with history of postitive stress test  Intermediate or high risk surgery with history of CAD   Intermediate or high risk surgery with multiple clinical predictors of CAD- 2 of the following: history of compensated or prior heart failure, history of cerebrovascular disease, DM, or renal insufficiency    Routine administration of higher-dose, long-acting metoprolol in beta-blocker-naïve patients on the day of surgery, and in the absence of dose titration is associated with an overall increase in mortality. Beta-blockers should be started days to weeks prior to surgery and titrated to pulse < 70.  5. Deep vein thrombosis prophylaxis: regimen to be chosen by surgical team  6.  No contraindications to planned surgery pending appropriate labs       If you have questions, please do not hesitate to call me ([phone]).      Sincerely,    Irlanda Varela, SHARON, APRN, FNP-BC

## 2023-01-06 NOTE — PATIENT INSTRUCTIONS
Hold all medications on morning of surgery. Stop NSAIDS (Motrin, Aleve, Ibuprofen), vitamin E, aspirin, fish oil 7-10 days prior to surgery unless instructed otherwise by surgeon. Stop Nabumetone for 7 days prior to surgery.

## 2023-01-07 DIAGNOSIS — I10 ESSENTIAL HYPERTENSION: ICD-10-CM

## 2023-01-07 DIAGNOSIS — I10 UNCONTROLLED HYPERTENSION: ICD-10-CM

## 2023-01-09 ENCOUNTER — HOSPITAL ENCOUNTER (OUTPATIENT)
Age: 58
Discharge: HOME | End: 2023-01-09
Payer: COMMERCIAL

## 2023-01-09 ENCOUNTER — TELEPHONE (OUTPATIENT)
Dept: ORTHOPEDIC SURGERY | Age: 58
End: 2023-01-09

## 2023-01-09 LAB
ANION GAP SERPL CALCULATED.3IONS-SCNC: 9 MMOL/L (ref 3–16)
BASOPHILS ABSOLUTE: 0 K/UL (ref 0–0.2)
BASOPHILS RELATIVE PERCENT: 0.4 %
BILIRUBIN URINE: NEGATIVE
BLOOD, URINE: NEGATIVE
BUN BLDV-MCNC: 17 MG/DL (ref 7–20)
CALCIUM SERPL-MCNC: 9 MG/DL (ref 8.3–10.6)
CHLORIDE BLD-SCNC: 105 MMOL/L (ref 99–110)
CLARITY: CLEAR
CO2: 25 MMOL/L (ref 21–32)
COLOR: YELLOW
CREAT SERPL-MCNC: 0.8 MG/DL (ref 0.9–1.3)
EOSINOPHILS ABSOLUTE: 0.1 K/UL (ref 0–0.6)
EOSINOPHILS RELATIVE PERCENT: 0.9 %
GFR SERPL CREATININE-BSD FRML MDRD: >60 ML/MIN/{1.73_M2}
GLUCOSE BLD-MCNC: 135 MG/DL (ref 70–99)
GLUCOSE URINE: NEGATIVE MG/DL
HCT VFR BLD CALC: 45.6 % (ref 40.5–52.5)
HEMOGLOBIN: 15.3 G/DL (ref 13.5–17.5)
INR BLD: 0.98 (ref 0.87–1.14)
KETONES, URINE: NEGATIVE MG/DL
LEUKOCYTE ESTERASE, URINE: NEGATIVE
LYMPHOCYTES ABSOLUTE: 1.8 K/UL (ref 1–5.1)
LYMPHOCYTES RELATIVE PERCENT: 21.1 %
MCH RBC QN AUTO: 28.9 PG (ref 26–34)
MCHC RBC AUTO-ENTMCNC: 33.6 G/DL (ref 31–36)
MCV RBC AUTO: 86.1 FL (ref 80–100)
MICROSCOPIC EXAMINATION: NORMAL
MONOCYTES ABSOLUTE: 0.5 K/UL (ref 0–1.3)
MONOCYTES RELATIVE PERCENT: 5.9 %
NEUTROPHILS ABSOLUTE: 6.2 K/UL (ref 1.7–7.7)
NEUTROPHILS RELATIVE PERCENT: 71.7 %
NITRITE, URINE: NEGATIVE
PDW BLD-RTO: 14.1 % (ref 12.4–15.4)
PH UA: 5.5 (ref 5–8)
PLATELET # BLD: 163 K/UL (ref 135–450)
PMV BLD AUTO: 7.6 FL (ref 5–10.5)
POTASSIUM SERPL-SCNC: 4.3 MMOL/L (ref 3.5–5.1)
PROTEIN UA: NEGATIVE MG/DL
PROTHROMBIN TIME: 12.9 SEC (ref 11.7–14.5)
RBC # BLD: 5.3 M/UL (ref 4.2–5.9)
SODIUM BLD-SCNC: 139 MMOL/L (ref 136–145)
SPECIFIC GRAVITY UA: 1.02 (ref 1–1.03)
URINE TYPE: NORMAL
UROBILINOGEN, URINE: 0.2 E.U./DL
WBC # BLD: 8.6 K/UL (ref 4–11)

## 2023-01-09 PROCEDURE — 85610 PROTHROMBIN TIME: CPT

## 2023-01-09 PROCEDURE — 80048 BASIC METABOLIC PNL TOTAL CA: CPT

## 2023-01-09 PROCEDURE — 81003 URINALYSIS AUTO W/O SCOPE: CPT

## 2023-01-09 PROCEDURE — 85025 COMPLETE CBC W/AUTO DIFF WBC: CPT

## 2023-01-09 RX ORDER — ESCITALOPRAM OXALATE 10 MG/1
TABLET ORAL
Qty: 90 TABLET | Refills: 3 | Status: SHIPPED | OUTPATIENT
Start: 2023-01-09

## 2023-01-09 RX ORDER — LOSARTAN POTASSIUM 25 MG/1
TABLET ORAL
Qty: 90 TABLET | Refills: 3 | Status: SHIPPED | OUTPATIENT
Start: 2023-01-09

## 2023-01-09 RX ORDER — NABUMETONE 500 MG/1
500 TABLET, FILM COATED ORAL 2 TIMES DAILY PRN
Qty: 60 TABLET | Refills: 0 | Status: SHIPPED | OUTPATIENT
Start: 2023-01-09

## 2023-01-09 RX ORDER — NABUMETONE 500 MG/1
500 TABLET, FILM COATED ORAL 2 TIMES DAILY
Qty: 60 TABLET | Refills: 5 | Status: CANCELLED | OUTPATIENT
Start: 2023-01-09

## 2023-01-09 RX ORDER — NABUMETONE 500 MG/1
TABLET, FILM COATED ORAL
Qty: 60 TABLET | Refills: 5 | OUTPATIENT
Start: 2023-01-09

## 2023-01-09 NOTE — TELEPHONE ENCOUNTER
Prescription Refill     Medication Name:  NABUMETONE   Pharmacy: [facility] [address] [place] [address]  Patient Contact Number:  [phone]

## 2023-01-18 ENCOUNTER — TELEPHONE (OUTPATIENT)
Dept: FAMILY MEDICINE CLINIC | Age: 58
End: 2023-01-18

## 2023-01-18 NOTE — TELEPHONE ENCOUNTER
[facility] Transitions Initial Follow Up Call    Outreach made within 2 business days of discharge: Yes    Patient: Suyapa Suarez Patient : 1965   MRN: [id number]  Reason for Admission:   There are no discharge diagnoses documented for the most recent discharge. Discharge Date: 22       Spoke with: Nurse at [facility]    Discharge department/facility:[address]    Non-face-to-face services provided:  Scheduled appointment with PCP-     Nurse at [facility] called to inform that the Patient's Losartan was increased to 50 mg daily for - 170. today B/P is 160/75.   HFU scheduled 23    Scheduled appointment with PCP within 7-14 days    Follow Up  Future Appointments   Date Time Provider Sloane Sherman   2023  9:40 AM Boris Davis MD [facility] Kenneth - JOSÉ MIGUEL   3/13/2023  9:40 AM Boris Davis MD [address] [place] [address]       Elijah Duarte RN

## 2023-01-21 ENCOUNTER — HOSPITAL ENCOUNTER (EMERGENCY)
Age: 58
Discharge: HOME | End: 2023-01-21
Attending: EMERGENCY MEDICINE
Payer: OTHER GOVERNMENT

## 2023-01-21 VITALS
RESPIRATION RATE: 16 BRPM | DIASTOLIC BLOOD PRESSURE: 89 MMHG | OXYGEN SATURATION: 94 % | HEART RATE: 81 BPM | TEMPERATURE: 97.8 F | SYSTOLIC BLOOD PRESSURE: 148 MMHG

## 2023-01-21 DIAGNOSIS — Z48.89 ENCOUNTER FOR POSTOPERATIVE WOUND CHECK: Primary | ICD-10-CM

## 2023-01-21 PROCEDURE — 99283 EMERGENCY DEPT VISIT LOW MDM: CPT

## 2023-01-21 NOTE — ED NOTES
Natividad Glass at the transfer center is paging the surgeon on call at [facility] for Dr. Michael Mccloud.       Tato Ayala  01/21/23 2231

## 2023-01-21 NOTE — ED NOTES
Transfer center called and asked if MD at [facility] has called back yet. No call back, transfer center to try  again.      Karen Rea RN  01/21/23 8655

## 2023-01-21 NOTE — ED PROVIDER NOTES
[facility]     Pt Name: Gianna Garrett   MRN: [id number]   [place] 1965   Date of evaluation: 1/21/2023   Provider: Jena Gonzalez MD   PCP: Liz Staton MD   Note Started: 8:17 PM EST 1/21/23     CHIEF COMPLAINT     Chief Complaint   Patient presents with    Post-op Problem     Pt had neck surgery Tuesday and states that he was changing the pads on his miami J collar and had some leakage down his chest from his incision site. HISTORY OF PRESENT ILLNESS:  History from : Patient   Limitations to history : None     Gianna Garrett is a 61 y.o. male who presents after some drainage came out of his postoperative wound. He denies any fever or chills. The patient is 4 days status post an anterior cervical discectomy and fusion of the spine anteriorly. He denies any chest pain shortness of breath or any other symptoms other than some clearish fluid with brown tingeing to it that leaked from the inferior portion of his wound. Nursing Notes were all reviewed and agreed with or any disagreements were addressed in the HPI.     ROS: Positives and Pertinent negatives as per HPI. PAST MEDICAL HISTORY     PHYSICAL EXAM:  ED Triage Vitals [01/21/23 1430]   BP Temp Temp Source Heart Rate Resp SpO2 Height Weight   (!) 144/90 97.8 °F (36.6 °C) Oral 84 16 93 % -- --        Physical Exam   PHYSICAL EXAM  BP (!) 148/89   Pulse 81   Temp 97.8 °F (36.6 °C) (Oral)   Resp 16   SpO2 94%   GENERAL APPEARANCE: Awake and alert. Well appearing. No acute distress. HEAD: Normocephalic. Atraumatic. EYES: No scleral icterus  ENT: Mucous membranes are moist.   NECK: Supple. Normal ROM. CHEST: Equal symmetric chest rise. LUNGS: Breathing is unlabored. Speaking comfortably in full sentences. EXTREMITIES: No deformities. Non-tender. SKIN: Warm and dry. No rash. This.   The patient does have little bit of fluid in his inferior portion of the dressing. NEUROLOGICAL: Normal speech and thought      DIAGNOSTIC RESULTS   LABS:   Labs Reviewed - No data to display   When ordered only abnormal lab results are displayed. All other labs were within normal range or not returned as of this dictation. RADIOLOGY:    Non-plain film images such as CT, Ultrasound and MRI are read by the radiologist. Plain radiographic images are visualized and preliminarily interpreted by the ED Provider with the below findings:      Interpretation per the Radiologist below, if available at the time of this note:  No results found. EMERGENCY DEPARTMENT COURSE and DIFFERENTIAL DIAGNOSIS/MDM:     Vitals:    Vitals:    01/21/23 1430 01/21/23 1700   BP: (!) 144/90 (!) 148/89   Pulse: 84 81   Resp: 16 16   Temp: 97.8 °F (36.6 °C)    TempSrc: Oral    SpO2: 93% 94%        Patient was given the following medications:   Medications - No data to display          CC/HPI Summary, DDx, ED Course, and Reassessment:     Patient was seen and evaluated. I contact the surgeon. We were able to gently press on the fluid and get it to come out the inferior portion of the dressing. Since addressing his silver impregnated we do not want to change that as we do not have the similar dressing here. After talking to the surgeon we put a little bit of Betadine on the skin on the inferior portion of the dressing and then placed a Tegaderm over top of that. He has follow-up in a few days with the surgeon. At this time I find no evidence of infection or hemorrhage or other postoperative concerns. CONSULTS: None   Social Determinants: None   Chronic Conditions: NA    Disposition Considerations: None      I am the primary physician of Record. FINAL IMPRESSION    1.  Encounter for postoperative wound check         DISPOSITION/PLAN   DISPOSITION Decision To Discharge 01/21/2023 05:05:15 PM       PATIENT REFERRED TO:   Brown Cooney [facility] [id number]  [phone]    Go in 3 days  as scheduled     DISCHARGE MEDICATIONS:   Discharge Medication List as of 1/21/2023  4:54 PM         DISCONTINUED MEDICATIONS:   Discharge Medication List as of 1/21/2023  4:54 PM               (Please note that portions of this note were completed with a voice recognition program.  Efforts were made to edit the dictations but occasionally words are mis-transcribed.)     Brett Barron MD (electronically signed)           Brett Barron MD  01/21/23 2020

## 2023-01-21 NOTE — ED NOTES
Dr. Luz Elena Maravilla is speaking with   with .       Ramonita Stokes  01/21/23 [facility]  01/21/23 0050

## 2023-01-23 ENCOUNTER — CARE COORDINATION (OUTPATIENT)
Dept: CARE COORDINATION | Age: 58
End: 2023-01-23

## 2023-01-23 NOTE — CARE COORDINATION
Ambulatory Care Coordination  ED Follow up Call    Reason for ED visit:  neck wound   Status:     slightly improved    Did you call your PCP prior to going to the ED? No      Did you receive a discharge instructions from the Emergency Room? Yes  Review of Instructions:     Understands what to report/when to return?:  Yes   Understands discharge instructions?:  Yes   Following discharge instructions?:  Yes   If not why? na    Are there any new complaints of pain? No  New Pain Meds? No    Constipation prophylaxis needed? N/A    If you have a wound is the dressing clean, dry, and intact? Yes  Understands wound care regimen? Yes    Are there any other complaints/concerns that you wish to tell your provider? Has an ER fu appt with PCP tomorrow. Reports the ER spoke with surgeon and they reinforced dressing and does have an appt with ortho surgeon 2/1. Reports fs has not been an issue. Reviewed a1c 6.2. FU appts/Provider:    Future Appointments   Date Time Provider Sloane Sherman   1/24/2023  9:40 AM MD Jericho Tellez   3/13/2023  9:40 AM MD Jericho Tellez  Kenneth  JOSÉ MIGUEL           New Medications?:   No      Medication Reconciliation by phone - No  Understands Medications? Yes  Taking Medications? Yes  Can you swallow your pills? Yes    Any further needs in the home i.e. Equipment? Not Applicable    Link to services in community?:  N/A   Which services:  na  Patient Excluded from Care Coordination?  Yes     The patient will be excluded from Care Coordination for the following reason: Patient has no identified needs

## 2023-01-24 ENCOUNTER — OFFICE VISIT (OUTPATIENT)
Dept: FAMILY MEDICINE CLINIC | Age: 58
End: 2023-01-24

## 2023-01-24 VITALS — HEART RATE: 82 BPM | OXYGEN SATURATION: 97 % | DIASTOLIC BLOOD PRESSURE: 90 MMHG | SYSTOLIC BLOOD PRESSURE: 152 MMHG

## 2023-01-24 DIAGNOSIS — I10 ESSENTIAL HYPERTENSION: ICD-10-CM

## 2023-01-24 DIAGNOSIS — M47.812 OSTEOARTHRITIS OF CERVICAL SPINE, UNSPECIFIED SPINAL OSTEOARTHRITIS COMPLICATION STATUS: ICD-10-CM

## 2023-01-24 DIAGNOSIS — L24.A9 WOUND DRAINAGE: Primary | ICD-10-CM

## 2023-01-24 DIAGNOSIS — E11.9 TYPE 2 DIABETES MELLITUS WITHOUT COMPLICATION, WITHOUT LONG-TERM CURRENT USE OF INSULIN (HCC): ICD-10-CM

## 2023-01-24 NOTE — PROGRESS NOTES
Post-Discharge Transitional Care  Follow Up      Levi Duff   YOB: 1965    Date of Office Visit:  1/24/2023  Date of Hospital Admission: 1/21/23  Date of Hospital Discharge: 1/21/23  Risk of hospital readmission (high >=14%. Medium >=10%) :No data recorded    Care management risk score Rising risk (score 2-5) and Complex Care (Scores >=6): No Risk Score On File     Non face to face  following discharge, date last encounter closed (first attempt may have been earlier): 01/18/2023    Call initiated 2 business days of discharge: Yes    ASSESSMENT/PLAN:   Wound drainage  Osteoarthritis of cervical spine, unspecified spinal osteoarthritis complication status  Essential hypertension  Type 2 diabetes mellitus without complication, without long-term current use of insulin ([facility] [address]  Reviewed hospital records from [facility] and also the ER visit. He was at the emergency room 4 days after surgery for drainage from the anterior surgical site. The dressing is completely soaked in serous fluid. It is removed although I know there was a desire to leave it in place. After removal of the dressing wound inspected carefully. No evidence of dehiscence. No erythema. No purulent drainage. There is mild to moderate amount of serous drainage. Surrounding lymphadenopathy. The wound was cleaned and dressing replaced. Is to follow-up with surgery in a week. Call sooner if needed. In addition patient complaining that the oxycodone at 1 every 6 hours is not lasting long enough he gets relief about 3 to 4 hours. I have asked him he could increase his Robaxin to 750 mg up to 4 times a day. I also asked him to call  to inquire if the use of ibuprofen 600 mg 3 times daily would be allowed. If so we could prescribe it. His blood pressure is mildly elevated we did not pursue today. Mention his A1c at  was 6.7. Medical Decision Making: high complexity  No follow-ups on file.            Subjective:   HPI:  Follow up of Hospital problems/diagnosis(es):     Inpatient course: Discharge summary reviewed- see chart. Interval history/Current status: Patient has surgery of his cervical spine at [facility] on 1/16/23. He was in the ER on 1/21 due to drainage     Patient Active Problem List   Diagnosis    Sprain and strain of unspecified site of knee and leg    Tear of medial meniscus of right knee, current    Primary localized osteoarthrosis, lower leg    Carpal tunnel syndrome on right    Erectile dysfunction    Obesity (BMI 30-39. 9)    Herniated disc    Mixed hyperlipidemia    Primary osteoarthritis of right knee    Family history of ASCVD (arteriosclerotic cardiovascular disease)    Effusion of left knee    Acute medial meniscus tear of left knee    Acute pain of right knee    Closed fracture of tibial plateau with delayed healing    Closed fracture of medial plateau of left tibia    Cardiovascular risk factor    Type 2 diabetes mellitus without complication, without long-term current use of insulin (HCC)    Post-nasal discharge    Primary osteoarthritis of left knee    Memory loss    Carpal tunnel syndrome on left    Essential hypertension    Anxiety    Infected sebaceous cyst    Cervical spondylosis without myelopathy    Cervical strain, subsequent encounter    Lipoma of neck  painful       Medications listed as ordered at the time of discharge from hospital     Medication List            Accurate as of January 24, 2023  9:43 AM. If you have any questions, ask your nurse or doctor. CHANGE how you take these medications      losartan 25 MG tablet  Commonly known as: COZAAR  TAKE ONE TABLET BY MOUTH DAILY  What changed: See the new instructions.             CONTINUE taking these medications      aspirin 81 MG tablet     CLOMIPHENE CITRATE PO     escitalopram 10 MG tablet  Commonly known as: LEXAPRO  TAKE ONE TABLET BY MOUTH DAILY     folic acid 1 MG tablet  Commonly known as: FOLVITE     glimepiride 1 MG tablet  Commonly known as: AMARYL  TAKE ONE TABLET BY MOUTH EVERY MORNING BEFORE BREAKFAST     nabumetone 500 MG tablet  Commonly known as: RELAFEN  Take 1 tablet by mouth 2 times daily as needed for Pain     promethazine 25 MG tablet  Commonly known as: PHENERGAN  TAKE ONE TABLET BY MOUTH EVERY 6 HOURS AS NEEDED FOR VERTIGO OR NAUSEA     rosuvastatin 10 MG tablet  Commonly known as: CRESTOR  Take 1 tablet by mouth daily     tadalafil 5 MG tablet  Commonly known as: CIALIS     traMADol 50 MG tablet  Commonly known as: ULTRAM                Medications marked \"taking\" at this time  Outpatient Medications Marked as Taking for the 1/24/23 encounter (Office Visit) with Bernarda Gonzalez MD   Medication Sig Dispense Refill    losartan (COZAAR) 25 MG tablet TAKE ONE TABLET BY MOUTH DAILY (Patient taking differently: 50 mg daily TAKE ONE TABLET BY MOUTH DAILY) 90 tablet 3    escitalopram (LEXAPRO) 10 MG tablet TAKE ONE TABLET BY MOUTH DAILY 90 tablet 3    nabumetone (RELAFEN) 500 MG tablet Take 1 tablet by mouth 2 times daily as needed for Pain 60 tablet 0    rosuvastatin (CRESTOR) 10 MG tablet Take 1 tablet by mouth daily 30 tablet 5    glimepiride (AMARYL) 1 MG tablet TAKE ONE TABLET BY MOUTH EVERY MORNING BEFORE BREAKFAST 30 tablet 11    promethazine (PHENERGAN) 25 MG tablet TAKE ONE TABLET BY MOUTH EVERY 6 HOURS AS NEEDED FOR VERTIGO OR NAUSEA 20 tablet 0    folic acid (FOLVITE) 1 MG tablet Take 1 mg by mouth daily      CLOMIPHENE CITRATE PO Take by mouth From Dr. Lonnie Thornton      tadalafil (CIALIS) 5 MG tablet Take 5 mg by mouth as needed for Erectile Dysfunction. traMADol (ULTRAM) 50 MG tablet Take 50 mg by mouth every 6 hours as needed. aspirin 81 MG tablet Take 81 mg by mouth daily. Medications patient taking as of now reconciled against medications ordered at time of hospital discharge: Yes    A comprehensive review of systems was negative except for what was noted in the HPI.     Objective: BP (!) 152/90   Pulse 82   SpO2 97%   Physical Exam  Vitals and nursing note reviewed. Constitutional:       General: He is not in acute distress. Appearance: He is well-developed. He is not diaphoretic. HENT:      Head: Normocephalic and atraumatic. Right Ear: External ear normal.      Left Ear: External ear normal.      Nose: Nose normal.   Eyes:      General: Lids are normal. No scleral icterus. Right eye: No discharge. Left eye: No discharge. Pupils: Pupils are equal, round, and reactive to light. Neck:      Thyroid: No thyromegaly. Vascular: No JVD. Cardiovascular:      Rate and Rhythm: Normal rate and regular rhythm. Pulmonary:      Effort: Pulmonary effort is normal. No respiratory distress. Abdominal:      Palpations: Abdomen is soft. There is no hepatomegaly or splenomegaly. Tenderness: There is no abdominal tenderness. Skin:     General: Skin is warm and dry. Coloration: Skin is not pale. Findings: No erythema or rash. Comments: Turgor normal   Psychiatric:         Behavior: Behavior normal.         Thought Content: Thought content normal.         Judgment: Judgment normal.          An electronic signature was used to authenticate this note. --Amanda Antunez RN        I, Dr. Saurabh Iyer, personally performed the services described in this documentation, as scribed by the above signed scribe in my presence, and it is both accurate and complete. I agree with the ROS and Past Histories independently gathered by the clinical support staff and the remaining scribed note accurately describes my personal service to the patient.       1/24/2023    11:06 AM

## 2023-03-07 DIAGNOSIS — E78.2 MIXED HYPERLIPIDEMIA: ICD-10-CM

## 2023-03-07 RX ORDER — ROSUVASTATIN CALCIUM 10 MG/1
TABLET, COATED ORAL
Qty: 30 TABLET | Refills: 5 | Status: SHIPPED | OUTPATIENT
Start: 2023-03-07

## 2023-03-13 ENCOUNTER — OFFICE VISIT (OUTPATIENT)
Dept: FAMILY MEDICINE CLINIC | Age: 58
End: 2023-03-13
Payer: COMMERCIAL

## 2023-03-13 ENCOUNTER — TELEPHONE (OUTPATIENT)
Dept: ADMINISTRATIVE | Age: 58
End: 2023-03-13

## 2023-03-13 VITALS
HEART RATE: 93 BPM | DIASTOLIC BLOOD PRESSURE: 80 MMHG | BODY MASS INDEX: 36.15 KG/M2 | OXYGEN SATURATION: 94 % | WEIGHT: 274 LBS | SYSTOLIC BLOOD PRESSURE: 138 MMHG

## 2023-03-13 DIAGNOSIS — F41.9 ANXIETY: ICD-10-CM

## 2023-03-13 DIAGNOSIS — I10 ESSENTIAL HYPERTENSION: Primary | ICD-10-CM

## 2023-03-13 DIAGNOSIS — Z12.5 SCREENING FOR PROSTATE CANCER: ICD-10-CM

## 2023-03-13 DIAGNOSIS — M17.11 PRIMARY OSTEOARTHRITIS OF RIGHT KNEE: ICD-10-CM

## 2023-03-13 DIAGNOSIS — K21.00 GASTROESOPHAGEAL REFLUX DISEASE WITH ESOPHAGITIS WITHOUT HEMORRHAGE: ICD-10-CM

## 2023-03-13 DIAGNOSIS — R13.19 ESOPHAGEAL DYSPHAGIA: ICD-10-CM

## 2023-03-13 DIAGNOSIS — E11.9 TYPE 2 DIABETES MELLITUS WITHOUT COMPLICATION, WITHOUT LONG-TERM CURRENT USE OF INSULIN (HCC): ICD-10-CM

## 2023-03-13 DIAGNOSIS — E55.9 VITAMIN D DEFICIENCY: ICD-10-CM

## 2023-03-13 DIAGNOSIS — E78.2 MIXED HYPERLIPIDEMIA: ICD-10-CM

## 2023-03-13 LAB
A/G RATIO: 1.5 (ref 1.1–2.2)
ALBUMIN SERPL-MCNC: 4.2 G/DL (ref 3.4–5)
ALP BLD-CCNC: 68 U/L (ref 40–129)
ALT SERPL-CCNC: 18 U/L (ref 10–40)
ANION GAP SERPL CALCULATED.3IONS-SCNC: 13 MMOL/L (ref 3–16)
AST SERPL-CCNC: 16 U/L (ref 15–37)
ATYPICAL LYMPHOCYTE RELATIVE PERCENT: 2 % (ref 0–6)
BANDED NEUTROPHILS RELATIVE PERCENT: 20 % (ref 0–7)
BASOPHILS ABSOLUTE: 0 K/UL (ref 0–0.2)
BASOPHILS RELATIVE PERCENT: 0 %
BILIRUB SERPL-MCNC: 0.5 MG/DL (ref 0–1)
BUN BLDV-MCNC: 13 MG/DL (ref 7–20)
CALCIUM SERPL-MCNC: 9.5 MG/DL (ref 8.3–10.6)
CHLORIDE BLD-SCNC: 106 MMOL/L (ref 99–110)
CHOLESTEROL, TOTAL: 151 MG/DL (ref 0–199)
CO2: 24 MMOL/L (ref 21–32)
CREAT SERPL-MCNC: 0.8 MG/DL (ref 0.9–1.3)
CREATININE URINE: 184.9 MG/DL (ref 39–259)
EOSINOPHILS ABSOLUTE: 0 K/UL (ref 0–0.6)
EOSINOPHILS RELATIVE PERCENT: 0 %
GFR SERPL CREATININE-BSD FRML MDRD: >60 ML/MIN/{1.73_M2}
GLUCOSE BLD-MCNC: 144 MG/DL (ref 70–99)
HCT VFR BLD CALC: 46.8 % (ref 40.5–52.5)
HDLC SERPL-MCNC: 37 MG/DL (ref 40–60)
HEMOGLOBIN: 15.8 G/DL (ref 13.5–17.5)
LDL CHOLESTEROL CALCULATED: 88 MG/DL
LYMPHOCYTES ABSOLUTE: 1.6 K/UL (ref 1–5.1)
LYMPHOCYTES RELATIVE PERCENT: 18 %
MCH RBC QN AUTO: 29.1 PG (ref 26–34)
MCHC RBC AUTO-ENTMCNC: 33.7 G/DL (ref 31–36)
MCV RBC AUTO: 86.5 FL (ref 80–100)
MICROALBUMIN UR-MCNC: <1.2 MG/DL
MICROALBUMIN/CREAT UR-RTO: NORMAL MG/G (ref 0–30)
MONOCYTES ABSOLUTE: 0.6 K/UL (ref 0–1.3)
MONOCYTES RELATIVE PERCENT: 8 %
NEUTROPHILS ABSOLUTE: 5.8 K/UL (ref 1.7–7.7)
NEUTROPHILS RELATIVE PERCENT: 52 %
PDW BLD-RTO: 14.8 % (ref 12.4–15.4)
PLATELET # BLD: 200 K/UL (ref 135–450)
PLATELET SLIDE REVIEW: ADEQUATE
PMV BLD AUTO: 8.3 FL (ref 5–10.5)
POTASSIUM SERPL-SCNC: 4.5 MMOL/L (ref 3.5–5.1)
PROSTATE SPECIFIC ANTIGEN: 0.37 NG/ML (ref 0–4)
RBC # BLD: 5.41 M/UL (ref 4.2–5.9)
SLIDE REVIEW: ABNORMAL
SODIUM BLD-SCNC: 143 MMOL/L (ref 136–145)
TOTAL PROTEIN: 7 G/DL (ref 6.4–8.2)
TRIGL SERPL-MCNC: 129 MG/DL (ref 0–150)
VITAMIN D 25-HYDROXY: 19.7 NG/ML
VLDLC SERPL CALC-MCNC: 26 MG/DL
WBC # BLD: 8 K/UL (ref 4–11)

## 2023-03-13 PROCEDURE — 3079F DIAST BP 80-89 MM HG: CPT | Performed by: FAMILY MEDICINE

## 2023-03-13 PROCEDURE — 99214 OFFICE O/P EST MOD 30 MIN: CPT | Performed by: FAMILY MEDICINE

## 2023-03-13 PROCEDURE — 3075F SYST BP GE 130 - 139MM HG: CPT | Performed by: FAMILY MEDICINE

## 2023-03-13 RX ORDER — PANTOPRAZOLE SODIUM 40 MG/1
40 TABLET, DELAYED RELEASE ORAL
Qty: 90 TABLET | Refills: 3 | Status: SHIPPED | OUTPATIENT
Start: 2023-03-13

## 2023-03-13 RX ORDER — NABUMETONE 500 MG/1
500 TABLET, FILM COATED ORAL 2 TIMES DAILY PRN
Qty: 180 TABLET | Refills: 3 | Status: SHIPPED | OUTPATIENT
Start: 2023-03-13

## 2023-03-13 RX ORDER — GLIMEPIRIDE 1 MG/1
TABLET ORAL
Qty: 90 TABLET | Refills: 3 | Status: SHIPPED | OUTPATIENT
Start: 2023-03-13

## 2023-03-13 RX ORDER — PANTOPRAZOLE SODIUM 40 MG/1
40 TABLET, DELAYED RELEASE ORAL
Qty: 30 TABLET | Refills: 1 | Status: SHIPPED | OUTPATIENT
Start: 2023-03-13 | End: 2023-03-13 | Stop reason: SDUPTHER

## 2023-03-13 RX ORDER — ROSUVASTATIN CALCIUM 10 MG/1
TABLET, COATED ORAL
Qty: 90 TABLET | Refills: 3 | Status: SHIPPED | OUTPATIENT
Start: 2023-03-13

## 2023-03-13 SDOH — ECONOMIC STABILITY: FOOD INSECURITY: WITHIN THE PAST 12 MONTHS, YOU WORRIED THAT YOUR FOOD WOULD RUN OUT BEFORE YOU GOT MONEY TO BUY MORE.: NEVER TRUE

## 2023-03-13 SDOH — ECONOMIC STABILITY: INCOME INSECURITY: HOW HARD IS IT FOR YOU TO PAY FOR THE VERY BASICS LIKE FOOD, HOUSING, MEDICAL CARE, AND HEATING?: NOT HARD AT ALL

## 2023-03-13 SDOH — ECONOMIC STABILITY: HOUSING INSECURITY
IN THE LAST 12 MONTHS, WAS THERE A TIME WHEN YOU DID NOT HAVE A STEADY PLACE TO SLEEP OR SLEPT IN A SHELTER (INCLUDING NOW)?: NO

## 2023-03-13 SDOH — ECONOMIC STABILITY: FOOD INSECURITY: WITHIN THE PAST 12 MONTHS, THE FOOD YOU BOUGHT JUST DIDN'T LAST AND YOU DIDN'T HAVE MONEY TO GET MORE.: NEVER TRUE

## 2023-03-13 NOTE — PROGRESS NOTES
Chief Complaint   Patient presents with    Hypertension    Diabetes        Internal Administration   First Dose      Second Dose           Last COVID Lab SARS-CoV-2 Antibody, Total (no units)   Date Value   2021 Negative             Wt Readings from Last 3 Encounters:   23 274 lb (124.3 kg)   23 274 lb (124.3 kg)   22 269 lb (122 kg)     BP Readings from Last 3 Encounters:   23 138/80   23 (!) 152/90   23 (!) 148/89      Lab Results   Component Value Date    LABA1C 6.2 2022    LABA1C 6.3 2022    LABA1C 7.0 2021       HPI:  Ashley Vaz is a 60 y.o. (: 1965) here today for    Patient states that he continues to follow up with UC for his neck. His incision site has healed up very well. There is still some slight swelling around the site. He continues to wear his stephenson brace. He states he also continues to see ortho fir his knee pain and for some of his shoulder pain. States that his mood continues to do well on his current medications. He states that he has been having issues with swallowing since his surgery. His throat is red. Asked if he is having any acid reflux. He denies any. States when he eats he finds food gets stuck then he has to cough and get it back up. Discussed acid reflux possible causing this and that it could be silent. Explained that bread could cause it to be worse, he states that he does have issues with bread getting stuck. [] Patient has completed an advance directive  [x] Patient has NOT completed an advanced directive  [] Patient has a documented healthcare surrogate  [x] Patient does NOT have a documented healthcare surrogate  [] Discussed the importance of establishing and updating an advanced directive. Patient has questions at this time and those were answered. [x] Discussed the importance of establishing and updating an advanced directive. Patient does NOT have questions at this time.     Discussed with: [x] Patient            [] Family             [] Other caregiver    Patient's medications, allergies, past medical, surgical, social and family histories were reviewed and updated asappropriate on 3/13/2023 at 9:48 AM.    ROS:  Review of Systems    All other systems reviewed and are negative except as noted above on 3/13/2023 at 9:48 AM. Additional review of systems may be scanned into the media section ofthis medical record. Any responses requiring further intervention were pursued.     Past Medical History:   Diagnosis Date    Carpal tunnel syndrome 10/24/2013    Herniated disc     Hypertension     PONV (postoperative nausea and vomiting)     Primary localized osteoarthrosis, lower leg 10/17/2013     Family History   Problem Relation Age of Onset    Early Death Father     Heart Disease Father     Cancer Maternal Aunt     Cancer Maternal Grandmother      Social History     Socioeconomic History    Marital status: Single     Spouse name: Not on file    Number of children: Not on file    Years of education: Not on file    Highest education level: Not on file   Occupational History    Not on file   Tobacco Use    Smoking status: Former     Packs/day: 1.00     Years: 6.00     Pack years: 6.00     Types: Cigarettes     Quit date: 1999     Years since quittin.1    Smokeless tobacco: Never    Tobacco comments:     Patient does not know how many packs per day, he has not smoked in ovver 20 years   Vaping Use    Vaping Use: Never used   Substance and Sexual Activity    Alcohol use: Yes     Comment: rarely    Drug use: No    Sexual activity: Yes     Partners: Female   Other Topics Concern    Not on file   Social History Narrative    ** Merged History Encounter **          Social Determinants of Health     Financial Resource Strain: Low Risk     Difficulty of Paying Living Expenses: Not hard at all   Food Insecurity: No Food Insecurity    Worried About Running Out of Food in the Last Year: Never true    Ran Out of Food in the Last Year: Never true   Transportation Needs: Unknown    Lack of Transportation (Medical): Not on file    Lack of Transportation (Non-Medical): No   Physical Activity: Not on file   Stress: Not on file   Social Connections: Not on file   Intimate Partner Violence: Not on file   Housing Stability: Unknown    Unable to Pay for Housing in the Last Year: Not on file    Number of Places Lived in the Last Year: Not on file    Unstable Housing in the Last Year: No     Prior to Visit Medications    Medication Sig Taking? Authorizing Provider   rosuvastatin (CRESTOR) 10 MG tablet TAKE ONE TABLET BY MOUTH DAILY Yes Elza Vaz MD   losartan (COZAAR) 25 MG tablet TAKE ONE TABLET BY MOUTH DAILY  Patient taking differently: 50 mg daily TAKE ONE TABLET BY MOUTH DAILY Yes Elza Vaz MD   escitalopram (LEXAPRO) 10 MG tablet TAKE ONE TABLET BY MOUTH DAILY Yes Elza Vaz MD   nabumetone (RELAFEN) 500 MG tablet Take 1 tablet by mouth 2 times daily as needed for Pain Yes VIOLETTA Shelton   glimepiride (AMARYL) 1 MG tablet TAKE ONE TABLET BY MOUTH EVERY MORNING BEFORE BREAKFAST Yes Elza Vaz MD   promethazine (PHENERGAN) 25 MG tablet TAKE ONE TABLET BY MOUTH EVERY 6 HOURS AS NEEDED FOR VERTIGO OR NAUSEA Yes SUGAR Alaniz CNP   folic acid (FOLVITE) 1 MG tablet Take 1 mg by mouth daily Yes Historical Provider, MD   CLOMIPHENE CITRATE PO Take by mouth From Dr. Benjamin Guevara Yes Historical Provider, MD   tadalafil (CIALIS) 5 MG tablet Take 5 mg by mouth as needed for Erectile Dysfunction. Yes Historical Provider, MD   traMADol (ULTRAM) 50 MG tablet Take 50 mg by mouth every 6 hours as needed. Yes Historical Provider, MD   aspirin 81 MG tablet Take 81 mg by mouth daily.  Yes Historical Provider, MD     Allergies   Allergen Reactions    Dye [Iodides]     Iodine Swelling    Penicillins Rash       OBJECTIVE:  Estimated body mass index is 36.15 kg/m² as calculated from the following:    Height as of 1/6/23: 6' 1\" (1.854 m). Weight as of this encounter: 274 lb (124.3 kg). Vitals:    03/13/23 0933   BP: 138/80   Pulse: 93   SpO2: 94%   Weight: 274 lb (124.3 kg)       Physical Exam  Vitals and nursing note reviewed. Constitutional:       General: He is not in acute distress. Appearance: He is well-developed. He is not diaphoretic. HENT:      Head: Normocephalic and atraumatic. Comments: Scar over the left side of neck has healed greatly      Right Ear: External ear normal.      Left Ear: External ear normal.      Nose: Nose normal.   Eyes:      General: Lids are normal. No scleral icterus. Right eye: No discharge. Left eye: No discharge. Pupils: Pupils are equal, round, and reactive to light. Neck:      Thyroid: No thyromegaly. Vascular: No JVD. Cardiovascular:      Rate and Rhythm: Normal rate and regular rhythm. Heart sounds: Normal heart sounds. Pulmonary:      Effort: Pulmonary effort is normal. No respiratory distress. Breath sounds: Normal breath sounds. Abdominal:      Palpations: Abdomen is soft. There is no hepatomegaly or splenomegaly. Tenderness: There is no abdominal tenderness. Musculoskeletal:      Right lower leg: No edema. Left lower leg: No edema. Skin:     General: Skin is warm and dry. Coloration: Skin is not pale. Findings: No erythema or rash. Comments: Turgor normal   Neurological:      Mental Status: He is oriented to person, place, and time. Psychiatric:         Mood and Affect: Mood normal.         Behavior: Behavior normal.         Thought Content: Thought content normal.         Judgment: Judgment normal.            ASSESSMENT PLAN      Diagnosis Orders   1. Essential hypertension  CBC with Auto Differential    Comprehensive Metabolic Panel      2.  Type 2 diabetes mellitus without complication, without long-term current use of insulin (HCC)  Hemoglobin A1C    MICROALBUMIN / CREATININE URINE RATIO    glimepiride (AMARYL) 1 MG tablet      3. Primary osteoarthritis of right knee        4. Mixed hyperlipidemia  LIPID PANEL    rosuvastatin (CRESTOR) 10 MG tablet      5. Anxiety        6. Screening for prostate cancer  PSA Screening      7. Vitamin D deficiency  Vitamin D 25 Hydroxy      8. Gastroesophageal reflux disease with esophagitis without hemorrhage  pantoprazole (PROTONIX) 40 MG tablet    DISCONTINUED: pantoprazole (PROTONIX) 40 MG tablet      9. Esophageal dysphagia  pantoprazole (PROTONIX) 40 MG tablet    DISCONTINUED: pantoprazole (PROTONIX) 40 MG tablet      Patient states that all electronic devices check his blood pressure high but manual is fine. I asked him to check on cuff size. Blood sugar readings by glycosylated hemoglobin or home fingerstick blood sugars are acceptable and no changes in diabetic medication as listed in the medication list are necessary. Lipids will be monitored based upon levels requiring treatment and other cardiac risks. Medications for hyperlipidemia and hypertriglyceridemia as listed on the medication list will be changed as necessary to reach control parameters. Nerves are doing well. Vitamin D levels will be monitored as needed and changes to medications related to vitamin D as listed in the medication list will be changed to maintain parameters as needed. He mentions difficulty swallowing since the surgery thought it was something related to the surgery sounds more like acid reflux and esophageal dysphagia. Start pantoprazole for 2 months then discontinue. I told him this may need to be treated in the future. The neck incisions are healing nicely no further drainage from the larger incision. Follow-up 6 months    Patient should call the office immediately with new or ongoing signs or symptoms or worsening, or proceed to the emergency room.   No changes in past medical history, past surgical history, social history, or family history were noted during the patient encounter unless specifically listed above. All updates of past medical history, past surgical history, social history, or family history were reviewed personally by me during the office visit. All problems listed in the assessment are stable unless noted otherwise. Medication profile reviewed personally by me during the visit. Medication side effects and possible impairments from medications were discussed as applicable. Unless stated otherwise, we will continue current medications as listed in the medication review report contained in the patient's medical record. This document was prepared by a combination of typing and transcription through a voice recognition software. Scribe attestation: Clive Jernigan RN, am scribing for and in the presence of Shanae Dexter MD. Electronically signed by Evelia Doshi RN on 3/13/2023 at 9:48 AM      Provider attestation:     I, Dr. Jeanette Eckert, personally performed the services described in this documentation, as scribed by the above signed scribe in my presence, and it is both accurate and complete. I agree with the ROS and Past Histories independently gathered by the clinical support staff and the remaining scribed note accurately describes my personal service to the patient.       3/13/2023    10:02 AM

## 2023-03-14 LAB
EST. AVERAGE GLUCOSE BLD GHB EST-MCNC: 134.1 MG/DL
HBA1C MFR BLD: 6.3 %

## 2023-03-23 ENCOUNTER — OFFICE VISIT (OUTPATIENT)
Dept: ORTHOPEDIC SURGERY | Age: 58
End: 2023-03-23

## 2023-03-23 VITALS — WEIGHT: 274 LBS | BODY MASS INDEX: 36.31 KG/M2 | HEIGHT: 73 IN

## 2023-03-23 DIAGNOSIS — G56.01 CARPAL TUNNEL SYNDROME ON RIGHT: Primary | ICD-10-CM

## 2023-03-23 RX ORDER — OXYCODONE HYDROCHLORIDE 5 MG/1
TABLET ORAL
COMMUNITY
Start: 2023-01-18

## 2023-03-23 NOTE — PROGRESS NOTES
Returns today for evaluation. His right hand is doing fairly well. He still some tingling in that and also the other hand but is recovering from anterior cervical discectomy with fusion for work-related issue. I told him we should probably wait things out as far as his neck goes before considering any other testing. He should return in 6 months.

## 2023-03-29 ENCOUNTER — OFFICE VISIT (OUTPATIENT)
Dept: ORTHOPEDIC SURGERY | Age: 58
End: 2023-03-29
Payer: COMMERCIAL

## 2023-03-29 VITALS — WEIGHT: 274 LBS | BODY MASS INDEX: 36.31 KG/M2 | HEIGHT: 73 IN

## 2023-03-29 DIAGNOSIS — M17.11 PRIMARY OSTEOARTHRITIS OF RIGHT KNEE: Primary | ICD-10-CM

## 2023-03-29 PROCEDURE — 99212 OFFICE O/P EST SF 10 MIN: CPT | Performed by: ORTHOPAEDIC SURGERY

## 2023-03-29 NOTE — PROGRESS NOTES
Interested for evaluation of his right knee. He still has generalized pain consistent with osteoarthritis in the knee which has not changed. His activity is somewhat decreased at this time because of another issue related to his neck but on exam to his right knee today he has 0 to 115 degrees range of motion, 1+ effusion, palpable spurs medially and pseudo ligamentous laxity medially consistent with osteoarthritis. His neurocirculatory lymphatic exam appears normal symmetric both lower extremities. We went over job restrictions and will continue with the same restrictions at this time. Ultimately he will benefit from knee replacement surgery at some point in the future.

## 2023-03-30 ENCOUNTER — OFFICE VISIT (OUTPATIENT)
Dept: ORTHOPEDIC SURGERY | Age: 58
End: 2023-03-30

## 2023-03-30 VITALS — HEIGHT: 73 IN | BODY MASS INDEX: 36.31 KG/M2 | WEIGHT: 274 LBS

## 2023-03-30 DIAGNOSIS — M47.812 CERVICAL SPONDYLOSIS WITHOUT MYELOPATHY: Primary | ICD-10-CM

## 2023-03-30 NOTE — PROGRESS NOTES
He returns today for evaluation of his neck. He is recovering from a cervical spine surgery that was done by another practitioner. Apparently has a few more operations he needs to go through with that and he is not sure when he would like to have that done. At this time he still has tingling in both arms which could either be carpal tunnel or cervical radiculopathy but I told him I would not want to rush into EMG nerve conduction testing at this time but left the neck issue recover completely prior to reevaluating that.   He appears comfortable with those recommendations will proceed accordingly

## 2023-05-08 ENCOUNTER — TELEPHONE (OUTPATIENT)
Dept: ORTHOPEDIC SURGERY | Age: 58
End: 2023-05-08

## 2023-08-28 ENCOUNTER — OFFICE VISIT (OUTPATIENT)
Dept: FAMILY MEDICINE CLINIC | Age: 58
End: 2023-08-28
Payer: COMMERCIAL

## 2023-08-28 VITALS
OXYGEN SATURATION: 91 % | WEIGHT: 254 LBS | BODY MASS INDEX: 33.51 KG/M2 | DIASTOLIC BLOOD PRESSURE: 79 MMHG | SYSTOLIC BLOOD PRESSURE: 121 MMHG | HEART RATE: 74 BPM

## 2023-08-28 DIAGNOSIS — N52.9 ERECTILE DYSFUNCTION, UNSPECIFIED ERECTILE DYSFUNCTION TYPE: ICD-10-CM

## 2023-08-28 DIAGNOSIS — M47.812 CERVICAL SPONDYLOSIS WITHOUT MYELOPATHY: ICD-10-CM

## 2023-08-28 DIAGNOSIS — E78.2 MIXED HYPERLIPIDEMIA: ICD-10-CM

## 2023-08-28 DIAGNOSIS — I10 ESSENTIAL HYPERTENSION: Primary | ICD-10-CM

## 2023-08-28 DIAGNOSIS — H61.23 BILATERAL IMPACTED CERUMEN: ICD-10-CM

## 2023-08-28 DIAGNOSIS — E11.9 TYPE 2 DIABETES MELLITUS WITHOUT COMPLICATION, WITHOUT LONG-TERM CURRENT USE OF INSULIN (HCC): ICD-10-CM

## 2023-08-28 DIAGNOSIS — K21.00 GASTROESOPHAGEAL REFLUX DISEASE WITH ESOPHAGITIS WITHOUT HEMORRHAGE: ICD-10-CM

## 2023-08-28 PROCEDURE — 3078F DIAST BP <80 MM HG: CPT | Performed by: FAMILY MEDICINE

## 2023-08-28 PROCEDURE — 3074F SYST BP LT 130 MM HG: CPT | Performed by: FAMILY MEDICINE

## 2023-08-28 PROCEDURE — 99214 OFFICE O/P EST MOD 30 MIN: CPT | Performed by: FAMILY MEDICINE

## 2023-08-28 PROCEDURE — 69209 REMOVE IMPACTED EAR WAX UNI: CPT | Performed by: FAMILY MEDICINE

## 2023-08-28 PROCEDURE — 3044F HG A1C LEVEL LT 7.0%: CPT | Performed by: FAMILY MEDICINE

## 2023-08-29 LAB
EST. AVERAGE GLUCOSE BLD GHB EST-MCNC: 125.5 MG/DL
HBA1C MFR BLD: 6 %

## 2023-08-30 LAB
SHBG SERPL-SCNC: 39 NMOL/L (ref 11–80)
TESTOST FREE SERPL-MCNC: 132.6 PG/ML (ref 47–244)
TESTOST SERPL-MCNC: 665 NG/DL (ref 220–1000)

## 2023-10-04 ENCOUNTER — OFFICE VISIT (OUTPATIENT)
Dept: ORTHOPEDIC SURGERY | Age: 58
End: 2023-10-04

## 2023-10-04 VITALS — HEIGHT: 73 IN | BODY MASS INDEX: 33.66 KG/M2 | WEIGHT: 254 LBS

## 2023-10-04 DIAGNOSIS — M17.11 PRIMARY OSTEOARTHRITIS OF RIGHT KNEE: Primary | ICD-10-CM

## 2023-10-04 NOTE — PROGRESS NOTES
Returns today for evaluation. States his right knee is becoming progressively severe and painful to the point where he has had more difficulty in performing the tasks of the job even in spite of the restrictions that he is on. At this time he is interested in what was a prior recommendation of knee replacement surgery. Because of this I would recommend he be referred to Dr. Valinda Riedel for consideration of right total knee replacement surgery. It is a Worker's Comp. issue so we will need to request that Dr Theresa Dooley being made a consultant, and wants total knee replacement surgery is recommended and has been approved that he can proceed with that procedure.

## 2023-10-05 ENCOUNTER — OFFICE VISIT (OUTPATIENT)
Dept: ORTHOPEDIC SURGERY | Age: 58
End: 2023-10-05

## 2023-10-05 VITALS — WEIGHT: 254 LBS | HEIGHT: 73 IN | BODY MASS INDEX: 33.66 KG/M2

## 2023-10-05 DIAGNOSIS — M47.812 CERVICAL SPONDYLOSIS WITHOUT MYELOPATHY: Primary | ICD-10-CM

## 2023-10-05 NOTE — PROGRESS NOTES
Returns today for reevaluation of his neck. He is still improving but occasionally when he turns his neck a certain way and some pain in the right upper chest and right rib lateral flank.   At this time would recommend continued rehabilitation job restriction and return in 6 months

## 2023-10-06 ENCOUNTER — TELEPHONE (OUTPATIENT)
Dept: ORTHOPEDIC SURGERY | Age: 58
End: 2023-10-06

## 2023-11-07 ENCOUNTER — OFFICE VISIT (OUTPATIENT)
Dept: ORTHOPEDIC SURGERY | Age: 58
End: 2023-11-07

## 2023-11-07 VITALS — HEIGHT: 73 IN | WEIGHT: 254 LBS | BODY MASS INDEX: 33.66 KG/M2

## 2023-11-07 DIAGNOSIS — M77.11 LATERAL EPICONDYLITIS OF RIGHT ELBOW: Primary | ICD-10-CM

## 2023-11-07 NOTE — PROGRESS NOTES
epicondylitis    Impression:  Encounter Diagnosis   Name Primary? Lateral epicondylitis of right elbow Yes       Office Procedures:  Orders Placed This Encounter   Procedures    Breg Tennis Elbow Strap $20     Patient was supplied a Breg Tennis Elbow Strap. This retail item was supplied to provide functional support and assist in protecting the affected area. Verbal and written instructions for the use of and application of this item were provided. The patient was educated and fit by a healthcare professional with expert knowledge and specialization in brace application. They were instructed to contact the office immediately should the equipment result in increased pain, decreased sensation, increased swelling or worsening of the condition. Plan:     The patient does not seem to have any recurrence of his carpal tunnel. His hand strength and sensation is good. He appears to be suffering from tennis elbow. I will give the patient a home program for stretches and strengthening. I will also get the patient a counterforce brace to wear. He can follow-up with myself or Dr. Raciel Greco when he returns in January. Alexa Mcneil is in agreement with this plan. All questions were answered to patient's satisfaction and was encouraged to call with any further questions. Brad Davison, DO  Orthopedic Surgery and Sports Medicine  11/7/2023      This dictation was performed with a verbal recognition program [facility]) and it was checked for errors. It is possible that there are still dictated errors within this office note. If so, please bring any errors to my attention for an addendum. All efforts were made to ensure that this office note is accurate.

## 2023-12-31 DIAGNOSIS — I10 UNCONTROLLED HYPERTENSION: ICD-10-CM

## 2023-12-31 DIAGNOSIS — I10 ESSENTIAL HYPERTENSION: ICD-10-CM

## 2024-01-02 RX ORDER — ESCITALOPRAM OXALATE 10 MG/1
TABLET ORAL
Qty: 90 TABLET | Refills: 3 | Status: SHIPPED | OUTPATIENT
Start: 2024-01-02

## 2024-01-02 RX ORDER — LOSARTAN POTASSIUM 25 MG/1
TABLET ORAL
Qty: 90 TABLET | Refills: 3 | Status: SHIPPED | OUTPATIENT
Start: 2024-01-02

## 2024-03-12 ENCOUNTER — OFFICE VISIT (OUTPATIENT)
Dept: ORTHOPEDIC SURGERY | Age: 59
End: 2024-03-12

## 2024-03-12 VITALS — HEIGHT: 73 IN | BODY MASS INDEX: 33.66 KG/M2 | WEIGHT: 254 LBS

## 2024-03-12 DIAGNOSIS — M17.11 PRIMARY OSTEOARTHRITIS OF RIGHT KNEE: Primary | ICD-10-CM

## 2024-03-12 NOTE — PROGRESS NOTES
Sexual activity: Yes     Partners: Female   Other Topics Concern    Not on file   Social History Narrative    ** Merged History Encounter **          Social Determinants of Health     Financial Resource Strain: Low Risk  (3/13/2023)    Overall Financial Resource Strain (CARDIA)     Difficulty of Paying Living Expenses: Not hard at all   Food Insecurity: Not on file (3/13/2023)   Transportation Needs: Unknown (3/13/2023)    PRAPARE - Transportation     Lack of Transportation (Medical): Not on file     Lack of Transportation (Non-Medical): No   Physical Activity: Not on file   Stress: Not on file   Social Connections: Not on file   Intimate Partner Violence: Not on file   Housing Stability: Unknown (3/13/2023)    Housing Stability Vital Sign     Unable to Pay for Housing in the Last Year: Not on file     Number of Places Lived in the Last Year: Not on file     Unstable Housing in the Last Year: No       Family HISTORY    Family History   Problem Relation Age of Onset    Early Death Father     Heart Disease Father     Cancer Maternal Aunt     Cancer Maternal Grandmother        PHYSICAL EXAM    Vital Signs:  Ht 1.854 m (6' 1\")   Wt 115.2 kg (254 lb)   BMI 33.51 kg/m²   General Appearance:  Normal body habitus. Alert and oriented to person, place, and time.   Affect:  Normal.   Gait: Antalgic favoring both legs. Good balance and coordination.   Skin:  Intact.   Sensation:  Intact.   Strength:  Intact.  Limited by pain  Reflexes:  Intact.   Pulses:  Intact.   Knee Exam:    Effusion: 1+    Range of Motion Right Left   Extension -4 -2   Flexion 105 110     Provocative Test Right Left    Positive Negative Positive Negative   Anterior drawer [] [] [] [x]   Lachman [] [x] [] [x]   Posterior drawer [] [x] [] [x]   Varus testing [x] [] [x] []   Valgus testing [x] [] [x] []   Joint line tenderness [x] [] [x] []     Additional Exam Comments: His neurocirculatory lymphatic exam is unchanged and reasonably normal.  He does have

## 2024-03-13 ENCOUNTER — OFFICE VISIT (OUTPATIENT)
Dept: ORTHOPEDIC SURGERY | Age: 59
End: 2024-03-13

## 2024-03-13 VITALS — BODY MASS INDEX: 33.66 KG/M2 | HEIGHT: 73 IN | WEIGHT: 254 LBS

## 2024-03-13 DIAGNOSIS — G56.01 CARPAL TUNNEL SYNDROME ON RIGHT: Primary | ICD-10-CM

## 2024-03-13 NOTE — PROGRESS NOTES
CARPAL TUNNEL VISIT        HISTORY OF PRESENT ILLNESS    Nakul Parkinson is a 59 y.o. male who presents for reevaluation of his right hand.  He has had a carpal tunnel release performed in the past but is having increasing pain and numbness goes from his shoulder down to his hand in the median nerve distribution.  He has also had neck surgery in the past.  He grades pain generally 2-6/10 depending upon activity but is having difficulty with  and dropping things and difficulty with repetitive tasks.  It wakes him up at night and while driving.  All the symptoms could be construed to be carpal tunnel syndrome recurrent.  The consideration is also that it could be a double crush or involving just the cervical roots.  He denies any interval trauma.    ROS    Well-documented patient history form dated 3/13/2024  All other ROS negative except for above.    Past Surgical history    Past Surgical History:   Procedure Laterality Date    BACK SURGERY N/A 11/18/2022    REMOVAL OF LIPOMA AND CYST CERVICAL/THORACIC AREA performed by Antonio Cui MD at [facility] OR    CARPAL TUNNEL RELEASE      COLONOSCOPY  11/04/2016    normal    CYST REMOVAL      HAND SURGERY      HERNIA REPAIR      KNEE ARTHROSCOPY      KNEE ARTHROSCOPY Right 12/01/2014    KNEE ARTHROSCOPY Left 11/12/2018    KNEE VIDEO ARTHROSCOPY, MEDIAL MENISECTOMY, CHONDROPLASTY, INTERNAL FIXATION MEDIAL TIBIAL PLATEAU INSUFFICIENCY FRACTURE WITH BONE SUBSTITUTE     KNEE SURGERY      IL ARTHROSCOPY KNEE OSTEOCHONDRAL ALLOGRAFT Left 11/12/2018    KNEE VIDEO ARTHROSCOPY, MEDIAL MENISECTOMY, CHONDROPLASTY, INTERNAL FIXATION MEDIAL TIBIAL PLATEAU INSUFFICIENCY FRACTURE WITH BONE SUBSTITUTE performed by Hernando Iqbal MD at [facility] OR       PAST MEDICAL    Past Medical History:   Diagnosis Date    Carpal tunnel syndrome 10/24/2013    Herniated disc     Hypertension     PONV (postoperative nausea and vomiting)     Primary localized osteoarthrosis, lower leg 10/17/2013

## 2024-03-14 ENCOUNTER — OFFICE VISIT (OUTPATIENT)
Dept: ORTHOPEDIC SURGERY | Age: 59
End: 2024-03-14

## 2024-03-14 VITALS — WEIGHT: 254 LBS | BODY MASS INDEX: 33.66 KG/M2 | HEIGHT: 73 IN

## 2024-03-14 DIAGNOSIS — G56.01 CARPAL TUNNEL SYNDROME ON RIGHT: ICD-10-CM

## 2024-03-14 DIAGNOSIS — M54.12 CERVICAL RADICULOPATHY: ICD-10-CM

## 2024-03-14 DIAGNOSIS — M47.812 CERVICAL SPONDYLOSIS WITHOUT MYELOPATHY: Primary | ICD-10-CM

## 2024-03-14 NOTE — PROGRESS NOTES
Chief Complaint    Neck Pain (WC CERVICAL PAIN)      History of Present Illness:  Nakul Parkinson is a 59 y.o. male presents for evaluation of neck.  He has a longstanding work-related injury to his neck that resulted several months ago with an anterior cervical discectomy and fusion by Dr. Cui.  This time he has been complaining even slightly before the time of persistent recurrent numbness in both hands.  He has been trying to wear braces which has helped the right hand at night but still has symptoms during the day but does not help his left hand where he still has numbness despite wearing the brace at night.  That issue is intermittent and his pain is 4-6/10.  He feels weak and debilitated.       Medical History:  Patient's medications, allergies, past medical, surgical, social and family histories were reviewed and updated as appropriate.    Review of Systems:  Pertinent items are noted in HPI  Review of systems reviewed from Patient History Form dated on 3/14/2024 and available in the patient's chart under the Media tab.       Vital Signs:  Ht 1.854 m (6' 1\")   Wt 115.2 kg (254 lb)   BMI 33.51 kg/m²     General Exam:   Constitutional: Patient is adequately groomed with no evidence of malnutrition  DTRs: Deep tendon reflexes are intact  Mental Status: The patient is oriented to time, place and person.  The patient's mood and affect are appropriate.  Lymphatic: The lymphatic examination bilaterally reveals all areas to be without enlargement or induration.  Vascular: Examination reveals no swelling or calf tenderness.  Peripheral pulses are palpable and 2+.    Cervical Spine Examination:    Inspection: No visible lesions and well-healed scar    Palpation: Generalized tenderness to palpation around the paraspinous musculature and trapezial ridges    Range of Motion: He has some limited range of motion particularly with lateral flexion and rotation    Strength: Relatively normal and symmetric    Special

## 2024-03-18 ENCOUNTER — OFFICE VISIT (OUTPATIENT)
Dept: FAMILY MEDICINE CLINIC | Age: 59
End: 2024-03-18
Payer: COMMERCIAL

## 2024-03-18 VITALS
BODY MASS INDEX: 34.04 KG/M2 | OXYGEN SATURATION: 93 % | SYSTOLIC BLOOD PRESSURE: 136 MMHG | DIASTOLIC BLOOD PRESSURE: 77 MMHG | HEART RATE: 76 BPM | WEIGHT: 258 LBS

## 2024-03-18 DIAGNOSIS — K21.00 GASTROESOPHAGEAL REFLUX DISEASE WITH ESOPHAGITIS WITHOUT HEMORRHAGE: ICD-10-CM

## 2024-03-18 DIAGNOSIS — Z12.5 SCREENING FOR PROSTATE CANCER: ICD-10-CM

## 2024-03-18 DIAGNOSIS — E78.2 MIXED HYPERLIPIDEMIA: ICD-10-CM

## 2024-03-18 DIAGNOSIS — M47.812 CERVICAL SPONDYLOSIS WITHOUT MYELOPATHY: ICD-10-CM

## 2024-03-18 DIAGNOSIS — I10 ESSENTIAL HYPERTENSION: ICD-10-CM

## 2024-03-18 DIAGNOSIS — E11.9 TYPE 2 DIABETES MELLITUS WITHOUT COMPLICATION, WITHOUT LONG-TERM CURRENT USE OF INSULIN (HCC): Primary | ICD-10-CM

## 2024-03-18 DIAGNOSIS — E66.9 OBESITY (BMI 30-39.9): ICD-10-CM

## 2024-03-18 LAB
25(OH)D3 SERPL-MCNC: 25.9 NG/ML
ALBUMIN SERPL-MCNC: 4.5 G/DL (ref 3.4–5)
ALBUMIN/GLOB SERPL: 1.6 {RATIO} (ref 1.1–2.2)
ALP SERPL-CCNC: 70 U/L (ref 40–129)
ALT SERPL-CCNC: 18 U/L (ref 10–40)
ANION GAP SERPL CALCULATED.3IONS-SCNC: 11 MMOL/L (ref 3–16)
AST SERPL-CCNC: 19 U/L (ref 15–37)
BASOPHILS # BLD: 0 K/UL (ref 0–0.2)
BASOPHILS NFR BLD: 0.5 %
BILIRUB SERPL-MCNC: 0.7 MG/DL (ref 0–1)
BUN SERPL-MCNC: 19 MG/DL (ref 7–20)
CALCIUM SERPL-MCNC: 9.4 MG/DL (ref 8.3–10.6)
CHLORIDE SERPL-SCNC: 107 MMOL/L (ref 99–110)
CHOLEST SERPL-MCNC: 167 MG/DL (ref 0–199)
CO2 SERPL-SCNC: 23 MMOL/L (ref 21–32)
CREAT SERPL-MCNC: 0.8 MG/DL (ref 0.8–1.3)
CREAT UR-MCNC: 186.8 MG/DL (ref 39–259)
DEPRECATED RDW RBC AUTO: 14.9 % (ref 12.4–15.4)
EOSINOPHIL # BLD: 0.1 K/UL (ref 0–0.6)
EOSINOPHIL NFR BLD: 0.9 %
FOLATE SERPL-MCNC: 11.37 NG/ML (ref 4.78–24.2)
GFR SERPLBLD CREATININE-BSD FMLA CKD-EPI: >60 ML/MIN/{1.73_M2}
GLUCOSE SERPL-MCNC: 135 MG/DL (ref 70–99)
HCT VFR BLD AUTO: 48.3 % (ref 40.5–52.5)
HDLC SERPL-MCNC: 31 MG/DL (ref 40–60)
HGB BLD-MCNC: 16.5 G/DL (ref 13.5–17.5)
IRON SATN MFR SERPL: 39 % (ref 20–50)
IRON SERPL-MCNC: 141 UG/DL (ref 59–158)
LDLC SERPL CALC-MCNC: 98 MG/DL
LYMPHOCYTES # BLD: 1.5 K/UL (ref 1–5.1)
LYMPHOCYTES NFR BLD: 19.1 %
MCH RBC QN AUTO: 29.8 PG (ref 26–34)
MCHC RBC AUTO-ENTMCNC: 34.2 G/DL (ref 31–36)
MCV RBC AUTO: 87.1 FL (ref 80–100)
MICROALBUMIN UR DL<=1MG/L-MCNC: <1.2 MG/DL
MICROALBUMIN/CREAT UR: NORMAL MG/G (ref 0–30)
MONOCYTES # BLD: 0.5 K/UL (ref 0–1.3)
MONOCYTES NFR BLD: 6.1 %
NEUTROPHILS # BLD: 5.7 K/UL (ref 1.7–7.7)
NEUTROPHILS NFR BLD: 73.4 %
PLATELET # BLD AUTO: 185 K/UL (ref 135–450)
PMV BLD AUTO: 8.7 FL (ref 5–10.5)
POTASSIUM SERPL-SCNC: 4.5 MMOL/L (ref 3.5–5.1)
PROT SERPL-MCNC: 7.3 G/DL (ref 6.4–8.2)
PSA SERPL DL<=0.01 NG/ML-MCNC: 0.42 NG/ML (ref 0–4)
RBC # BLD AUTO: 5.54 M/UL (ref 4.2–5.9)
SODIUM SERPL-SCNC: 141 MMOL/L (ref 136–145)
T4 FREE SERPL-MCNC: 1.2 NG/DL (ref 0.9–1.8)
TIBC SERPL-MCNC: 362 UG/DL (ref 260–445)
TRIGL SERPL-MCNC: 190 MG/DL (ref 0–150)
TSH SERPL DL<=0.005 MIU/L-ACNC: 2.03 UIU/ML (ref 0.27–4.2)
VIT B12 SERPL-MCNC: 428 PG/ML (ref 211–911)
VLDLC SERPL CALC-MCNC: 38 MG/DL
WBC # BLD AUTO: 7.8 K/UL (ref 4–11)

## 2024-03-18 PROCEDURE — 3078F DIAST BP <80 MM HG: CPT | Performed by: FAMILY MEDICINE

## 2024-03-18 PROCEDURE — 99214 OFFICE O/P EST MOD 30 MIN: CPT | Performed by: FAMILY MEDICINE

## 2024-03-18 PROCEDURE — G2211 COMPLEX E/M VISIT ADD ON: HCPCS | Performed by: FAMILY MEDICINE

## 2024-03-18 PROCEDURE — 3075F SYST BP GE 130 - 139MM HG: CPT | Performed by: FAMILY MEDICINE

## 2024-03-18 SDOH — ECONOMIC STABILITY: FOOD INSECURITY: WITHIN THE PAST 12 MONTHS, YOU WORRIED THAT YOUR FOOD WOULD RUN OUT BEFORE YOU GOT MONEY TO BUY MORE.: NEVER TRUE

## 2024-03-18 SDOH — ECONOMIC STABILITY: FOOD INSECURITY: WITHIN THE PAST 12 MONTHS, THE FOOD YOU BOUGHT JUST DIDN'T LAST AND YOU DIDN'T HAVE MONEY TO GET MORE.: NEVER TRUE

## 2024-03-18 SDOH — ECONOMIC STABILITY: INCOME INSECURITY: HOW HARD IS IT FOR YOU TO PAY FOR THE VERY BASICS LIKE FOOD, HOUSING, MEDICAL CARE, AND HEATING?: NOT HARD AT ALL

## 2024-03-18 ASSESSMENT — PATIENT HEALTH QUESTIONNAIRE - PHQ9
1. LITTLE INTEREST OR PLEASURE IN DOING THINGS: NOT AT ALL
SUM OF ALL RESPONSES TO PHQ QUESTIONS 1-9: 0
SUM OF ALL RESPONSES TO PHQ QUESTIONS 1-9: 0
2. FEELING DOWN, DEPRESSED OR HOPELESS: NOT AT ALL
SUM OF ALL RESPONSES TO PHQ9 QUESTIONS 1 & 2: 0
SUM OF ALL RESPONSES TO PHQ QUESTIONS 1-9: 0
SUM OF ALL RESPONSES TO PHQ QUESTIONS 1-9: 0

## 2024-03-18 NOTE — PROGRESS NOTES
Chief Complaint   Patient presents with    Diabetes    Hypertension        Internal Administration   First Dose      Second Dose           Last COVID Lab SARS-CoV-2 Antibody, Total (no units)   Date Value   2021 Negative     POC Glucose (mg/dl)   Date Value   2022 133 (H)             Wt Readings from Last 3 Encounters:   24 117 kg (258 lb)   24 115.2 kg (254 lb)   24 115.2 kg (254 lb)     BP Readings from Last 3 Encounters:   24 136/77   23 121/79   23 138/80      Lab Results   Component Value Date    LABA1C 6.0 2023    LABA1C 6.3 2023    LABA1C 6.2 2022       HPI:  Nakul Parkinson is a 59 y.o. (: 1965) here today for    Patient was asking about a couple different GI tests. He asked about testing to look for nutrient deficiencies. Discussed the vitamins that could be tested. Patient wanted to discuss his elevated testosterone from urology. Patient also asked if his ears could be checked due to having a lot of wax build up in the past. His ears are clear today in office.     [x] Patient has completed an advance directive  [] Patient has NOT completed an advanced directive  [x] Patient has a documented healthcare surrogate  [] Patient does NOT have a documented healthcare surrogate  [] Discussed the importance of establishing and updating an advanced directive.  Patient has questions at this time and those were answered.  [x] Discussed the importance of establishing and updating an advanced directive.  Patient does NOT have questions at this time.    Discussed with: [x] Patient            [] Family             [] Other caregiver    Patient's medications, allergies, past medical, surgical, social and family histories were reviewed and updated asappropriate on 3/18/2024 at 9:24 AM.    ROS:  Review of Systems    All other systems reviewed and are negative except as noted above on 3/18/2024 at 9:24 AM. Additional review of systems may be scanned

## 2024-03-18 NOTE — PATIENT INSTRUCTIONS
Not feeling your best?  Where to go for the right care at the right time.    Dear Nakul Parkinson   I wanted to provide you with some information that might help you seek care for your condition when your primary care provider or specialist is unavailable. If you have a need outside of normal business hours, you should first contact your primary care office or specialist caring for your condition. They may have on-call providers that could assist with your care. During office hours, you may request a virtual or same day appointment.   But what if your primary care provider is not in the office that day and you can't wait until the  next day for care? In that situation, your next option is to visit an urgent care facility.          [facility] now has urgent care sites open to support our community.   [facility] is a great alternative when you need immediate medical care that is not a serious threat to your health or your doctor's office is closed or unable to get you in for an appointment. The urgent care centers offer fast access to [facility] doctors for minor illnesses and injuries for patients of all ages. There are other medical services available including lab testing, X-rays, EKGs, and IV fluids.  Locations are open daily from 8 a.m. - 8 p.m.     [facility]  [address], [place], [place] [address]  [phone]    [address], [place], [place] [address]  [phone]    Local Urgent Care     [facility] 8:30 am - 7:70 pm   [address] [place] [address]  [phone]    [facility]     8 am - 8 pm   [address] [place] [address]  [phone]    [facility] 7 am - 7:30 pm  [address] [place] [address]  [phone]     [facility] [place] 7 am - 7:30 pm  [address] [place] [address]  [phone]    [place]

## 2024-03-19 LAB
EST. AVERAGE GLUCOSE BLD GHB EST-MCNC: 131.2 MG/DL
HBA1C MFR BLD: 6.2 %

## 2024-03-23 LAB — VIT B1 BLD-MCNC: 129 NMOL/L (ref 70–180)

## 2024-04-04 DIAGNOSIS — E78.2 MIXED HYPERLIPIDEMIA: ICD-10-CM

## 2024-04-04 DIAGNOSIS — E11.9 TYPE 2 DIABETES MELLITUS WITHOUT COMPLICATION, WITHOUT LONG-TERM CURRENT USE OF INSULIN (HCC): ICD-10-CM

## 2024-04-05 RX ORDER — ROSUVASTATIN CALCIUM 10 MG/1
TABLET, COATED ORAL
Qty: 90 TABLET | Refills: 3 | Status: SHIPPED | OUTPATIENT
Start: 2024-04-05

## 2024-04-05 RX ORDER — GLIMEPIRIDE 1 MG/1
TABLET ORAL
Qty: 90 TABLET | Refills: 3 | Status: SHIPPED | OUTPATIENT
Start: 2024-04-05

## 2024-04-05 NOTE — TELEPHONE ENCOUNTER
Refill Request     CONFIRM preferrred pharmacy with the patient.    If Mail Order Rx - Pend for 90 day refill.      Last Seen: Last Seen Department: 3/18/2024  Last Seen by PCP: 3/18/2024    Last Written: 3/13/23    If no future appointment scheduled, route STAFF MESSAGE with patient name to the  Pool for scheduling.      Next Appointment:   Future Appointments   Date Time Provider Department Center   9/18/2024  9:20 AM [facility]       Message sent to  to schedule appt with patient?  N/A      Requested Prescriptions     Pending Prescriptions Disp Refills    glimepiride (AMARYL) 1 MG tablet [Pharmacy Med Name: GLIMEPIRIDE 1 MG TABLET] 90 tablet 3     Sig: TAKE ONE TABLET BY MOUTH EVERY MORNING BEFORE BREAKFAST    rosuvastatin (CRESTOR) 10 MG tablet [Pharmacy Med Name: ROSUVASTATIN CALCIUM 10 MG TAB] 90 tablet 3     Sig: TAKE ONE TABLET BY MOUTH DAILY

## 2024-04-26 ENCOUNTER — TELEPHONE (OUTPATIENT)
Dept: ORTHOPEDIC SURGERY | Age: 59
End: 2024-04-26

## 2024-05-14 ENCOUNTER — PREP FOR PROCEDURE (OUTPATIENT)
Dept: ORTHOPEDIC SURGERY | Age: 59
End: 2024-05-14

## 2024-05-14 DIAGNOSIS — M17.11 PRIMARY OSTEOARTHRITIS OF RIGHT KNEE: Primary | ICD-10-CM

## 2024-05-23 ENCOUNTER — OFFICE VISIT (OUTPATIENT)
Dept: ORTHOPEDIC SURGERY | Age: 59
End: 2024-05-23

## 2024-05-23 DIAGNOSIS — G56.01 CARPAL TUNNEL SYNDROME ON RIGHT: Primary | ICD-10-CM

## 2024-05-23 DIAGNOSIS — M17.11 PRIMARY OSTEOARTHRITIS OF RIGHT KNEE: ICD-10-CM

## 2024-05-23 RX ORDER — ACETAMINOPHEN 500 MG
500 TABLET ORAL EVERY 6 HOURS PRN
Qty: 100 TABLET | Refills: 1 | Status: SHIPPED | OUTPATIENT
Start: 2024-05-23

## 2024-05-23 RX ORDER — ACETAMINOPHEN 325 MG/1
325 TABLET ORAL EVERY 4 HOURS PRN
Qty: 120 TABLET | Refills: 3 | Status: SHIPPED | OUTPATIENT
Start: 2024-05-23

## 2024-05-23 RX ORDER — TIZANIDINE 4 MG/1
4 TABLET ORAL 3 TIMES DAILY PRN
Qty: 30 TABLET | Refills: 0 | Status: SHIPPED | OUTPATIENT
Start: 2024-05-23

## 2024-05-23 RX ORDER — TRAMADOL HYDROCHLORIDE 50 MG/1
50 TABLET ORAL EVERY 6 HOURS PRN
Qty: 28 TABLET | Refills: 0 | Status: SHIPPED | OUTPATIENT
Start: 2024-05-23 | End: 2024-05-30

## 2024-05-23 RX ORDER — OXYCODONE HYDROCHLORIDE 5 MG/1
5 TABLET ORAL EVERY 4 HOURS PRN
Qty: 42 TABLET | Refills: 0 | Status: SHIPPED | OUTPATIENT
Start: 2024-05-23 | End: 2024-05-30

## 2024-05-23 NOTE — PROGRESS NOTES
He is here and wanted to review some things in regards to his upcoming total knee replacement.  He also says he had an EMG results of which have not been released yet.  At this time I will give him prescription so he has as available prior to his surgery as it may be done as an outpatient.  Have also given prescription for roller walker and we will need to help him arrange for home health physical therapy and nursing.  He appears comfortable with all these recommendations and will proceed accordingly.

## 2024-05-28 ENCOUNTER — OFFICE VISIT (OUTPATIENT)
Dept: FAMILY MEDICINE CLINIC | Age: 59
End: 2024-05-28
Payer: COMMERCIAL

## 2024-05-28 ENCOUNTER — TELEPHONE (OUTPATIENT)
Dept: ORTHOPEDIC SURGERY | Age: 59
End: 2024-05-28

## 2024-05-28 ENCOUNTER — TELEPHONE (OUTPATIENT)
Dept: FAMILY MEDICINE CLINIC | Age: 59
End: 2024-05-28

## 2024-05-28 VITALS
HEIGHT: 73 IN | HEART RATE: 87 BPM | OXYGEN SATURATION: 96 % | DIASTOLIC BLOOD PRESSURE: 78 MMHG | WEIGHT: 258 LBS | BODY MASS INDEX: 34.19 KG/M2 | SYSTOLIC BLOOD PRESSURE: 110 MMHG

## 2024-05-28 DIAGNOSIS — E11.9 TYPE 2 DIABETES MELLITUS WITHOUT COMPLICATION, WITHOUT LONG-TERM CURRENT USE OF INSULIN (HCC): ICD-10-CM

## 2024-05-28 DIAGNOSIS — Z01.818 PRE-OP EXAM: Primary | ICD-10-CM

## 2024-05-28 DIAGNOSIS — E78.2 MIXED HYPERLIPIDEMIA: ICD-10-CM

## 2024-05-28 DIAGNOSIS — I10 ESSENTIAL HYPERTENSION: ICD-10-CM

## 2024-05-28 LAB
BILIRUBIN, POC: NORMAL
BLOOD URINE, POC: NORMAL
CLARITY, POC: CLEAR
COLOR, POC: YELLOW
GLUCOSE URINE, POC: NORMAL
KETONES, POC: NORMAL
LEUKOCYTE EST, POC: NORMAL
NITRITE, POC: NORMAL
PH, POC: 5.5
PROTEIN, POC: NORMAL
SPECIFIC GRAVITY, POC: >=1.03
UROBILINOGEN, POC: 0.2

## 2024-05-28 PROCEDURE — 99214 OFFICE O/P EST MOD 30 MIN: CPT | Performed by: NURSE PRACTITIONER

## 2024-05-28 PROCEDURE — 3078F DIAST BP <80 MM HG: CPT | Performed by: NURSE PRACTITIONER

## 2024-05-28 PROCEDURE — 3074F SYST BP LT 130 MM HG: CPT | Performed by: NURSE PRACTITIONER

## 2024-05-28 PROCEDURE — 93000 ELECTROCARDIOGRAM COMPLETE: CPT | Performed by: NURSE PRACTITIONER

## 2024-05-28 PROCEDURE — 81002 URINALYSIS NONAUTO W/O SCOPE: CPT | Performed by: NURSE PRACTITIONER

## 2024-05-28 PROCEDURE — 3044F HG A1C LEVEL LT 7.0%: CPT | Performed by: NURSE PRACTITIONER

## 2024-05-28 NOTE — TELEPHONE ENCOUNTER
Spoke with patient, he states the pharmacies will not take the script he was given for the rolling walker - the only place that will provide it is [facility] and states they will not do it without a c9 form filled out.

## 2024-05-28 NOTE — PROGRESS NOTES
[facility]                             Radha Abreu                             Preoperative Evaluation        Nakul Parkinson  YOB: 1965    Date of Service:  5/28/2024    Vitals:    05/28/24 1055   BP: 110/78   Site: Right Upper Arm   Position: Sitting   Cuff Size: Large Adult   Pulse: 87   SpO2: 96%   Weight: 117 kg (258 lb)   Height: 1.854 m (6' 1\")      Wt Readings from Last 2 Encounters:   05/28/24 117 kg (258 lb)   03/18/24 117 kg (258 lb)     BP Readings from Last 3 Encounters:   05/28/24 110/78   03/18/24 136/77   08/28/23 121/79        Chief Complaint   Patient presents with    Pre-op Exam     Pt is here for pre op for right knee replacement with Dr Iqbal on 6/19/24 at [facility]      Allergies   Allergen Reactions    Dye [Iodides]     Iodine Swelling    Penicillins Rash     Outpatient Medications Marked as Taking for the 5/28/24 encounter (Office Visit) with Radha Abreu APRN - CNP   Medication Sig Dispense Refill    oxyCODONE (ROXICODONE) 5 MG immediate release tablet Take 1 tablet by mouth every 4 hours as needed for Pain for up to 7 days. Intended supply: 3 days. Take lowest dose possible to manage pain Max Daily Amount: 30 mg 42 tablet 0    traMADol (ULTRAM) 50 MG tablet Take 1 tablet by mouth every 6 hours as needed for Pain for up to 7 days. Intended supply: 7 days. Take lowest dose possible to manage pain Max Daily Amount: 200 mg 28 tablet 0    tiZANidine (ZANAFLEX) 4 MG tablet Take 1 tablet by mouth 3 times daily as needed (3 times a day) 30 tablet 0    acetaminophen (TYLENOL) 500 MG tablet Take 1 tablet by mouth every 6 hours as needed for Pain 100 tablet 1    acetaminophen (AMINOFEN) 325 MG tablet Take 1 tablet by mouth every 4 hours as needed for Pain 120 tablet 3    glimepiride (AMARYL) 1 MG tablet TAKE ONE TABLET BY MOUTH EVERY MORNING BEFORE BREAKFAST 90 tablet 3    rosuvastatin (CRESTOR) 10 MG tablet TAKE ONE TABLET

## 2024-05-28 NOTE — PATIENT INSTRUCTIONS
Not feeling your best?  Where to go for the right care at the right time.    Dear Nakul Parkinson   I wanted to provide you with some information that might help you seek care for your condition when your primary care provider or specialist is unavailable. If you have a need outside of normal business hours, you should first contact your primary care office or specialist caring for your condition. They may have on-call providers that could assist with your care. During office hours, you may request a virtual or same day appointment.   But what if your primary care provider is not in the office that day and you can't wait until the  next day for care? In that situation, your next option is to visit an urgent care facility.          [facility] now has urgent care sites open to support our community.   [facility] is a great alternative when you need immediate medical care that is not a serious threat to your health or your doctor's office is closed or unable to get you in for an appointment. The urgent care centers offer fast access to [facility] doctors for minor illnesses and injuries for patients of all ages. There are other medical services available including lab testing, X-rays, EKGs, and IV fluids.  Locations are open daily from 8 a.m. - 8 p.m.     [facility]  [address],  [address]  [phone]    [address], [place], [place] [address]  [phone]    Local Urgent Care     [facility] 8:30 am - 7:70 pm   [address] [place] [address]  [phone]    [facility]     8 am - 8 pm   [address] [place] [address]  [phone]    [facility] 7 am - 7:30 pm  [address] [place] [address]  [phone]     [facility] [place] 7 am - 7:30 pm  [address] [place] [address]  [phone]    [place]

## 2024-05-28 NOTE — TELEPHONE ENCOUNTER
hoda    Spoke with Eli at Dr. Fierro office per courtney regarding Hinojosa walker being sent to [facility]. She said they may not have sent it yet because its typically sent for the patients surgery but she will send a message back to the girls and get that order sent over for him.

## 2024-05-29 LAB
ALBUMIN SERPL-MCNC: 4.2 G/DL (ref 3.4–5)
ALBUMIN/GLOB SERPL: 1.7 {RATIO} (ref 1.1–2.2)
ALP SERPL-CCNC: 67 U/L (ref 40–129)
ALT SERPL-CCNC: 20 U/L (ref 10–40)
ANION GAP SERPL CALCULATED.3IONS-SCNC: 13 MMOL/L (ref 3–16)
AST SERPL-CCNC: 18 U/L (ref 15–37)
BASOPHILS # BLD: 0 K/UL (ref 0–0.2)
BASOPHILS NFR BLD: 0 %
BILIRUB SERPL-MCNC: 0.4 MG/DL (ref 0–1)
BUN SERPL-MCNC: 15 MG/DL (ref 7–20)
CALCIUM SERPL-MCNC: 9.1 MG/DL (ref 8.3–10.6)
CHLORIDE SERPL-SCNC: 106 MMOL/L (ref 99–110)
CO2 SERPL-SCNC: 23 MMOL/L (ref 21–32)
CREAT SERPL-MCNC: 0.6 MG/DL (ref 0.8–1.3)
DEPRECATED RDW RBC AUTO: 14.7 % (ref 12.4–15.4)
EOSINOPHIL # BLD: 0.1 K/UL (ref 0–0.6)
EOSINOPHIL NFR BLD: 1 %
GFR SERPLBLD CREATININE-BSD FMLA CKD-EPI: >90 ML/MIN/{1.73_M2}
GLUCOSE SERPL-MCNC: 100 MG/DL (ref 70–99)
HCT VFR BLD AUTO: 48.3 % (ref 40.5–52.5)
HGB BLD-MCNC: 16.3 G/DL (ref 13.5–17.5)
LYMPHOCYTES # BLD: 2 K/UL (ref 1–5.1)
LYMPHOCYTES NFR BLD: 20 %
MCH RBC QN AUTO: 30.5 PG (ref 26–34)
MCHC RBC AUTO-ENTMCNC: 33.9 G/DL (ref 31–36)
MCV RBC AUTO: 90.1 FL (ref 80–100)
MONOCYTES # BLD: 0.8 K/UL (ref 0–1.3)
MONOCYTES NFR BLD: 8 %
NEUTROPHILS # BLD: 7.2 K/UL (ref 1.7–7.7)
NEUTROPHILS NFR BLD: 67 %
NEUTS BAND NFR BLD MANUAL: 4 % (ref 0–7)
PLATELET # BLD AUTO: 180 K/UL (ref 135–450)
PMV BLD AUTO: 8.9 FL (ref 5–10.5)
POTASSIUM SERPL-SCNC: 4.6 MMOL/L (ref 3.5–5.1)
PROT SERPL-MCNC: 6.7 G/DL (ref 6.4–8.2)
RBC # BLD AUTO: 5.36 M/UL (ref 4.2–5.9)
RBC MORPH BLD: NORMAL
SLIDE REVIEW: NORMAL
SODIUM SERPL-SCNC: 142 MMOL/L (ref 136–145)
WBC # BLD AUTO: 10.2 K/UL (ref 4–11)

## 2024-06-06 ENCOUNTER — TELEPHONE (OUTPATIENT)
Dept: ORTHOPEDIC SURGERY | Age: 59
End: 2024-06-06

## 2024-06-06 NOTE — TELEPHONE ENCOUNTER
POST OP ROLLING WALKER APPROVED THROUGH [employer] AS AUTH NOT REQUIRED. PLEASE CONTACT INJURED WORKER TO HELP HIM OBTAIN.    PH: [phone]

## 2024-06-06 NOTE — PROGRESS NOTES

## 2024-06-06 NOTE — TELEPHONE ENCOUNTER
Care coordinator has already spoke with patient, patient is aware the script was faxed over to [facility].

## 2024-06-07 ENCOUNTER — TELEPHONE (OUTPATIENT)
Dept: ORTHOPEDIC SURGERY | Age: 59
End: 2024-06-07

## 2024-06-07 NOTE — TELEPHONE ENCOUNTER
Orthopedic Nurse Navigator Summary    Patient Name:  Nakul Parkinson  Anticipated Date of Surgery:  06/19/24  Attended Pre-op Education Class:  Video sent to patient email 06/04/24  PCP: Hernando iWng MD  Date of PCP visit for H&P: 05/28/24  Is patient in a Pain Management program:  Review of Medical history reveals history of: HTN, Hyperlipidemia, Diabetes, GERD, Anxiety, Esophageal dysphagia, Cervical spondylosis, PONV, Memory loss    Critical Lab Values  - Hemoglobin (g/dL):  Date: 05/28/24 Value 16.3  - Hematocrit(%): Date: 05/28/24  Value 48.3  - HgbA1C:  Date: 03/18/24 Value 6.2  - Albumin:  Date: 05/28/24  Value 4.2  - BUN:  Date: 05/28/24  Value 15  - Creatinine:  Date: 05/28/24  Value 0.6    Coronary Artery Disease/HTN/CHF history: Yes  Does the patient see a Cardiologist: No  Date of most recent cardiac appt:  On any anticoagulation:  No    Diabetes History:  Yes  Most recent HgbA1C: 6.2  Pulmonary:  COPD/Emphysema/Use of home oxygen: No  Alcohol use: Yes    BMI greater than 40 at time of scheduling:  No  Additional medical concerns:  Additional recommendations for above concerns:  Attended Pre-Hab program:    Anticipated Discharge Disposition:  Home with [facility]  Who will be with patient at home following discharge:  He lives alone, but his brother will bring him to surgery.  Patient initially asked for transportation to be approved via [facility] for therapy appointment, and any physician visits.  We discussed that transportation is not approved typically for a patient that is good health overall.  He will talk to his brother and make arrangements for rides to therapy once HHC is done.  He is requesting nursing visits.  Equipment patient already has:  He is working with [employer] in [place] to get a walker.  I faxed his walker order and C9 for walker to [employer], and Beverly did as well. He does have a walk in shower.  Bedroom on first or second floor:  first  Bathroom on first or second

## 2024-06-18 ENCOUNTER — ANESTHESIA EVENT (OUTPATIENT)
Dept: OPERATING ROOM | Age: 59
End: 2024-06-18
Payer: OTHER GOVERNMENT

## 2024-06-19 ENCOUNTER — HOSPITAL ENCOUNTER (OUTPATIENT)
Age: 59
Discharge: HOME | End: 2024-06-21
Attending: ORTHOPAEDIC SURGERY | Admitting: ORTHOPAEDIC SURGERY
Payer: OTHER GOVERNMENT

## 2024-06-19 ENCOUNTER — APPOINTMENT (OUTPATIENT)
Dept: GENERAL RADIOLOGY | Age: 59
End: 2024-06-19
Payer: OTHER GOVERNMENT

## 2024-06-19 ENCOUNTER — ANESTHESIA (OUTPATIENT)
Dept: OPERATING ROOM | Age: 59
End: 2024-06-19
Payer: OTHER GOVERNMENT

## 2024-06-19 DIAGNOSIS — M17.11 PRIMARY OSTEOARTHRITIS OF RIGHT KNEE: Primary | ICD-10-CM

## 2024-06-19 LAB
GLUCOSE BLD-MCNC: 152 MG/DL (ref 70–99)
GLUCOSE BLD-MCNC: 152 MG/DL (ref 70–99)
GLUCOSE BLD-MCNC: 157 MG/DL (ref 70–99)
GLUCOSE BLD-MCNC: 196 MG/DL (ref 70–99)
PERFORMED ON: ABNORMAL

## 2024-06-19 PROCEDURE — 2580000003 HC RX 258

## 2024-06-19 PROCEDURE — 3700000001 HC ADD 15 MINUTES (ANESTHESIA): Performed by: ORTHOPAEDIC SURGERY

## 2024-06-19 PROCEDURE — L1830 KO IMMOB CANVAS LONG PRE OTS: HCPCS | Performed by: ORTHOPAEDIC SURGERY

## 2024-06-19 PROCEDURE — 6360000002 HC RX W HCPCS

## 2024-06-19 PROCEDURE — C9290 INJ, BUPIVACAINE LIPOSOME: HCPCS | Performed by: ORTHOPAEDIC SURGERY

## 2024-06-19 PROCEDURE — 3600000005 HC SURGERY LEVEL 5 BASE: Performed by: ORTHOPAEDIC SURGERY

## 2024-06-19 PROCEDURE — 6360000002 HC RX W HCPCS: Performed by: ORTHOPAEDIC SURGERY

## 2024-06-19 PROCEDURE — 2580000003 HC RX 258: Performed by: ORTHOPAEDIC SURGERY

## 2024-06-19 PROCEDURE — 6360000002 HC RX W HCPCS: Performed by: ANESTHESIOLOGY

## 2024-06-19 PROCEDURE — 6370000000 HC RX 637 (ALT 250 FOR IP): Performed by: ANESTHESIOLOGY

## 2024-06-19 PROCEDURE — 94150 VITAL CAPACITY TEST: CPT

## 2024-06-19 PROCEDURE — 6370000000 HC RX 637 (ALT 250 FOR IP): Performed by: ORTHOPAEDIC SURGERY

## 2024-06-19 PROCEDURE — 2580000003 HC RX 258: Performed by: ANESTHESIOLOGY

## 2024-06-19 PROCEDURE — 97530 THERAPEUTIC ACTIVITIES: CPT

## 2024-06-19 PROCEDURE — 73560 X-RAY EXAM OF KNEE 1 OR 2: CPT

## 2024-06-19 PROCEDURE — 64447 NJX AA&/STRD FEMORAL NRV IMG: CPT | Performed by: ANESTHESIOLOGY

## 2024-06-19 PROCEDURE — 27447 TOTAL KNEE ARTHROPLASTY: CPT | Performed by: ORTHOPAEDIC SURGERY

## 2024-06-19 PROCEDURE — G0378 HOSPITAL OBSERVATION PER HR: HCPCS

## 2024-06-19 PROCEDURE — 2709999900 HC NON-CHARGEABLE SUPPLY: Performed by: ORTHOPAEDIC SURGERY

## 2024-06-19 PROCEDURE — 3700000000 HC ANESTHESIA ATTENDED CARE: Performed by: ORTHOPAEDIC SURGERY

## 2024-06-19 PROCEDURE — 97161 PT EVAL LOW COMPLEX 20 MIN: CPT

## 2024-06-19 PROCEDURE — 7100000001 HC PACU RECOVERY - ADDTL 15 MIN: Performed by: ORTHOPAEDIC SURGERY

## 2024-06-19 PROCEDURE — 3600000015 HC SURGERY LEVEL 5 ADDTL 15MIN: Performed by: ORTHOPAEDIC SURGERY

## 2024-06-19 PROCEDURE — 7100000000 HC PACU RECOVERY - FIRST 15 MIN: Performed by: ORTHOPAEDIC SURGERY

## 2024-06-19 PROCEDURE — C1776 JOINT DEVICE (IMPLANTABLE): HCPCS | Performed by: ORTHOPAEDIC SURGERY

## 2024-06-19 PROCEDURE — 6360000002 HC RX W HCPCS: Performed by: NURSE ANESTHETIST, CERTIFIED REGISTERED

## 2024-06-19 PROCEDURE — C1713 ANCHOR/SCREW BN/BN,TIS/BN: HCPCS | Performed by: ORTHOPAEDIC SURGERY

## 2024-06-19 PROCEDURE — A4217 STERILE WATER/SALINE, 500 ML: HCPCS | Performed by: ORTHOPAEDIC SURGERY

## 2024-06-19 PROCEDURE — 2500000003 HC RX 250 WO HCPCS: Performed by: NURSE ANESTHETIST, CERTIFIED REGISTERED

## 2024-06-19 DEVICE — PATELLA RESURFACING # 3 E-CROSS
Type: IMPLANTABLE DEVICE | Site: KNEE | Status: FUNCTIONAL
Brand: GMK

## 2024-06-19 DEVICE — CEMENT BNE 40GM W/ GENT HI VISC RADPQ FOR REV SURG: Type: IMPLANTABLE DEVICE | Site: KNEE | Status: FUNCTIONAL

## 2024-06-19 DEVICE — FIXED TIBIAL TRAY CEMENTED RIGHT, SIZE 5
Type: IMPLANTABLE DEVICE | Site: KNEE | Status: FUNCTIONAL
Brand: GMK PRIMARY TOTAL KNEE SYSTEM

## 2024-06-19 DEVICE — TIBIAL INSERT FIXED SPHERE FLEX   #5/10 MM R E-CROSS
Type: IMPLANTABLE DEVICE | Site: KNEE | Status: FUNCTIONAL
Brand: GMK SPHERE TOTAL KNEE SYSTEM

## 2024-06-19 RX ORDER — BISACODYL 5 MG/1
5 TABLET, DELAYED RELEASE ORAL DAILY PRN
Status: DISCONTINUED | OUTPATIENT
Start: 2024-06-19 | End: 2024-06-21 | Stop reason: HOSPADM

## 2024-06-19 RX ORDER — GLUCAGON 1 MG/ML
1 KIT INJECTION PRN
Status: DISCONTINUED | OUTPATIENT
Start: 2024-06-19 | End: 2024-06-21 | Stop reason: HOSPADM

## 2024-06-19 RX ORDER — TIZANIDINE 4 MG/1
4 TABLET ORAL 3 TIMES DAILY PRN
Status: DISCONTINUED | OUTPATIENT
Start: 2024-06-19 | End: 2024-06-21 | Stop reason: HOSPADM

## 2024-06-19 RX ORDER — HYDROXYZINE HYDROCHLORIDE 10 MG/1
10 TABLET, FILM COATED ORAL EVERY 8 HOURS PRN
Status: DISCONTINUED | OUTPATIENT
Start: 2024-06-19 | End: 2024-06-21 | Stop reason: HOSPADM

## 2024-06-19 RX ORDER — SODIUM CHLORIDE, SODIUM LACTATE, POTASSIUM CHLORIDE, CALCIUM CHLORIDE 600; 310; 30; 20 MG/100ML; MG/100ML; MG/100ML; MG/100ML
INJECTION, SOLUTION INTRAVENOUS CONTINUOUS
Status: DISCONTINUED | OUTPATIENT
Start: 2024-06-19 | End: 2024-06-19 | Stop reason: HOSPADM

## 2024-06-19 RX ORDER — DIPHENHYDRAMINE HYDROCHLORIDE 50 MG/ML
12.5 INJECTION INTRAMUSCULAR; INTRAVENOUS
Status: DISCONTINUED | OUTPATIENT
Start: 2024-06-19 | End: 2024-06-19 | Stop reason: HOSPADM

## 2024-06-19 RX ORDER — APREPITANT 40 MG/1
40 CAPSULE ORAL ONCE
Status: COMPLETED | OUTPATIENT
Start: 2024-06-19 | End: 2024-06-19

## 2024-06-19 RX ORDER — BUPIVACAINE HYDROCHLORIDE 5 MG/ML
INJECTION, SOLUTION EPIDURAL; INTRACAUDAL
Status: COMPLETED | OUTPATIENT
Start: 2024-06-19 | End: 2024-06-19

## 2024-06-19 RX ORDER — SODIUM CHLORIDE 9 MG/ML
INJECTION, SOLUTION INTRAVENOUS CONTINUOUS
Status: DISCONTINUED | OUTPATIENT
Start: 2024-06-19 | End: 2024-06-21 | Stop reason: HOSPADM

## 2024-06-19 RX ORDER — NALOXONE HYDROCHLORIDE 0.4 MG/ML
INJECTION, SOLUTION INTRAMUSCULAR; INTRAVENOUS; SUBCUTANEOUS PRN
Status: DISCONTINUED | OUTPATIENT
Start: 2024-06-19 | End: 2024-06-19 | Stop reason: HOSPADM

## 2024-06-19 RX ORDER — ACETAMINOPHEN 325 MG/1
650 TABLET ORAL EVERY 6 HOURS
Status: DISCONTINUED | OUTPATIENT
Start: 2024-06-19 | End: 2024-06-21 | Stop reason: HOSPADM

## 2024-06-19 RX ORDER — MAGNESIUM HYDROXIDE 1200 MG/15ML
LIQUID ORAL CONTINUOUS PRN
Status: COMPLETED | OUTPATIENT
Start: 2024-06-19 | End: 2024-06-19

## 2024-06-19 RX ORDER — SODIUM CHLORIDE 0.9 % (FLUSH) 0.9 %
5-40 SYRINGE (ML) INJECTION PRN
Status: DISCONTINUED | OUTPATIENT
Start: 2024-06-19 | End: 2024-06-19 | Stop reason: HOSPADM

## 2024-06-19 RX ORDER — GLYCOPYRROLATE 0.2 MG/ML
INJECTION INTRAMUSCULAR; INTRAVENOUS PRN
Status: DISCONTINUED | OUTPATIENT
Start: 2024-06-19 | End: 2024-06-19 | Stop reason: SDUPTHER

## 2024-06-19 RX ORDER — SODIUM CHLORIDE, SODIUM LACTATE, POTASSIUM CHLORIDE, CALCIUM CHLORIDE 600; 310; 30; 20 MG/100ML; MG/100ML; MG/100ML; MG/100ML
INJECTION, SOLUTION INTRAVENOUS CONTINUOUS PRN
Status: DISCONTINUED | OUTPATIENT
Start: 2024-06-19 | End: 2024-06-19 | Stop reason: SDUPTHER

## 2024-06-19 RX ORDER — OXYCODONE HYDROCHLORIDE 5 MG/1
5 TABLET ORAL EVERY 4 HOURS PRN
Status: DISCONTINUED | OUTPATIENT
Start: 2024-06-19 | End: 2024-06-20

## 2024-06-19 RX ORDER — DEXTROSE MONOHYDRATE 100 MG/ML
INJECTION, SOLUTION INTRAVENOUS CONTINUOUS PRN
Status: DISCONTINUED | OUTPATIENT
Start: 2024-06-19 | End: 2024-06-21 | Stop reason: HOSPADM

## 2024-06-19 RX ORDER — ROCURONIUM BROMIDE 10 MG/ML
INJECTION, SOLUTION INTRAVENOUS PRN
Status: DISCONTINUED | OUTPATIENT
Start: 2024-06-19 | End: 2024-06-19 | Stop reason: SDUPTHER

## 2024-06-19 RX ORDER — ACETAMINOPHEN 500 MG
500 TABLET ORAL EVERY 6 HOURS PRN
Status: DISCONTINUED | OUTPATIENT
Start: 2024-06-19 | End: 2024-06-21 | Stop reason: HOSPADM

## 2024-06-19 RX ORDER — ONDANSETRON 4 MG/1
4 TABLET, ORALLY DISINTEGRATING ORAL EVERY 8 HOURS PRN
Status: DISCONTINUED | OUTPATIENT
Start: 2024-06-19 | End: 2024-06-21 | Stop reason: HOSPADM

## 2024-06-19 RX ORDER — SODIUM CHLORIDE 9 MG/ML
INJECTION, SOLUTION INTRAVENOUS PRN
Status: DISCONTINUED | OUTPATIENT
Start: 2024-06-19 | End: 2024-06-19 | Stop reason: HOSPADM

## 2024-06-19 RX ORDER — LIDOCAINE HYDROCHLORIDE 20 MG/ML
INJECTION, SOLUTION INFILTRATION; PERINEURAL PRN
Status: DISCONTINUED | OUTPATIENT
Start: 2024-06-19 | End: 2024-06-19 | Stop reason: SDUPTHER

## 2024-06-19 RX ORDER — ASPIRIN 325 MG
325 TABLET, DELAYED RELEASE (ENTERIC COATED) ORAL 2 TIMES DAILY
Status: DISCONTINUED | OUTPATIENT
Start: 2024-06-19 | End: 2024-06-21 | Stop reason: HOSPADM

## 2024-06-19 RX ORDER — DEXAMETHASONE SODIUM PHOSPHATE 4 MG/ML
INJECTION, SOLUTION INTRA-ARTICULAR; INTRALESIONAL; INTRAMUSCULAR; INTRAVENOUS; SOFT TISSUE PRN
Status: DISCONTINUED | OUTPATIENT
Start: 2024-06-19 | End: 2024-06-19 | Stop reason: SDUPTHER

## 2024-06-19 RX ORDER — SODIUM CHLORIDE 0.9 % (FLUSH) 0.9 %
5-40 SYRINGE (ML) INJECTION EVERY 12 HOURS SCHEDULED
Status: DISCONTINUED | OUTPATIENT
Start: 2024-06-19 | End: 2024-06-21 | Stop reason: HOSPADM

## 2024-06-19 RX ORDER — GLIPIZIDE 5 MG/1
2.5 TABLET ORAL
Status: DISCONTINUED | OUTPATIENT
Start: 2024-06-20 | End: 2024-06-21 | Stop reason: HOSPADM

## 2024-06-19 RX ORDER — MEPERIDINE HYDROCHLORIDE 25 MG/ML
12.5 INJECTION INTRAMUSCULAR; INTRAVENOUS; SUBCUTANEOUS EVERY 5 MIN PRN
Status: DISCONTINUED | OUTPATIENT
Start: 2024-06-19 | End: 2024-06-19 | Stop reason: HOSPADM

## 2024-06-19 RX ORDER — ONDANSETRON 2 MG/ML
4 INJECTION INTRAMUSCULAR; INTRAVENOUS EVERY 6 HOURS PRN
Status: DISCONTINUED | OUTPATIENT
Start: 2024-06-19 | End: 2024-06-21 | Stop reason: HOSPADM

## 2024-06-19 RX ORDER — SODIUM CHLORIDE 0.9 % (FLUSH) 0.9 %
5-40 SYRINGE (ML) INJECTION EVERY 12 HOURS SCHEDULED
Status: DISCONTINUED | OUTPATIENT
Start: 2024-06-19 | End: 2024-06-19 | Stop reason: HOSPADM

## 2024-06-19 RX ORDER — LOSARTAN POTASSIUM 25 MG/1
25 TABLET ORAL DAILY
Status: DISCONTINUED | OUTPATIENT
Start: 2024-06-19 | End: 2024-06-21 | Stop reason: HOSPADM

## 2024-06-19 RX ORDER — OXYCODONE HYDROCHLORIDE 5 MG/1
10 TABLET ORAL PRN
Status: COMPLETED | OUTPATIENT
Start: 2024-06-19 | End: 2024-06-19

## 2024-06-19 RX ORDER — LABETALOL HYDROCHLORIDE 5 MG/ML
5 INJECTION, SOLUTION INTRAVENOUS EVERY 10 MIN PRN
Status: DISCONTINUED | OUTPATIENT
Start: 2024-06-19 | End: 2024-06-19 | Stop reason: HOSPADM

## 2024-06-19 RX ORDER — ONDANSETRON 2 MG/ML
4 INJECTION INTRAMUSCULAR; INTRAVENOUS
Status: DISCONTINUED | OUTPATIENT
Start: 2024-06-19 | End: 2024-06-19 | Stop reason: HOSPADM

## 2024-06-19 RX ORDER — FOLIC ACID 1 MG/1
1 TABLET ORAL DAILY
Status: DISCONTINUED | OUTPATIENT
Start: 2024-06-19 | End: 2024-06-21 | Stop reason: HOSPADM

## 2024-06-19 RX ORDER — SODIUM CHLORIDE 9 MG/ML
INJECTION, SOLUTION INTRAVENOUS PRN
Status: DISCONTINUED | OUTPATIENT
Start: 2024-06-19 | End: 2024-06-21 | Stop reason: HOSPADM

## 2024-06-19 RX ORDER — OXYCODONE HYDROCHLORIDE 5 MG/1
5 TABLET ORAL PRN
Status: COMPLETED | OUTPATIENT
Start: 2024-06-19 | End: 2024-06-19

## 2024-06-19 RX ORDER — SODIUM CHLORIDE 0.9 % (FLUSH) 0.9 %
5-40 SYRINGE (ML) INJECTION PRN
Status: DISCONTINUED | OUTPATIENT
Start: 2024-06-19 | End: 2024-06-21 | Stop reason: HOSPADM

## 2024-06-19 RX ORDER — SENNA AND DOCUSATE SODIUM 50; 8.6 MG/1; MG/1
1 TABLET, FILM COATED ORAL 2 TIMES DAILY
Status: DISCONTINUED | OUTPATIENT
Start: 2024-06-19 | End: 2024-06-21 | Stop reason: HOSPADM

## 2024-06-19 RX ORDER — PROPOFOL 10 MG/ML
INJECTION, EMULSION INTRAVENOUS PRN
Status: DISCONTINUED | OUTPATIENT
Start: 2024-06-19 | End: 2024-06-19 | Stop reason: SDUPTHER

## 2024-06-19 RX ORDER — ESCITALOPRAM OXALATE 10 MG/1
10 TABLET ORAL DAILY
Status: DISCONTINUED | OUTPATIENT
Start: 2024-06-19 | End: 2024-06-21 | Stop reason: HOSPADM

## 2024-06-19 RX ORDER — FENTANYL CITRATE 50 UG/ML
INJECTION, SOLUTION INTRAMUSCULAR; INTRAVENOUS PRN
Status: DISCONTINUED | OUTPATIENT
Start: 2024-06-19 | End: 2024-06-19 | Stop reason: SDUPTHER

## 2024-06-19 RX ORDER — KETOROLAC TROMETHAMINE 30 MG/ML
30 INJECTION, SOLUTION INTRAMUSCULAR; INTRAVENOUS EVERY 6 HOURS
Status: DISCONTINUED | OUTPATIENT
Start: 2024-06-19 | End: 2024-06-21 | Stop reason: HOSPADM

## 2024-06-19 RX ORDER — ONDANSETRON 2 MG/ML
INJECTION INTRAMUSCULAR; INTRAVENOUS PRN
Status: DISCONTINUED | OUTPATIENT
Start: 2024-06-19 | End: 2024-06-19 | Stop reason: SDUPTHER

## 2024-06-19 RX ORDER — LIDOCAINE HYDROCHLORIDE 10 MG/ML
1 INJECTION, SOLUTION EPIDURAL; INFILTRATION; INTRACAUDAL; PERINEURAL
Status: DISCONTINUED | OUTPATIENT
Start: 2024-06-19 | End: 2024-06-19 | Stop reason: HOSPADM

## 2024-06-19 RX ORDER — ROSUVASTATIN CALCIUM 10 MG/1
10 TABLET, COATED ORAL NIGHTLY
Status: DISCONTINUED | OUTPATIENT
Start: 2024-06-19 | End: 2024-06-21 | Stop reason: HOSPADM

## 2024-06-19 RX ADMIN — DEXAMETHASONE SODIUM PHOSPHATE 8 MG: 4 INJECTION, SOLUTION INTRAMUSCULAR; INTRAVENOUS at 08:38

## 2024-06-19 RX ADMIN — LIDOCAINE HYDROCHLORIDE 60 MG: 20 INJECTION, SOLUTION INFILTRATION; PERINEURAL at 08:28

## 2024-06-19 RX ADMIN — HYDROMORPHONE HYDROCHLORIDE 0.5 MG: 1 INJECTION, SOLUTION INTRAMUSCULAR; INTRAVENOUS; SUBCUTANEOUS at 14:23

## 2024-06-19 RX ADMIN — SODIUM CHLORIDE, POTASSIUM CHLORIDE, SODIUM LACTATE AND CALCIUM CHLORIDE: 600; 310; 30; 20 INJECTION, SOLUTION INTRAVENOUS at 07:52

## 2024-06-19 RX ADMIN — PROPOFOL 25 MG: 10 INJECTION, EMULSION INTRAVENOUS at 07:53

## 2024-06-19 RX ADMIN — SODIUM CHLORIDE 2000 MG: 900 INJECTION INTRAVENOUS at 08:26

## 2024-06-19 RX ADMIN — FOLIC ACID 1 MG: 1 TABLET ORAL at 17:03

## 2024-06-19 RX ADMIN — FENTANYL CITRATE 25 MCG: 50 INJECTION INTRAMUSCULAR; INTRAVENOUS at 08:34

## 2024-06-19 RX ADMIN — SODIUM CHLORIDE: 9 INJECTION, SOLUTION INTRAVENOUS at 17:11

## 2024-06-19 RX ADMIN — ESCITALOPRAM OXALATE 10 MG: 10 TABLET ORAL at 17:03

## 2024-06-19 RX ADMIN — PROPOFOL 300 MG: 10 INJECTION, EMULSION INTRAVENOUS at 08:28

## 2024-06-19 RX ADMIN — BUPIVACAINE HYDROCHLORIDE 20 ML: 5 INJECTION, SOLUTION EPIDURAL; INTRACAUDAL; PERINEURAL at 07:53

## 2024-06-19 RX ADMIN — SODIUM CHLORIDE 2000 MG: 900 INJECTION INTRAVENOUS at 17:20

## 2024-06-19 RX ADMIN — HYDROMORPHONE HYDROCHLORIDE 0.5 MG: 1 INJECTION, SOLUTION INTRAMUSCULAR; INTRAVENOUS; SUBCUTANEOUS at 19:12

## 2024-06-19 RX ADMIN — ROSUVASTATIN CALCIUM 10 MG: 10 TABLET, FILM COATED ORAL at 21:05

## 2024-06-19 RX ADMIN — SODIUM CHLORIDE 2000 MG: 900 INJECTION INTRAVENOUS at 23:51

## 2024-06-19 RX ADMIN — LOSARTAN POTASSIUM 25 MG: 25 TABLET, FILM COATED ORAL at 17:03

## 2024-06-19 RX ADMIN — KETOROLAC TROMETHAMINE 30 MG: 30 INJECTION, SOLUTION INTRAMUSCULAR at 21:04

## 2024-06-19 RX ADMIN — FENTANYL CITRATE 25 MCG: 50 INJECTION INTRAMUSCULAR; INTRAVENOUS at 08:54

## 2024-06-19 RX ADMIN — ONDANSETRON 4 MG: 2 INJECTION INTRAMUSCULAR; INTRAVENOUS at 10:14

## 2024-06-19 RX ADMIN — ROCURONIUM BROMIDE 5 MG: 10 INJECTION INTRAVENOUS at 09:33

## 2024-06-19 RX ADMIN — KETOROLAC TROMETHAMINE 30 MG: 30 INJECTION, SOLUTION INTRAMUSCULAR at 17:03

## 2024-06-19 RX ADMIN — ONDANSETRON 4 MG: 2 INJECTION INTRAMUSCULAR; INTRAVENOUS at 11:27

## 2024-06-19 RX ADMIN — ROCURONIUM BROMIDE 5 MG: 10 INJECTION INTRAVENOUS at 09:05

## 2024-06-19 RX ADMIN — SODIUM CHLORIDE, POTASSIUM CHLORIDE, SODIUM LACTATE AND CALCIUM CHLORIDE: 600; 310; 30; 20 INJECTION, SOLUTION INTRAVENOUS at 07:40

## 2024-06-19 RX ADMIN — FENTANYL CITRATE 25 MCG: 50 INJECTION INTRAMUSCULAR; INTRAVENOUS at 09:05

## 2024-06-19 RX ADMIN — ACETAMINOPHEN 650 MG: 325 TABLET ORAL at 21:05

## 2024-06-19 RX ADMIN — FENTANYL CITRATE 25 MCG: 50 INJECTION INTRAMUSCULAR; INTRAVENOUS at 10:23

## 2024-06-19 RX ADMIN — FENTANYL CITRATE 25 MCG: 50 INJECTION INTRAMUSCULAR; INTRAVENOUS at 08:49

## 2024-06-19 RX ADMIN — SODIUM CHLORIDE, POTASSIUM CHLORIDE, SODIUM LACTATE AND CALCIUM CHLORIDE: 600; 310; 30; 20 INJECTION, SOLUTION INTRAVENOUS at 08:24

## 2024-06-19 RX ADMIN — ASPIRIN 325 MG: 325 TABLET, COATED ORAL at 21:05

## 2024-06-19 RX ADMIN — PROPOFOL 25 MG: 10 INJECTION, EMULSION INTRAVENOUS at 07:56

## 2024-06-19 RX ADMIN — OXYCODONE HYDROCHLORIDE 5 MG: 5 TABLET ORAL at 21:11

## 2024-06-19 RX ADMIN — HYDROMORPHONE HYDROCHLORIDE 0.5 MG: 1 INJECTION, SOLUTION INTRAMUSCULAR; INTRAVENOUS; SUBCUTANEOUS at 23:55

## 2024-06-19 RX ADMIN — SODIUM CHLORIDE, POTASSIUM CHLORIDE, SODIUM LACTATE AND CALCIUM CHLORIDE: 600; 310; 30; 20 INJECTION, SOLUTION INTRAVENOUS at 09:27

## 2024-06-19 RX ADMIN — ROCURONIUM BROMIDE 5 MG: 10 INJECTION INTRAVENOUS at 09:01

## 2024-06-19 RX ADMIN — OXYCODONE HYDROCHLORIDE 5 MG: 5 TABLET ORAL at 12:40

## 2024-06-19 RX ADMIN — APREPITANT 40 MG: 40 CAPSULE ORAL at 07:42

## 2024-06-19 RX ADMIN — GLYCOPYRROLATE 0.2 MG: 0.2 INJECTION INTRAMUSCULAR; INTRAVENOUS at 08:28

## 2024-06-19 RX ADMIN — ACETAMINOPHEN 650 MG: 325 TABLET ORAL at 17:02

## 2024-06-19 ASSESSMENT — PAIN SCALES - GENERAL
PAINLEVEL_OUTOF10: 7
PAINLEVEL_OUTOF10: 5
PAINLEVEL_OUTOF10: 8
PAINLEVEL_OUTOF10: 5
PAINLEVEL_OUTOF10: 9
PAINLEVEL_OUTOF10: 6
PAINLEVEL_OUTOF10: 7
PAINLEVEL_OUTOF10: 8

## 2024-06-19 ASSESSMENT — PAIN DESCRIPTION - DESCRIPTORS
DESCRIPTORS: STABBING
DESCRIPTORS: STABBING
DESCRIPTORS: ACHING;DISCOMFORT
DESCRIPTORS: ACHING;TIGHTNESS
DESCRIPTORS: OTHER (COMMENT)

## 2024-06-19 ASSESSMENT — PAIN DESCRIPTION - ORIENTATION
ORIENTATION: RIGHT

## 2024-06-19 ASSESSMENT — PAIN DESCRIPTION - LOCATION
LOCATION: KNEE
LOCATION: LEG
LOCATION: KNEE
LOCATION: KNEE

## 2024-06-19 ASSESSMENT — PAIN - FUNCTIONAL ASSESSMENT
PAIN_FUNCTIONAL_ASSESSMENT: 0-10
PAIN_FUNCTIONAL_ASSESSMENT: NONE - DENIES PAIN

## 2024-06-19 NOTE — FLOWSHEET NOTE
06/19/24 1530   Vital Signs   Temp 97.6 °F (36.4 °C)   Temp Source Oral   Pulse 96   Heart Rate Source Monitor   Respirations 18   BP (!) 144/97   MAP (Calculated) 113   BP Location Left upper arm   BP Method Automatic   Patient Position Semi fowlers   Pain Assessment   Pain Assessment 0-10   Opioid-Induced Sedation   POSS Score 1   RASS   Reardon Agitation Sedation Scale (RASS) 0   Oxygen Therapy   SpO2 93 %   O2 Device None (Room air)     4 Eyes Skin Assessment     NAME:  Nakul Parkinson  YOB: 1965  MEDICAL RECORD NUMBER:  [id number]    The patient is being assessed for  Admission    I agree that at least one RN has performed a thorough Head to Toe Skin Assessment on the patient. ALL assessment sites listed below have been assessed.      Areas assessed by both nurses:    Head, Face, Ears, Shoulders, Back, Chest, Arms, Elbows, Hands, Sacrum. Buttock, Coccyx, Ischium, Legs. Feet and Heels, and Under Medical Devices         Does the Patient have a Wound? No noted wound(s)    Scattered bruises and abrasions, surgical incision on right knee       Keagan Prevention initiated by RN: No  Wound Care Orders initiated by RN: yes    Pressure Injury (Stage 3,4, Unstageable, DTI, NWPT, and Complex wounds) if present, place Wound referral order by RN under : Yes    New Ostomies, if present place, Ostomy referral order under : No     Nurse 1 eSignature: Electronically signed by Criselda Myles RN (Mandy) on 6/19/24 at 5:30 PM EDT    **SHARE this note so that the co-signing nurse can place an eSignature**    Nurse 2 eSignature: Electronically signed by PRASAD DURAN RN on 6/19/24 at 5:31 PM EDT    audio

## 2024-06-19 NOTE — ANESTHESIA POSTPROCEDURE EVALUATION
Adequate analgesia    Anesthesia Complications:  No apparent anesthetic complications    SUMMARY      Vital signs stable  OK to discharge from Stage I post anesthesia care.  Care transferred from Anesthesiology department on discharge from perioperative area     No notable events documented.

## 2024-06-19 NOTE — PROGRESS NOTES
Occupational Therapy  Order received and chart reviewed. Patient just arrived to the floor from the PACU. Patient requesting to eat before participating in therapy. OT will follow up later this date or tomorrow as appropriate.       Porsche Goldman, OTR/L #[id number]

## 2024-06-19 NOTE — OP NOTE
[address] [address]                            OPERATIVE REPORT      PATIENT NAME: TIGRE HARDEN              : 1965  MED REC NO: [id number]                      ROOM: OR Pool  ACCOUNT NO: [id number]                       ADMIT DATE: 2024  PROVIDER: Hernando Iqbal MD      DATE OF PROCEDURE:  2024    SURGEON:  Hernando Iqbal MD    PREOPERATIVE DIAGNOSIS:  Right knee osteoarthritis.    POSTOPERATIVE DIAGNOSIS:  Right knee osteoarthritis.    OPERATION PERFORMED:  Right total knee replacement.    ANESTHESIA:  General with leg block.    BLOOD LOSS:  Less than 50 mL.    COMPLICATIONS:  None noted.    INDICATIONS FOR OPERATION:  This 59-year-old male presented with a history, exam, and testing consistent with severe right knee osteoarthritis.  After failure of other modalities of care including arthroscopy and subchondroplasty with injections, viscosupplementation, therapy, and bracing, and with persistent pain that was interfering with the quality of his life, he elected for the recommendation of surgical intervention.  His x-ray did confirm severe osteoarthritis tricompartmentally.  After discussing treatment options, he elected for the recommendation of total knee replacement as a surgical option.    DESCRIPTION OF OPERATION:  The patient was taken to the operating room.  After a leg block had been performed, then antibiotics had been given IV piggyback preoperatively.  General anesthetic was obtained, and a tourniquet was placed around the right proximal thigh.  The knee and leg were sterilely prepped and draped.  The leg was exsanguinated with an Esmarch, and the tourniquet was inflated to 350 mmHg.  A longitudinal incision was made over the anterior knee over a span of 7.5 inches.  Soft tissue was dissected down through skin and subcutaneous tissue, where a medial parapatellar arthrotomy was performed.

## 2024-06-19 NOTE — PROGRESS NOTES
Patient transferred via bed in stable condition with all belongings to room 204.Eugenia Victoria RN

## 2024-06-19 NOTE — BRIEF OP NOTE
Brief Postoperative Note      Patient: Nakul Parkinson  YOB: 1965  MRN: [id number]    Date of Procedure: 6/19/2024    Pre-Op Diagnosis Codes:     * Osteoarthritis of right knee [M17.11]    Post-Op Diagnosis: Same       Procedure(s):  RIGHT KNEE TOTAL ARTHROPLASTY -GENERAL-BLOCK-23 HOUR HOLD-    Surgeon(s):  eHrnando Iqbal MD    Assistant:  Surgical Assistant: Nakul Puente    Anesthesia: General    Estimated Blood Loss (mL): Minimal    Complications: None    Specimens:   * No specimens in log *    Implants:  Implant Name Type Inv. Item Serial No.  Lot No. LRB No. Used Action   CEMENT BNE 40GM W/ GENT HI VISC RADPQ FOR REV SURG - [id number]  CEMENT BNE 40GM W/ GENT HI VISC RADPQ FOR REV SURG  [employer] [id number] Right 2 Implanted   GMK-SPHERE TIBIAL INSERT E-CROSS FLEC 5R - 10MM - [id number]  GMK-SPHERE TIBIAL INSERT E-CROSS FLEC 5R - 10MM  [employer]WD [id number] Right 1 Implanted   COMPONENT TIB SZ 5 RT FIX SHIVANI GMK - [id number]  COMPONENT TIB SZ 5 RT FIX SHIVANI GMK  [employer]WD [id number] Right 1 Implanted   COMPONENT PATELLAR STRL GMK SPHR - [id number]  COMPONENT PATELLAR STRL GMK SPHR  [employer]WD [id number] Right 1 Implanted   medacta sphere primary femoral component right sz6     [id number] Right 1 Implanted         Drains: * No LDAs found *    Findings:  Infection Present At Time Of Surgery (PATOS) (choose all levels that have infection present):  No infection present  Other Findings: NONE    Electronically signed by Hernando Iqbal MD on 6/19/2024 at 10:16 AM

## 2024-06-19 NOTE — H&P
Update History & Physical    The patient's History and Physical  was reviewed with the patient and I examined the patient. There was no change. The surgical site was confirmed by the patient and me.       Plan: The risks, benefits, expected outcome, and alternative to the recommended procedure have been discussed with the patient. Patient understands and wants to proceed with the procedure.         Electronically signed by Hernando Iqbal MD on 6/19/2024 at 7:50 AM

## 2024-06-19 NOTE — DISCHARGE INSTRUCTIONS
Follow up with Dr. Iqbal per arrangements.  Weight bearing 50% x 2 days with crutches, then as tolerated.  Remove dressing in 2-3 days, then may shower.  Ice and elevate knee as tolerated.      Your information:  Name: Nakul Parkinson  : 1965    Your instructions:    Follow instructions above.    What to do after you leave the hospital:    Recommended diet: regular diet    Recommended activity: activity as tolerated        The following personal items were collected during your admission and were returned to you:    Belongings  Dental Appliances: None  Vision - Corrective Lenses: None  Hearing Aid: None  Clothing: Shirt, At bedside, Shorts, Socks, Footwear  Jewelry: None  Body Piercings Removed: N/A  Electronic Devices: Cell Phone  Weapons (Notify Protective Services/Security): None  Home Medications: None  Valuables Given To: Other (Comment) (n/a)  Provide Name(s) of Who Valuable(s) Were Given To: n/a  Responsible person(s) in the waiting room: Yfn schuler [phone]  Patient approves for provider to speak to responsible person post operatively: Yes    Information obtained by:  By signing below, I understand that if any problems occur once I leave the hospital I am to contact MD.  I understand and acknowledge receipt of the instructions indicated above.

## 2024-06-19 NOTE — ANESTHESIA PRE PROCEDURE
Department of Anesthesiology  Preprocedure Note       Name:  Nakul Parkinson   Age:  59 y.o.  :  1965                                          MRN:  [id number]         Date:  2024      Surgeon: Surgeon(s):  Hernando Iqbal MD    Procedure: Procedure(s):  RIGHT KNEE TOTAL ARTHROPLASTY -GENERAL-BLOCK-23 HOUR HOLD-    Medications prior to admission:   Prior to Admission medications    Medication Sig Start Date End Date Taking? Authorizing Provider   tiZANidine (ZANAFLEX) 4 MG tablet Take 1 tablet by mouth 3 times daily as needed (3 times a day) 24   Hernando Iqbal MD   acetaminophen (TYLENOL) 500 MG tablet Take 1 tablet by mouth every 6 hours as needed for Pain 24   Hernando Iqbal MD   glimepiride (AMARYL) 1 MG tablet TAKE ONE TABLET BY MOUTH EVERY MORNING BEFORE BREAKFAST 24   Hernando Wing MD   rosuvastatin (CRESTOR) 10 MG tablet TAKE ONE TABLET BY MOUTH DAILY 24   Hernando Wing MD   escitalopram (LEXAPRO) 10 MG tablet TAKE ONE TABLET BY MOUTH DAILY 24   Hernando Wing MD   losartan (COZAAR) 25 MG tablet TAKE ONE TABLET BY MOUTH DAILY 24   Hernando Wing MD   oxyCODONE (ROXICODONE) 5 MG immediate release tablet  23   Jayce Loredo MD   nabumetone (RELAFEN) 500 MG tablet Take 1 tablet by mouth 2 times daily as needed for Pain 3/13/23   Hernando Wing MD   folic acid (FOLVITE) 1 MG tablet Take 1 tablet by mouth daily    Jayce Loredo MD   CLOMIPHENE CITRATE PO Take by mouth From Jayce Salgado MD   tadalafil (CIALIS) 5 MG tablet Take 1 tablet by mouth as needed for Erectile Dysfunction    Jayce Loredo MD   traMADol (ULTRAM) 50 MG tablet Take 1 tablet by mouth every 6 hours as needed.    Jayce Loredo MD   aspirin 81 MG tablet Take 1 tablet by mouth daily    Jayce Loredo MD       Current medications:    Current Facility-Administered

## 2024-06-19 NOTE — PLAN OF CARE
Problem: Chronic Conditions and Co-morbidities  Goal: Patient's chronic conditions and co-morbidity symptoms are monitored and maintained or improved  Outcome: Progressing     Problem: Discharge Planning  Goal: Discharge to home or other facility with appropriate resources  Outcome: Progressing     Problem: Safety - Adult  Goal: Free from fall injury  Outcome: Progressing     Problem: Pain  Goal: Verbalizes/displays adequate comfort level or baseline comfort level  Outcome: Progressing     Problem: ABCDS Injury Assessment  Goal: Absence of physical injury  Outcome: Progressing

## 2024-06-19 NOTE — PROGRESS NOTES
Handoff report and transfer of care given at bedside to dagmar.  Patient in stable condition, denies needs/concerns at this time.  Call light within reach.

## 2024-06-19 NOTE — PROGRESS NOTES
Inpatient Physical Therapy Evaluation & Treatment    Unit: Same Day Surgery Overflow  Date:  6/19/2024  Patient Name:    Nakul Parkinson  Admitting diagnosis:  Osteoarthritis of right knee [M17.11]  Primary osteoarthritis of right knee [M17.11]  Admit Date:  6/19/2024  Precautions/Restrictions/WB Status/ Lines/ Wounds/ Oxygen: Fall risk, Bed/chair alarm, Lines (IV), and Knee immobilizer    Treatment Time:  5900-8082  Treatment Number:  1   Timed Code Treatment Minutes: 30 minutes  Total Treatment Minutes:  40  minutes    Patient Stated Goals for Therapy: \" to walk \"          Discharge Recommendations: Home with 24hr supervision  DME needs for discharge: Continue to Assess       Therapy recommendation for EMS Transport: requires transport by cot due to knee immobilizer    Therapy recommendations for staff:   Assist of 1 for ambulation with use of rolling walker (RW) and gait belt to/from chair  to/from bathroom    History of Present Illness:   59-year-old male presented with a history, exam, and testing consistent with severe right knee osteoarthritis. After failure of other modalities of care including arthroscopy and subchondroplasty with injections, viscosupplementation, therapy, and bracing, and with persistent pain that was interfering with the quality of his life, he elected for the recommendation of surgical intervention. His x-ray did confirm severe osteoarthritis tricompartmentally. After discussing treatment options, he elected for the recommendation of total knee replacement as a surgical option.     AM-PAC Mobility Score       AM-PAC Inpatient Mobility without Stair Climbing Raw Score : 17      Subjective  Patient lying reclined in bed with  friend present .  Pt agreeable to this PT session.     Cognition    A&O x4   Able to follow 2 step commands    Pain   Yes  Location: R knee  Rating: severe /10  Pain Medicine Status: Pain med requested and RN notified    Preadmission Environment:   Pt lives

## 2024-06-19 NOTE — ANESTHESIA PROCEDURE NOTES
Peripheral Block    Patient location during procedure: pre-op  Reason for block: post-op pain management and at surgeon's request  Start time: 6/19/2024 7:53 AM  End time: 6/19/2024 8:00 AM  Staffing  Performed: resident/CRNA   Anesthesiologist: Kamila Chase MD  Resident/CRNA: Sergio Ayon APRN - CRNA  Performed by: Sergio Ayon APRN - CRNA  Authorized by: Sergio Ayon APRN - CRNA    Preanesthetic Checklist  Completed: patient identified, IV checked, site marked, risks and benefits discussed, surgical/procedural consents, equipment checked, pre-op evaluation, timeout performed, anesthesia consent given, oxygen available, monitors applied/VS acknowledged, fire risk safety assessment completed and verbalized and blood product R/B/A discussed and consented  Peripheral Block   Patient position: supine  Prep: ChloraPrep  Provider prep: mask, sterile gloves and sterile gown  Patient monitoring: cardiac monitor, continuous pulse ox, continuous capnometry, frequent blood pressure checks, IV access, oxygen and responsive to questions  Block type: Femoral  Adductor canal  Laterality: right  Injection technique: single-shot  Guidance: ultrasound guided  Local infiltration: lidocaine  Infiltration strength: 2 %  Local infiltration: lidocaine  Dose: 3 mL    Needle   Needle type: insulated echogenic nerve stimulator needle   Needle localization: anatomical landmarks and ultrasound guidance  Assessment   Injection assessment: negative aspiration for heme, no paresthesia on injection, local visualized surrounding nerve on ultrasound and no intravascular symptoms  Paresthesia pain: none  Slow fractionated injection: yes  Hemodynamics: stable  Outcomes: uncomplicated and patient tolerated procedure well    Medications Administered  BUPivacaine (MARCAINE) PF injection 0.5% - Perineural   20 mL - 6/19/2024 7:53:00 AM

## 2024-06-20 LAB
BASOPHILS # BLD: 0 K/UL (ref 0–0.2)
BASOPHILS NFR BLD: 0.2 %
DEPRECATED RDW RBC AUTO: 14.1 % (ref 12.4–15.4)
EOSINOPHIL # BLD: 0 K/UL (ref 0–0.6)
EOSINOPHIL NFR BLD: 0.1 %
GLUCOSE BLD-MCNC: 112 MG/DL (ref 70–99)
GLUCOSE BLD-MCNC: 112 MG/DL (ref 70–99)
GLUCOSE BLD-MCNC: 146 MG/DL (ref 70–99)
GLUCOSE BLD-MCNC: 199 MG/DL (ref 70–99)
HCT VFR BLD AUTO: 38.6 % (ref 40.5–52.5)
HGB BLD-MCNC: 12.8 G/DL (ref 13.5–17.5)
LYMPHOCYTES # BLD: 1.5 K/UL (ref 1–5.1)
LYMPHOCYTES NFR BLD: 11.2 %
MCH RBC QN AUTO: 30 PG (ref 26–34)
MCHC RBC AUTO-ENTMCNC: 33.2 G/DL (ref 31–36)
MCV RBC AUTO: 90.1 FL (ref 80–100)
MONOCYTES # BLD: 1.1 K/UL (ref 0–1.3)
MONOCYTES NFR BLD: 8.3 %
NEUTROPHILS # BLD: 10.8 K/UL (ref 1.7–7.7)
NEUTROPHILS NFR BLD: 80.2 %
PERFORMED ON: ABNORMAL
PLATELET # BLD AUTO: 171 K/UL (ref 135–450)
PMV BLD AUTO: 8.4 FL (ref 5–10.5)
RBC # BLD AUTO: 4.28 M/UL (ref 4.2–5.9)
WBC # BLD AUTO: 13.4 K/UL (ref 4–11)

## 2024-06-20 PROCEDURE — 97530 THERAPEUTIC ACTIVITIES: CPT

## 2024-06-20 PROCEDURE — 6360000002 HC RX W HCPCS: Performed by: ORTHOPAEDIC SURGERY

## 2024-06-20 PROCEDURE — 6370000000 HC RX 637 (ALT 250 FOR IP): Performed by: ORTHOPAEDIC SURGERY

## 2024-06-20 PROCEDURE — 97110 THERAPEUTIC EXERCISES: CPT

## 2024-06-20 PROCEDURE — 97535 SELF CARE MNGMENT TRAINING: CPT

## 2024-06-20 PROCEDURE — 97165 OT EVAL LOW COMPLEX 30 MIN: CPT

## 2024-06-20 PROCEDURE — 85025 COMPLETE CBC W/AUTO DIFF WBC: CPT

## 2024-06-20 PROCEDURE — 2580000003 HC RX 258: Performed by: ORTHOPAEDIC SURGERY

## 2024-06-20 PROCEDURE — 97116 GAIT TRAINING THERAPY: CPT

## 2024-06-20 PROCEDURE — 99024 POSTOP FOLLOW-UP VISIT: CPT | Performed by: PHYSICIAN ASSISTANT

## 2024-06-20 PROCEDURE — 36415 COLL VENOUS BLD VENIPUNCTURE: CPT

## 2024-06-20 RX ORDER — OXYCODONE HYDROCHLORIDE 5 MG/1
5 TABLET ORAL EVERY 4 HOURS PRN
Status: DISCONTINUED | OUTPATIENT
Start: 2024-06-20 | End: 2024-06-21 | Stop reason: HOSPADM

## 2024-06-20 RX ORDER — ASPIRIN 325 MG
325 TABLET, DELAYED RELEASE (ENTERIC COATED) ORAL 2 TIMES DAILY
Qty: 60 TABLET | Refills: 0 | Status: SHIPPED | OUTPATIENT
Start: 2024-06-20

## 2024-06-20 RX ORDER — OXYCODONE HYDROCHLORIDE 5 MG/1
5 TABLET ORAL EVERY 4 HOURS PRN
Qty: 28 TABLET | Refills: 0 | Status: SHIPPED | OUTPATIENT
Start: 2024-06-20 | End: 2024-06-27

## 2024-06-20 RX ADMIN — ASPIRIN 325 MG: 325 TABLET, COATED ORAL at 19:27

## 2024-06-20 RX ADMIN — ASPIRIN 325 MG: 325 TABLET, COATED ORAL at 08:11

## 2024-06-20 RX ADMIN — ACETAMINOPHEN 650 MG: 325 TABLET ORAL at 16:25

## 2024-06-20 RX ADMIN — FOLIC ACID 1 MG: 1 TABLET ORAL at 08:11

## 2024-06-20 RX ADMIN — LOSARTAN POTASSIUM 25 MG: 25 TABLET, FILM COATED ORAL at 08:11

## 2024-06-20 RX ADMIN — KETOROLAC TROMETHAMINE 30 MG: 30 INJECTION, SOLUTION INTRAMUSCULAR at 05:02

## 2024-06-20 RX ADMIN — DOCUSATE SODIUM AND SENNOSIDES 1 TABLET: 8.6; 5 TABLET, FILM COATED ORAL at 08:11

## 2024-06-20 RX ADMIN — KETOROLAC TROMETHAMINE 30 MG: 30 INJECTION, SOLUTION INTRAMUSCULAR at 21:27

## 2024-06-20 RX ADMIN — Medication 10 ML: at 19:27

## 2024-06-20 RX ADMIN — GLIPIZIDE 2.5 MG: 5 TABLET ORAL at 05:02

## 2024-06-20 RX ADMIN — TIZANIDINE 4 MG: 4 TABLET ORAL at 19:29

## 2024-06-20 RX ADMIN — OXYCODONE HYDROCHLORIDE 5 MG: 5 TABLET ORAL at 14:18

## 2024-06-20 RX ADMIN — ROSUVASTATIN CALCIUM 10 MG: 10 TABLET, FILM COATED ORAL at 19:27

## 2024-06-20 RX ADMIN — OXYCODONE HYDROCHLORIDE 5 MG: 5 TABLET ORAL at 19:30

## 2024-06-20 RX ADMIN — KETOROLAC TROMETHAMINE 30 MG: 30 INJECTION, SOLUTION INTRAMUSCULAR at 10:02

## 2024-06-20 RX ADMIN — OXYCODONE HYDROCHLORIDE 5 MG: 5 TABLET ORAL at 08:10

## 2024-06-20 RX ADMIN — ACETAMINOPHEN 650 MG: 325 TABLET ORAL at 10:02

## 2024-06-20 RX ADMIN — KETOROLAC TROMETHAMINE 30 MG: 30 INJECTION, SOLUTION INTRAMUSCULAR at 16:24

## 2024-06-20 RX ADMIN — ACETAMINOPHEN 650 MG: 325 TABLET ORAL at 21:27

## 2024-06-20 RX ADMIN — ESCITALOPRAM OXALATE 10 MG: 10 TABLET ORAL at 08:11

## 2024-06-20 RX ADMIN — ACETAMINOPHEN 650 MG: 325 TABLET ORAL at 05:01

## 2024-06-20 RX ADMIN — DOCUSATE SODIUM AND SENNOSIDES 1 TABLET: 8.6; 5 TABLET, FILM COATED ORAL at 19:27

## 2024-06-20 ASSESSMENT — PAIN SCALES - GENERAL
PAINLEVEL_OUTOF10: 7
PAINLEVEL_OUTOF10: 5
PAINLEVEL_OUTOF10: 7
PAINLEVEL_OUTOF10: 2
PAINLEVEL_OUTOF10: 4
PAINLEVEL_OUTOF10: 5
PAINLEVEL_OUTOF10: 4
PAINLEVEL_OUTOF10: 7
PAINLEVEL_OUTOF10: 6
PAINLEVEL_OUTOF10: 9
PAINLEVEL_OUTOF10: 6

## 2024-06-20 ASSESSMENT — PAIN - FUNCTIONAL ASSESSMENT
PAIN_FUNCTIONAL_ASSESSMENT: PREVENTS OR INTERFERES SOME ACTIVE ACTIVITIES AND ADLS
PAIN_FUNCTIONAL_ASSESSMENT: ACTIVITIES ARE NOT PREVENTED
PAIN_FUNCTIONAL_ASSESSMENT: PREVENTS OR INTERFERES SOME ACTIVE ACTIVITIES AND ADLS
PAIN_FUNCTIONAL_ASSESSMENT: PREVENTS OR INTERFERES SOME ACTIVE ACTIVITIES AND ADLS

## 2024-06-20 ASSESSMENT — PAIN DESCRIPTION - LOCATION
LOCATION: KNEE

## 2024-06-20 ASSESSMENT — PAIN DESCRIPTION - DESCRIPTORS
DESCRIPTORS: THROBBING;ACHING;SHARP
DESCRIPTORS: STABBING
DESCRIPTORS: ACHING
DESCRIPTORS: CRAMPING;STABBING
DESCRIPTORS: ACHING
DESCRIPTORS: STABBING

## 2024-06-20 ASSESSMENT — PAIN DESCRIPTION - FREQUENCY
FREQUENCY: CONTINUOUS

## 2024-06-20 ASSESSMENT — PAIN DESCRIPTION - ORIENTATION
ORIENTATION: RIGHT

## 2024-06-20 ASSESSMENT — PAIN DESCRIPTION - ONSET
ONSET: ON-GOING

## 2024-06-20 ASSESSMENT — PAIN DESCRIPTION - PAIN TYPE
TYPE: SURGICAL PAIN

## 2024-06-20 NOTE — CARE COORDINATION
Case Management Assessment  Initial Evaluation    Date/Time of Evaluation: 6/20/2024 10:14 AM  Assessment Completed by: Patti Barrios RN    If patient is discharged prior to next notation, then this note serves as note for discharge by case management.    Patient Name: Nakul Parkinson                   YOB: 1965  Diagnosis: Osteoarthritis of right knee [M17.11]  Primary osteoarthritis of right knee [M17.11]                   Date / Time: 6/19/2024  6:26 AM    Patient Admission Status: Outpatient in a bed   Readmission Risk (Low < 19, Mod (19-27), High > 27): No data recorded  Current PCP: Hernando Wing MD  PCP verified by CM? Yes (Maciej)    Chart Reviewed: Yes      History Provided by: Patient  Patient Orientation: Alert and Oriented    Patient Cognition: Alert    Hospitalization in the last 30 days (Readmission):  No    If yes, Readmission Assessment in  Navigator will be completed.    Advance Directives:      Code Status: Full Code   Patient's Primary Decision Maker is: Legal Next of Kin    Primary Decision Maker: Yfn Montgomery - Brother/Sister - [phone]    Secondary Decision Maker: Jarret Parkinson - Brother/Sister - [phone]    Discharge Planning:    Patient lives with: Alone Type of Home: House  Primary Care Giver: Self  Patient Support Systems include: Family Members   Current Financial resources: Other (Comment) (Worker's comp)  Current community resources: None  Current services prior to admission: None            Current DME:              Type of Home Care services:  OT, PT, Nursing Services    ADLS  Prior functional level: Independent in ADLs/IADLs  Current functional level: Assistance with the following:, Mobility    PT AM-PAC:   /24  OT AM-PAC: 21 /24    Family can provide assistance at DC: Yes  Would you like Case Management to discuss the discharge plan with any other family members/significant others, and if so, who? No  Plans to Return to Present  Representative Agree with the Discharge Plan?      Patti Barrios RN  Case Management Department  Ph: [phone] Fax: [phone]

## 2024-06-20 NOTE — PROGRESS NOTES
Department of Orthopedic Surgery  Physician Assistant   Progress Note    Subjective:     Post-Operative Day: 1 Status Post right Total Knee Arthroplasty  Systemic or Specific Complaints:No Complaints notes that he is doing well overall he is hopefull to go home today but waiting to hear about getting the PT/OT for home established. Patient sitting in chair at time of visit no family at bedside.     Objective:     Patient Vitals for the past 24 hrs:   BP Temp Temp src Pulse Resp SpO2   06/20/24 0840 -- -- -- -- 16 --   06/20/24 0800 136/74 97.9 °F (36.6 °C) Oral 83 16 96 %   06/20/24 0230 124/66 97.6 °F (36.4 °C) Oral 84 16 96 %   06/20/24 0025 -- -- -- -- 15 --   06/19/24 2355 -- -- -- -- 16 --   06/19/24 2141 -- -- -- -- 16 --   06/19/24 2111 -- -- -- -- 16 --   06/19/24 1942 -- -- -- -- 16 --   06/19/24 1936 120/69 98 °F (36.7 °C) Oral (!) 103 18 91 %   06/19/24 1549 -- -- -- -- -- 94 %   06/19/24 1530 (!) 144/97 97.6 °F (36.4 °C) Oral 96 18 93 %   06/19/24 1400 (!) 160/80 -- -- 95 16 92 %       General: alert, appears stated age, and cooperative   Wound: Wound clean and dry no evidence of infection., No Erythema, No Edema, No Drainage, and Wound Intact   Motion: Painful range of Motion   DVT Exam: No evidence of DVT seen on physical exam.  Negative Iman's sign.  No cords or calf tenderness.  No significant calf/ankle edema.       Knee swollen but thigh soft to palpation. Moving foot and ankle. Good distal pulses.      Data Review  CBC:   Lab Results   Component Value Date/Time    WBC 13.4 06/20/2024 05:14 AM    RBC 4.28 06/20/2024 05:14 AM    HGB 12.8 06/20/2024 05:14 AM    HCT 38.6 06/20/2024 05:14 AM     06/20/2024 05:14 AM       Assessment:     Status Post right Total Knee Arthroplasty. Doing well postoperatively.     Plan:      1: Discharge today, Return to Clinic: once we have pt/ot established for home :  2:  Continue Deep venous thrombosis prophylaxis asa 325mg BID   3:  Continue physical  therapy WBAT with use of walker   4:  Continue Pain Control  5: plan for DC to home today but may need to stay one more night to ensure PT/OT established for home.         Electronically signed by VIOLETTA Marley on 6/20/2024 at 1:25 PM

## 2024-06-20 NOTE — PROGRESS NOTES
AM assessment completed, see flow sheet. Pt is alert and oriented. Vital signs are WNL. Respirations are even & easy on RA. Pt outer dressing removed, mepilex intact to reight knee, moderate swelling, ice placed to site. Neuro/vasc check WNL. Pt denies needs at this time. SR up x 2, and bed in low position. Call light is within reach.

## 2024-06-20 NOTE — PROGRESS NOTES
Pt lives with    Alone  Home environment:    one story home  Steps to enter first floor:   2 steps to enter and hand rail unilateral  Steps to second floor/basement: Full flight of 12-13  Laundry:     Basement  Bathroom:     tub/shower unit, walk in shower, and standard height toilet  Pt sleeps in a:    Flat bed  Equipment owned:    SPC and axillary crutches    Preadmission Status:  Pt able to drive: Yes  Pt fully independent with ADLs: Yes  Pt receives assistance from family for: Independent PTA  Pt independent for functional transfers and utilized No Device for mobility in home and No Device out in community  History of falls: No  Home Health Services:None    Objective  Does this pt have an acute or acute on chronic diagnosis of CHF? No    Balance  Static Sitting:  Good ; SBA  Dynamic Sitting:  Good ; SBA   Comments:     Static Standing: Not tested; Not Tested  Dynamic Standing: Not tested; Not Tested  Comments:     Posture  Seated: WNL  Standing: WNL    Bed Mobility   Supine to Sit:    Not Tested  Sit to Supine:   Not Tested  Rolling:   Not Tested   Scooting in sitting: Supervision   Scooting in supine:  Not Tested   Bridging:  Not Tested    Transfer Training     Sit to stand:   Not Tested   Stand to sit:   Not Tested   Bed to/from Chair:  Not Tested with use of N/A    Gait gait deferred due to focus on knee HEP and mobility; pt ambulated 0 ft.   Distance:      0 ft  Deviations (firm surface/linoleum):  N/A  Assistive Device Used:    N/A  Level of Assist:    Not Tested   Comment:     Stair Training deferred, pt unsafe/ not appropriate to complete stairs at this time  # of Steps:   N/A  Level of Assist:  Not Tested   UE Support:  NA  Assistive Device:  N/A  Pattern:   N/A  Comments:      Therapeutic Exercises Initiated  all completed bilaterally unless indicated  Supine:  N/A    Seated:  Ankle pumps: 10 reps  Marching: 10 reps  LAQ: 10 reps  Heel slides: 10 reps  Long sitting gastroc stretch using gait belt    Used pillow case for heel slides   Quad set: 10 reps    Standing:  N/A    Activity Tolerance   During therapy session noted pt with pain    Pt Position BP (mmHg) HR (bpm) SpO2 (%) on   Comments   Supine at rest       Seated at EOB       Standing       End of session         Positioning Needs   Pt up in chair, no alarm needed, positioned in proper neutral alignment and pressure relief provided.   In direct line of sight of RN staff    Other Activities  Soft tissue massage to R knee - reviewed distal to proximal to reduce swelling     Patient/Family Education   Pt educated on role of inpatient PT, POC, importance of continued activity, DC recommendations, safety awareness, transfer techniques, HEP, and calling for assist with mobility.      Assessment  Pt seen today for physical therapy Treatment. Pt demonstrated decreased Activity tolerance, ROM, Safety, and Strength as well as decreased independence with Ambulation and Transfers.   Focused treatment on post-op TKR HEP. PT demo'd good knee flexion POD1 using pillow case to slide foot on floor in seated position. Provided ice at end of session.   Consider simulating car transfer NPV.     Recommending Home 24 hr assist upon discharge as patient functioning below baseline level    Goals :   To be met in 3 visits:  1). Independent with LE Ex x 10 reps  2). Sit to/from stand: SBA  3). Bed to chair: SBA  4). CHF goal: N/A    To be met in 6 visits:  1).  Supine to/from sit: Supervision  2).  Sit to/from stand: Supervision  3).  Bed to chair: SBA  4).  Gait: Ambulate 50 ft.  with SBA and use of gait belt and rolling walker (RW)  5).  Tolerate B LE exercises 3 sets of 10-15 reps  6).  Ascend/descend 2 steps with SBA with use of hand rail unilateral and No AD    Rehabilitation Potential: Good  Strengths for achieving goals include:   Pt motivated, PLOF, and Pt cooperative   Barriers to achieving goals include:    Pain    Plan    To be seen 5-7 x / week BID (ortho) while in

## 2024-06-20 NOTE — DISCHARGE INSTR - COC
Continuity of Care Form    Patient Name: Nakul Parkinson   :  1965  MRN:  [id number]    Admit date:  2024  Discharge date:  24      Code Status Order: Full Code   Advance Directives:   Advance Care Flowsheet Documentation       Date/Time Healthcare Directive Type of Healthcare Directive Copy in Chart Healthcare Agent Appointed Healthcare Agent's Name Healthcare Agent's Phone Number    24 0724 No, patient does not have an advance directive for healthcare treatment -- -- -- -- --            Admitting Physician:  Hernando Iqbal MD  PCP: Hernando Wing MD    Discharging Nurse: [facility] Unit/Room#: 0204/0204-01  Discharging Unit Phone Number: [phone]    Emergency Contact:   Extended Emergency Contact Information  Primary Emergency Contact: Yfn Montgomery  Home Phone: [phone]  Mobile Phone: [phone]  Relation: Brother/Sister  Preferred language: English   needed? No  Secondary Emergency Contact: IggyJarret COREY  Home Phone: [phone]  Mobile Phone: [phone]  Relation: Brother/Sister  Preferred language: English   needed? No    Past Surgical History:  Past Surgical History:   Procedure Laterality Date    BACK SURGERY N/A 2022    REMOVAL OF LIPOMA AND CYST CERVICAL/THORACIC AREA performed by Antonio Cui MD at [facility] OR    CARPAL TUNNEL RELEASE      COLONOSCOPY  2016    normal    CYST REMOVAL      HAND SURGERY      HERNIA REPAIR      KNEE ARTHROSCOPY      KNEE ARTHROSCOPY Right 2014    KNEE ARTHROSCOPY Left 2018    KNEE VIDEO ARTHROSCOPY, MEDIAL MENISECTOMY, CHONDROPLASTY, INTERNAL FIXATION MEDIAL TIBIAL PLATEAU INSUFFICIENCY FRACTURE WITH BONE SUBSTITUTE     KNEE SURGERY      IN ARTHROSCOPY KNEE OSTEOCHONDRAL ALLOGRAFT Left 2018    KNEE VIDEO ARTHROSCOPY, MEDIAL MENISECTOMY, CHONDROPLASTY, INTERNAL FIXATION MEDIAL TIBIAL PLATEAU INSUFFICIENCY FRACTURE WITH BONE SUBSTITUTE performed  Last 1 Encounters:   06/19/24 115.7 kg (255 lb)     Mental Status:  oriented and alert    IV Access:  - None    Nursing Mobility/ADLs:  Walking   Assisted  Transfer  Assisted  Bathing  Assisted  Dressing  Assisted  Toileting  Assisted  Feeding  Assisted  Med Admin  Assisted  Med Delivery   whole    Wound Care Documentation and Therapy:  Incision 12/01/14 Knee Right (Active)   Number of days: 3489       Incision 11/12/18 Other (Comment) Left (Active)   Number of days: 2046       Incision 11/18/22 Back Medial;Upper (Active)   Number of days: 580       Incision 11/18/22 Neck Dorsal;Left (Active)   Dressing Status Clean;Dry;Intact 06/19/24 1106   Margins Approximated 06/19/24 1106   Drainage Amount None (dry) 06/19/24 1106   Odor None 06/19/24 1106   Number of days: 580       Incision 06/19/24 Knee Right;Anterior (Active)   Dressing Status Clean;Dry;Intact 06/20/24 0800   Dressing/Treatment Ace wrap 06/20/24 0800   Margins Approximated 06/19/24 1130   Incision Assessment Other (Comment) 06/20/24 0800   Drainage Amount None (dry) 06/20/24 0800   Number of days: 1        Elimination:  Continence:   Bowel: Yes  Bladder: Yes  Urinary Catheter: None   Colostomy/Ileostomy/Ileal Conduit: na         Date of Last BM: 6/20/2024    Intake/Output Summary (Last 24 hours) at 6/20/2024 1336  Last data filed at 6/20/2024 0830  Gross per 24 hour   Intake 1064 ml   Output 1800 ml   Net -736 ml     I/O last 3 completed shifts:  In: 1674 [P.O.:624; I.V.:1000; IV Piggyback:50]  Out: 1800 [Urine:1800]    Safety Concerns:     At Risk for Falls    Impairments/Disabilities:      None    Nutrition Therapy:  Current Nutrition Therapy:   - Oral Diet:  General    Routes of Feeding: Oral  Liquids: No Restrictions  Daily Fluid Restriction: no  Last Modified Barium Swallow with Video (Video Swallowing Test): not done    Treatments at the Time of Hospital Discharge:   Respiratory Treatments:   Oxygen Therapy:  is not on home oxygen

## 2024-06-20 NOTE — PROGRESS NOTES
Inpatient Physical Therapy Evaluation & Treatment    Unit: [facility]  Date:  2024  Patient Name:    Nakul Parkinson  Admitting diagnosis:  Osteoarthritis of right knee [M17.11]  Primary osteoarthritis of right knee [M17.11]  Admit Date:  2024  Precautions/Restrictions/WB Status/ Lines/ Wounds/ Oxygen: Fall risk, Bed/chair alarm, Lines (IV), and Knee immobilizer    Treatment Time:  1108 1138  Treatment Number:  2   Timed Code Treatment Minutes: 30 minutes  Total Treatment Minutes:  30  minutes    Patient Stated Goals for Therapy: \" to walk \"          Discharge Recommendations: Home with 24hr supervision and Home PT  DME needs for discharge: RW       Therapy recommendation for EMS Transport: requires transport by cot due to knee immobilizer    Therapy recommendations for staff:   Assist of 1 for ambulation with use of rolling walker (RW) and gait belt to/from chair  to/from bathroom    History of Present Illness:   59-year-old male presented with a history, exam, and testing consistent with severe right knee osteoarthritis. After failure of other modalities of care including arthroscopy and subchondroplasty with injections, viscosupplementation, therapy, and bracing, and with persistent pain that was interfering with the quality of his life, he elected for the recommendation of surgical intervention. His x-ray did confirm severe osteoarthritis tricompartmentally. After discussing treatment options, he elected for the recommendation of total knee replacement as a surgical option.     AM-PAC Mobility Score    AM-PAC Inpatient Mobility Raw Score : 22        Subjective  Patient lying reclined in bed with no family present.  Pt agreeable to this PT session. Pt agreeable to ambulate complete exercises.     Cognition    A&O x4   Able to follow 2 step commands    Pain   Yes  Location: R knee  Ratin /10  Pain Medicine Status: Pain med requested and RN notified    Preadmission Environment:   Pt lives

## 2024-06-20 NOTE — PROGRESS NOTES
Inpatient Occupational Therapy Evaluation and Treatment    Unit: [facility]  Date:  2024  Patient Name:    Nakul Parkinson  Admitting diagnosis:  Osteoarthritis of right knee [M17.11]  Primary osteoarthritis of right knee [M17.11]  Admit Date:  2024  Precautions/Restrictions/WB Status/ Lines/ Wounds/ Oxygen: Fall risk, Lines (IV), and Knee immobilizer        Treatment Time:  9:29-10:02  Treatment Number:  1  Timed Code Treatment Minutes: 23 minutes  Total Treatment Minutes:  33  minutes    Patient Goals for Therapy: \"to get home \"          Discharge Recommendations: Home with PRN assist and Home OT  DME needs for discharge: RW and Shower Chair       Therapy recommendations for staff:   Assist of 1 for transfers with use of rolling walker (RW) within room    History of Present Illness: 59-year-old male presented with a history, exam, and testing consistent with severe right knee osteoarthritis. After failure of other modalities of care including arthroscopy and subchondroplasty with injections, viscosupplementation, therapy, and bracing, and with persistent pain that was interfering with the quality of his life, he elected for the recommendation of surgical intervention. His x-ray did confirm severe osteoarthritis tricompartmentally. After discussing treatment options, he elected for the recommendation of total knee replacement as a surgical option.     AM-PAC Score: AM-PAC Inpatient Daily Activity Raw Score: 21     Subjective:  Patient lying reclined in bed with no family present.   Pt agreeable to this OT session.     Cognition:    A&O x4   Able to follow 2 step commands    Pain:   Yes  Location: R knee  Ratin /10  Pain Medicine Status: Received pain med prior to tx    Preadmission Environment:   Pt lives with                                         Alone  Home environment:                            one story home  Steps to enter first floor:                     2 steps to enter and hand rail  Tolerance:   Pt completed therapy session with pain     BP (mmHg) HR (bpm) SpO2 (%) on RA Comments   Supine at rest    Pt wanting to get up out of bed, no vitals taken   Seated at EOB       Standing       End of session         Positioning Needs:   Pt up in chair, no alarm needed, call light provided and all needs within reach .     Ther Ex / Activities Initiated:   N/A    Patient/Family Education:   Pt educated on role of inpatient OT, plan of care, importance of continued activity, DC recommendations, Functional transfer/mobility safety, Safety awareness, Transfer techniques, and Pacing activity.    CHF Education  N/A    Assessment:  Pt seen for Occupational therapy evaluation in acute care setting.  Pt demonstrated decreased Activity tolerance, ADLs, Balance , Bathing, Dressing, Safety Awareness, Strength, and Transfers. Pt functioning below baseline and will likely benefit from skilled occupational therapy services to maximize safety and independence.     Recommending Home 24 hr supervision and with home OT upon discharge as patient functioning below baseline level    Goal(s) :   To be met in 3 Visits:  Bed to toilet/BSC:       Supervision  Pt will complete 3/3 CHF goals     N/A    To be met in 5 Visits:  Supine to/from Sit in preparation for ADL task:   Supervision  Toileting        Supervision  Grooming       Supervision  Upper Body Dressing:      Supervision  Lower Body Dressing:      Supervision  Pt to demonstrate UE therapeutic exs x 15 reps with minimal cues    Rehabilitation Potential: Good  Strengths for achieving goals include: Pt motivated, PLOF, Family Support, and Pt cooperative   Barriers to achieving goals include:  Pain    Plan:  To be seen 3-5 x/wk while in acute care setting for therapeutic exercises, bed mobility, transfers, family/patient education, ADL/IADL retraining, and energy conservation training.    Electronically signed by Dorie Morales OT on 6/20/2024 at 10:08 AM      If patient

## 2024-06-20 NOTE — DISCHARGE SUMMARY
Department of Orthopedic Surgery  Physician Assistant   Discharge Summary    The Nakul Parkinson is a 59 y.o. male admitted for right knee osteoarthritis.  Nakul Parkinson was admitted to the floor following His recovery in the PACU.     Discharge Diagnosis  right total knee arthroplasty    Current Inpatient Medications    Current Facility-Administered Medications: oxyCODONE (ROXICODONE) immediate release tablet 5 mg, 5 mg, Oral, Q4H PRN  acetaminophen (TYLENOL) tablet 500 mg, 500 mg, Oral, Q6H PRN  escitalopram (LEXAPRO) tablet 10 mg, 10 mg, Oral, Daily  folic acid (FOLVITE) tablet 1 mg, 1 mg, Oral, Daily  glipiZIDE (GLUCOTROL) tablet 2.5 mg, 2.5 mg, Oral, QAM AC  losartan (COZAAR) tablet 25 mg, 25 mg, Oral, Daily  rosuvastatin (CRESTOR) tablet 10 mg, 10 mg, Oral, Nightly  tiZANidine (ZANAFLEX) tablet 4 mg, 4 mg, Oral, TID PRN  0.9 % sodium chloride infusion, , IntraVENous, Continuous  sodium chloride flush 0.9 % injection 5-40 mL, 5-40 mL, IntraVENous, 2 times per day  sodium chloride flush 0.9 % injection 5-40 mL, 5-40 mL, IntraVENous, PRN  0.9 % sodium chloride infusion, , IntraVENous, PRN  acetaminophen (TYLENOL) tablet 650 mg, 650 mg, Oral, Q6H  ketorolac (TORADOL) injection 30 mg, 30 mg, IntraVENous, Q6H  HYDROmorphone (DILAUDID) injection 0.25 mg, 0.25 mg, IntraVENous, Q3H PRN **OR** HYDROmorphone (DILAUDID) injection 0.5 mg, 0.5 mg, IntraVENous, Q3H PRN  hydrOXYzine HCl (ATARAX) tablet 10 mg, 10 mg, Oral, Q8H PRN  sennosides-docusate sodium (SENOKOT-S) 8.6-50 MG tablet 1 tablet, 1 tablet, Oral, BID  bisacodyl (DULCOLAX) EC tablet 5 mg, 5 mg, Oral, Daily PRN  ondansetron (ZOFRAN-ODT) disintegrating tablet 4 mg, 4 mg, Oral, Q8H PRN **OR** ondansetron (ZOFRAN) injection 4 mg, 4 mg, IntraVENous, Q6H PRN  aspirin EC tablet 325 mg, 325 mg, Oral, BID  glucose chewable tablet 16 g, 4 tablet, Oral, PRN  dextrose bolus 10% 125 mL, 125 mL, IntraVENous, PRN **OR** dextrose bolus 10% 250 mL, 250 mL, IntraVENous,  PRN  glucagon injection 1 mg, 1 mg, SubCUTAneous, PRN  dextrose 10 % infusion, , IntraVENous, Continuous PRN    Post-operatively the patient’s diet was advanced as tolerated and their dressing was changed on POD #1.  The incision is dressing in place, clean, dry, and intact with no signs of infection.  The patient remained neurovascularly intact in the right lower and had intact pulses distally.  Patient’s calf remained soft and showed no evidence of DVT.  The patient was able to move their right lower extremity without any problems post-operatively.  Physical therapy and occupational therapy were consulted and began working with the patient post-operatively.  The patient progressed with PT/OT as would be expected and continued to improve through their stay.  The patients pain was initially controlled with IV medications but we were able to transition to oral pain medications soon after arrival to the floor and their pain remained under good control through their hospital stay.  From a medical standpoint the patient remained stable and continued to have the medicine team follow throughout their stay.  The patient will be discharged at this time to Home  with their current diet restrictions and will continue to follow the precautions outlined to them by us and PT/OT.       Condition on Discharge: Stable    Plan  Return visit in 2 weeks..  Patient was instructed on the use of pain medications, the signs and symptoms of infection, and was given our number to call should they have any questions or concerns following discharge.      Electronically signed by VIOLETTA Marley on 6/20/2024 at 1:38 PM

## 2024-06-20 NOTE — PROGRESS NOTES
Report given @ bedside to Meli NUNN, for transferral of care. Pt resting in bed. Call light in reach.

## 2024-06-20 NOTE — FLOWSHEET NOTE
06/19/24 1936   Vital Signs   Temp 98 °F (36.7 °C)   Temp Source Oral   Pulse (!) 103   Heart Rate Source Monitor   Respirations 18   /69   MAP (Calculated) 86   BP Location Left upper arm   BP Method Automatic   Patient Position Semi fowlers   Oxygen Therapy   SpO2 91 %   O2 Device None (Room air)     PM assessment complete. Medications given as ordered. Vital signs stable. Plan of care reviewed including pain regimen and vital signs, hourly checks. Knee immobilizer in place with dressing and ace wrap intact to RLE, neurovasc intact to RLE, cap refill brisk, pt denies numbness and tingling. Scheduled toradol and tylneol given along with PRN oxycodone for pain. Pt encouraged to notify RN of changes in pain or sensation to RLE. Fall risk education explained with verbalizing understanding. No other needs identified at this time.

## 2024-06-21 VITALS
TEMPERATURE: 97.9 F | SYSTOLIC BLOOD PRESSURE: 135 MMHG | HEIGHT: 73 IN | HEART RATE: 87 BPM | OXYGEN SATURATION: 96 % | BODY MASS INDEX: 33.8 KG/M2 | RESPIRATION RATE: 14 BRPM | DIASTOLIC BLOOD PRESSURE: 70 MMHG | WEIGHT: 255 LBS

## 2024-06-21 LAB
BASOPHILS # BLD: 0 K/UL (ref 0–0.2)
BASOPHILS NFR BLD: 0.5 %
DEPRECATED RDW RBC AUTO: 14 % (ref 12.4–15.4)
EOSINOPHIL # BLD: 0.2 K/UL (ref 0–0.6)
EOSINOPHIL NFR BLD: 1.8 %
GLUCOSE BLD-MCNC: 129 MG/DL (ref 70–99)
GLUCOSE BLD-MCNC: 146 MG/DL (ref 70–99)
HCT VFR BLD AUTO: 36.5 % (ref 40.5–52.5)
HGB BLD-MCNC: 12.3 G/DL (ref 13.5–17.5)
LYMPHOCYTES # BLD: 1.6 K/UL (ref 1–5.1)
LYMPHOCYTES NFR BLD: 15.1 %
MCH RBC QN AUTO: 30 PG (ref 26–34)
MCHC RBC AUTO-ENTMCNC: 33.6 G/DL (ref 31–36)
MCV RBC AUTO: 89.2 FL (ref 80–100)
MONOCYTES # BLD: 0.8 K/UL (ref 0–1.3)
MONOCYTES NFR BLD: 8.1 %
NEUTROPHILS # BLD: 7.7 K/UL (ref 1.7–7.7)
NEUTROPHILS NFR BLD: 74.5 %
PERFORMED ON: ABNORMAL
PERFORMED ON: ABNORMAL
PLATELET # BLD AUTO: 158 K/UL (ref 135–450)
PMV BLD AUTO: 8.3 FL (ref 5–10.5)
RBC # BLD AUTO: 4.09 M/UL (ref 4.2–5.9)
WBC # BLD AUTO: 10.4 K/UL (ref 4–11)

## 2024-06-21 PROCEDURE — 99024 POSTOP FOLLOW-UP VISIT: CPT | Performed by: PHYSICIAN ASSISTANT

## 2024-06-21 PROCEDURE — 85025 COMPLETE CBC W/AUTO DIFF WBC: CPT

## 2024-06-21 PROCEDURE — 2580000003 HC RX 258: Performed by: ORTHOPAEDIC SURGERY

## 2024-06-21 PROCEDURE — 97530 THERAPEUTIC ACTIVITIES: CPT

## 2024-06-21 PROCEDURE — 36415 COLL VENOUS BLD VENIPUNCTURE: CPT

## 2024-06-21 PROCEDURE — 6370000000 HC RX 637 (ALT 250 FOR IP): Performed by: ORTHOPAEDIC SURGERY

## 2024-06-21 PROCEDURE — 97110 THERAPEUTIC EXERCISES: CPT

## 2024-06-21 PROCEDURE — 6360000002 HC RX W HCPCS: Performed by: ORTHOPAEDIC SURGERY

## 2024-06-21 RX ADMIN — ESCITALOPRAM OXALATE 10 MG: 10 TABLET ORAL at 08:20

## 2024-06-21 RX ADMIN — LOSARTAN POTASSIUM 25 MG: 25 TABLET, FILM COATED ORAL at 08:20

## 2024-06-21 RX ADMIN — OXYCODONE HYDROCHLORIDE 5 MG: 5 TABLET ORAL at 12:42

## 2024-06-21 RX ADMIN — DOCUSATE SODIUM AND SENNOSIDES 1 TABLET: 8.6; 5 TABLET, FILM COATED ORAL at 08:20

## 2024-06-21 RX ADMIN — ACETAMINOPHEN 650 MG: 325 TABLET ORAL at 05:43

## 2024-06-21 RX ADMIN — ACETAMINOPHEN 650 MG: 325 TABLET ORAL at 10:00

## 2024-06-21 RX ADMIN — KETOROLAC TROMETHAMINE 30 MG: 30 INJECTION, SOLUTION INTRAMUSCULAR at 05:44

## 2024-06-21 RX ADMIN — KETOROLAC TROMETHAMINE 30 MG: 30 INJECTION, SOLUTION INTRAMUSCULAR at 10:00

## 2024-06-21 RX ADMIN — ASPIRIN 325 MG: 325 TABLET, COATED ORAL at 08:20

## 2024-06-21 RX ADMIN — FOLIC ACID 1 MG: 1 TABLET ORAL at 08:20

## 2024-06-21 RX ADMIN — Medication 10 ML: at 08:20

## 2024-06-21 RX ADMIN — GLIPIZIDE 2.5 MG: 5 TABLET ORAL at 05:44

## 2024-06-21 ASSESSMENT — PAIN SCALES - GENERAL
PAINLEVEL_OUTOF10: 5
PAINLEVEL_OUTOF10: 0
PAINLEVEL_OUTOF10: 7
PAINLEVEL_OUTOF10: 6
PAINLEVEL_OUTOF10: 7

## 2024-06-21 ASSESSMENT — PAIN - FUNCTIONAL ASSESSMENT
PAIN_FUNCTIONAL_ASSESSMENT: ACTIVITIES ARE NOT PREVENTED

## 2024-06-21 ASSESSMENT — PAIN DESCRIPTION - ONSET
ONSET: ON-GOING

## 2024-06-21 ASSESSMENT — PAIN DESCRIPTION - LOCATION
LOCATION: KNEE

## 2024-06-21 ASSESSMENT — PAIN DESCRIPTION - DESCRIPTORS
DESCRIPTORS: ACHING
DESCRIPTORS: ACHING;DISCOMFORT
DESCRIPTORS: ACHING
DESCRIPTORS: ACHING;DISCOMFORT

## 2024-06-21 ASSESSMENT — PAIN DESCRIPTION - PAIN TYPE
TYPE: ACUTE PAIN
TYPE: SURGICAL PAIN
TYPE: SURGICAL PAIN

## 2024-06-21 ASSESSMENT — PAIN DESCRIPTION - ORIENTATION
ORIENTATION: RIGHT

## 2024-06-21 ASSESSMENT — PAIN DESCRIPTION - FREQUENCY
FREQUENCY: CONTINUOUS

## 2024-06-21 NOTE — PROGRESS NOTES
Physical Therapy    Orders received and chart reviewed. Pt eating lunch upon arrival. Pt asked if I could return in the afternoon. Will continue to follow. Re-attempt as appropriate and schedule permits.     Edmund Natarajan, PT, DPT

## 2024-06-21 NOTE — DISCHARGE SUMMARY
Department of Orthopedic Surgery  Physician Assistant   Discharge Summary    The Nakul Parkinson is a 59 y.o. male admitted for right knee osteoarthritis.  Nakul Parkinson was admitted to the floor following His recovery in the PACU.     Discharge Diagnosis  right total knee arthroplasty    Current Inpatient Medications    Current Facility-Administered Medications: oxyCODONE (ROXICODONE) immediate release tablet 5 mg, 5 mg, Oral, Q4H PRN  acetaminophen (TYLENOL) tablet 500 mg, 500 mg, Oral, Q6H PRN  escitalopram (LEXAPRO) tablet 10 mg, 10 mg, Oral, Daily  folic acid (FOLVITE) tablet 1 mg, 1 mg, Oral, Daily  glipiZIDE (GLUCOTROL) tablet 2.5 mg, 2.5 mg, Oral, QAM AC  losartan (COZAAR) tablet 25 mg, 25 mg, Oral, Daily  rosuvastatin (CRESTOR) tablet 10 mg, 10 mg, Oral, Nightly  tiZANidine (ZANAFLEX) tablet 4 mg, 4 mg, Oral, TID PRN  0.9 % sodium chloride infusion, , IntraVENous, Continuous  sodium chloride flush 0.9 % injection 5-40 mL, 5-40 mL, IntraVENous, 2 times per day  sodium chloride flush 0.9 % injection 5-40 mL, 5-40 mL, IntraVENous, PRN  0.9 % sodium chloride infusion, , IntraVENous, PRN  acetaminophen (TYLENOL) tablet 650 mg, 650 mg, Oral, Q6H  ketorolac (TORADOL) injection 30 mg, 30 mg, IntraVENous, Q6H  HYDROmorphone (DILAUDID) injection 0.25 mg, 0.25 mg, IntraVENous, Q3H PRN **OR** HYDROmorphone (DILAUDID) injection 0.5 mg, 0.5 mg, IntraVENous, Q3H PRN  hydrOXYzine HCl (ATARAX) tablet 10 mg, 10 mg, Oral, Q8H PRN  sennosides-docusate sodium (SENOKOT-S) 8.6-50 MG tablet 1 tablet, 1 tablet, Oral, BID  bisacodyl (DULCOLAX) EC tablet 5 mg, 5 mg, Oral, Daily PRN  ondansetron (ZOFRAN-ODT) disintegrating tablet 4 mg, 4 mg, Oral, Q8H PRN **OR** ondansetron (ZOFRAN) injection 4 mg, 4 mg, IntraVENous, Q6H PRN  aspirin EC tablet 325 mg, 325 mg, Oral, BID  glucose chewable tablet 16 g, 4 tablet, Oral, PRN  dextrose bolus 10% 125 mL, 125 mL, IntraVENous, PRN **OR** dextrose bolus 10% 250 mL, 250 mL, IntraVENous,  PRN  glucagon injection 1 mg, 1 mg, SubCUTAneous, PRN  dextrose 10 % infusion, , IntraVENous, Continuous PRN    Post-operatively the patient’s diet was advanced as tolerated and their dressing was changed on POD #1.  The incision is dressing in place, clean, dry, and intact with no signs of infection.  The patient remained neurovascularly intact in the right lower and had intact pulses distally.  Patient’s calf remained soft and showed no evidence of DVT.  The patient was able to move their right lower extremity without any problems post-operatively.  Physical therapy and occupational therapy were consulted and began working with the patient post-operatively.  The patient progressed with PT/OT as would be expected and continued to improve through their stay.  The patients pain was initially controlled with IV medications but we were able to transition to oral pain medications soon after arrival to the floor and their pain remained under good control through their hospital stay.  From a medical standpoint the patient remained stable and continued to have the medicine team follow throughout their stay.  The patient will be discharged at this time to Home  with their current diet restrictions and will continue to follow the precautions outlined to them by us and PT/OT.       Condition on Discharge: Stable    Plan  Return visit in 2 weeks..  Patient was instructed on the use of pain medications, the signs and symptoms of infection, and was given our number to call should they have any questions or concerns following discharge.      Electronically signed by VIOLETTA Marley on 6/21/2024 at 11:55 AM

## 2024-06-21 NOTE — PROGRESS NOTES
D/c instructions given to pt with prescriptions sent to home pharmacy. Explanation of new medications given. Verbalized understanding. Transport called for wheelchair.

## 2024-06-21 NOTE — PROGRESS NOTES
Pt resting. Resp e/e. Shift assessment completed and charted. No needs. Will monitor. Meli Vivas RN

## 2024-06-21 NOTE — CARE COORDINATION
DISCHARGE ORDER  Date/Time 2024 4:17 PM  Completed by: Patti Barrios RN, Case Management    Patient Name: Nakul Parkinson      : 1965  Admitting Diagnosis: Osteoarthritis of right knee [M17.11]  Primary osteoarthritis of right knee [M17.11]      Admit order Date and Status:24  (verify MD's last order for status of admission)      Noted discharge order.   If applicable PT/OT recommendation at Discharge: Home with home therapy and 24 h assist  DME recommendation by PT/OT:RW  Confirmed discharge plan : Yes  with whom patient  If pt confirmed DC plan does family need to be contacted by CM No  Discharge Plan:  Order for dc noted. Left VM at workers comp re: dc and C9 approval for [facility]. As of 16:30 no call back from worker comp.    Discussed with Shen Palomares and he states will send pt home today and therapy can give exercises to be done until C can be set up. Shen will inform pt. Per Shen can cont to work on CP Monday and set up HHC. Spoke with [facility] who does not take workers comp. Spoke with [facility] who cannot accept this workers comp and has reached out to other [facility] to see if anyone can do this. Also called [facility] who cannot accept workers comp. Pt updated and given RW. No other dc needs identified.    Date of Last IMM Given: N/A    Reviewed chart.  Role of discharge planner explained and patient verbalized understanding. Discharge order is noted.    Has Home O2 in place on admit:  No  Informed of need to bring portable home O2 tank on day of discharge for nursing to connect prior to leaving:   Not Indicated  Verbalized agreement/Understanding:   Not Indicated  Pt is being d/c'd to Skilled Nursing Facility (SNF)  today. Pt's O2 sats are 95% on RA.    Discharge timeout done with nsg, CM and pt. All discharge needs and concerns addressed.

## 2024-06-21 NOTE — PLAN OF CARE
Problem: Chronic Conditions and Co-morbidities  Goal: Patient's chronic conditions and co-morbidity symptoms are monitored and maintained or improved  Outcome: Progressing  Flowsheets (Taken 6/20/2024 1925)  Care Plan - Patient's Chronic Conditions and Co-Morbidity Symptoms are Monitored and Maintained or Improved: Monitor and assess patient's chronic conditions and comorbid symptoms for stability, deterioration, or improvement     Problem: Discharge Planning  Goal: Discharge to home or other facility with appropriate resources  Outcome: Progressing  Flowsheets (Taken 6/20/2024 1925)  Discharge to home or other facility with appropriate resources: Identify barriers to discharge with patient and caregiver     Problem: Safety - Adult  Goal: Free from fall injury  Outcome: Progressing     Problem: Pain  Goal: Verbalizes/displays adequate comfort level or baseline comfort level  Outcome: Progressing  Flowsheets (Taken 6/20/2024 1925)  Verbalizes/displays adequate comfort level or baseline comfort level: Encourage patient to monitor pain and request assistance     Problem: ABCDS Injury Assessment  Goal: Absence of physical injury  Outcome: Progressing     Problem: Neurosensory - Adult  Goal: Achieves stable or improved neurological status  Outcome: Progressing     Problem: Skin/Tissue Integrity - Adult  Goal: Skin integrity remains intact  Outcome: Progressing     Problem: Musculoskeletal - Adult  Goal: Return mobility to safest level of function  Outcome: Progressing

## 2024-06-21 NOTE — PROGRESS NOTES
Sitting up in chair awake. Rates right knee pain as 5/10. No needs at present time. Call light in reach.

## 2024-06-21 NOTE — PROGRESS NOTES
Inpatient Physical Therapy Treatment    Unit: 2 [facility]  Date:  2024  Patient Name:    Nakul Parkinson  Admitting diagnosis:  Osteoarthritis of right knee [M17.11]  Primary osteoarthritis of right knee [M17.11]  Admit Date:  2024  Precautions/Restrictions/WB Status/ Lines/ Wounds/ Oxygen: Fall risk, Bed/chair alarm, Lines (IV), and Knee immobilizer    Treatment Time:  1410  144  Treatment Number:  4   Timed Code Treatment Minutes: 38 minutes  Total Treatment Minutes:  38 minutes    Patient Stated Goals for Therapy: \" to walk \"          Discharge Recommendations: Home with 24hr supervision and Home PT  DME needs for discharge: RW       Therapy recommendation for EMS Transport: requires transport by cot due to knee immobilizer    Therapy recommendations for staff:   Assist of 1 for ambulation with use of rolling walker (RW) and gait belt to/from chair  to/from bathroom    History of Present Illness:   59-year-old male presented with a history, exam, and testing consistent with severe right knee osteoarthritis. After failure of other modalities of care including arthroscopy and subchondroplasty with injections, viscosupplementation, therapy, and bracing, and with persistent pain that was interfering with the quality of his life, he elected for the recommendation of surgical intervention. His x-ray did confirm severe osteoarthritis tricompartmentally. After discussing treatment options, he elected for the recommendation of total knee replacement as a surgical option.     AM-PAC Mobility Score    AM-PAC Inpatient Mobility Raw Score : 22  AM-PAC Inpatient Mobility without Stair Climbing Raw Score : 17      Subjective  Patient sitting up in chair with no family present.  Pt agreeable to this PT session.   Pt requesting to complete exercises.     Cognition    A&O x4   Able to follow 2 step commands    Pain   Yes  Location: R knee  Ratin /10  Pain Medicine Status: Received pain med prior to tx, just  given    Preadmission Environment:   Pt lives with    Alone  Home environment:    one story home  Steps to enter first floor:   2 steps to enter and hand rail unilateral  Steps to second floor/basement: Full flight of 12-13  Laundry:     Basement  Bathroom:     tub/shower unit, walk in shower, and standard height toilet  Pt sleeps in a:    Flat bed  Equipment owned:    SPC and axillary crutches    Preadmission Status:  Pt able to drive: Yes  Pt fully independent with ADLs: Yes  Pt receives assistance from family for: Independent PTA  Pt independent for functional transfers and utilized No Device for mobility in home and No Device out in community  History of falls: No  Home Health Services:None    Objective  Does this pt have an acute or acute on chronic diagnosis of CHF? No    Balance  Static Sitting:  Good ; SBA  Dynamic Sitting:  Good ; SBA   Comments:     Static Standing: Not tested; Not Tested  Dynamic Standing: Not tested; Not Tested  Comments:     Posture  Seated: WNL  Standing: WNL    Bed Mobility   Supine to Sit:    Not Tested  Sit to Supine:   Not Tested  Rolling:   Not Tested   Scooting in sitting: Supervision   Scooting in supine:  Not Tested   Bridging:  Not Tested    Transfer Training     Sit to stand:   Not Tested   Stand to sit:   Not Tested   Bed to/from Chair:  Not Tested with use of N/A    Gait pt declined to ambulate; pt ambulated 0 ft.   Distance:      0 ft  Deviations (firm surface/linoleum):  N/A  Assistive Device Used:    N/A  Level of Assist:    Not Tested   Comment:     Stair Training deferred, pt unsafe/ not appropriate to complete stairs at this time  # of Steps:   N/A  Level of Assist:  Not Tested   UE Support:  NA  Assistive Device:  N/A  Pattern:   N/A  Comments:      Therapeutic Exercises Initiated  all completed bilaterally unless indicated  Supine:  N/A    Seated:  Ankle pumps: 20 reps  Marchin reps  LAQ: 20 reps  Heel slides: 20 reps  Long sitting gastroc stretch using gait

## 2024-06-21 NOTE — PROGRESS NOTES
Department of Orthopedic Surgery  Physician Assistant   Progress Note    Subjective:     Post-Operative Day: 2 Status Post right Total Knee Arthroplasty  Systemic or Specific Complaints:No Complaints notes that he is doing well overall he is hopefull to go home today noting that he is still waiting to hear about getting the PT/OT for home established. Patient sitting in chair at time of visit no family at bedside. Pt notes that he does not want to stay here waiting for approval for home care.     Objective:     Patient Vitals for the past 24 hrs:   BP Temp Temp src Pulse Resp SpO2   06/21/24 0720 134/77 97.9 °F (36.6 °C) Oral 64 14 95 %   06/21/24 0458 106/70 98.2 °F (36.8 °C) Oral 77 16 96 %   06/21/24 0014 107/67 97.6 °F (36.4 °C) Oral 70 16 96 %   06/20/24 2000 -- -- -- -- 16 --   06/20/24 1930 -- -- -- -- 16 --   06/20/24 1925 (!) 145/81 97.3 °F (36.3 °C) Oral 90 16 95 %   06/20/24 1415 (!) 146/76 97.4 °F (36.3 °C) -- 80 16 94 %         General: alert, appears stated age, and cooperative   Wound: Wound clean and dry no evidence of infection., No Erythema, No Edema, No Drainage, and Wound Intact   Motion: Painful range of Motion   DVT Exam: No evidence of DVT seen on physical exam.  Negative Iman's sign.  No cords or calf tenderness.  No significant calf/ankle edema.       Knee swollen but thigh soft to palpation. Moving foot and ankle. Good distal pulses.      Data Review  CBC:   Lab Results   Component Value Date/Time    WBC 10.4 06/21/2024 05:13 AM    RBC 4.09 06/21/2024 05:13 AM    HGB 12.3 06/21/2024 05:13 AM    HCT 36.5 06/21/2024 05:13 AM     06/21/2024 05:13 AM       Assessment:     Status Post right Total Knee Arthroplasty. Doing well postoperatively.     Plan:      1: Discharge today, Return to Clinic: will establish home care once we have the approval :  2:  Continue Deep venous thrombosis prophylaxis asa 325mg BID   3:  Continue physical therapy WBAT with use of walker   4:  Continue Pain

## 2024-06-21 NOTE — PROGRESS NOTES
Resting in chair awake. No complaints or needs at present time. AM assessment complete. Alert and oriented. Call light in reach.    Bedside Mobility Assessment Tool (BMAT):     Assessment Level 1- Sit and Shake    1. From a semi-reclined position, ask patient to sit up and rotate to a seated position at the side of the bed. Can use the bedrail.    2. Ask patient to reach out and grab your hand and shake making sure patient reaches across his/her midline.   Pass- Patient is able to come to a seated position, maintain core strength. Maintains seated balance while reaching across midline. Move on to Assessment Level 2.     Assessment Level 2- Stretch and Point   1. With patient in seated position at the side of the bed, have patient place both feet on the floor (or stool) with knees no higher than hips.    2. Ask patient to stretch one leg and straighten the knee, then bend the ankle/flex and point the toes. If appropriate, repeat with the other leg.   Pass- Patient is able to demonstrate appropriate quad strength on intended weight bearing limb(s). Move onto Assessment Level 3.     Assessment Level 3- Stand   1. Ask patient to elevate off the bed or chair (seated to standing) using an assistive device (cane, bedrail).    2. Patient should be able to raise buttocks off be and hold for a count of five. May repeat once.   Pass- Patient maintains standing stability for at least 5 seconds, proceed to assessment level 4.    Assessment Level 4- Walk   1. Ask patient to march in place at bedside.    2. Then ask patient to advance step and return each foot. Some medical conditions may render a patient from stepping backwards, use your best clinical judgement.   Pass- Patient demonstrates balance while shifting weight and ability to step, takes independent steps, does not use assistive device patient is MOBILITY LEVEL 4.      Mobility Level- 4

## 2024-06-21 NOTE — PROGRESS NOTES
C/o right knee pain rate as 7/10. Requesting pain medication. Oxycodone 1 tablet po given. See mar.

## 2024-06-24 ENCOUNTER — TELEPHONE (OUTPATIENT)
Dept: ORTHOPEDIC SURGERY | Age: 59
End: 2024-06-24

## 2024-06-24 NOTE — TELEPHONE ENCOUNTER
[facility] CALL REQUEST:    NANCY WITH [facility] WOULD LIKE A CALL ASAP REGARDING APPROVED  POST OP HOME HEALTH CARE. SHE CANNOT FIND ANY FACILITIES THAT  WILL ACCEPT DEPARTMENT OF LABOR AND WANTS TO KNOW WHAT DR. FLORES RECOMMENDS ?    NANCY   CONTACT: [phone]

## 2024-06-24 NOTE — TELEPHONE ENCOUNTER
Called patient for PO evaluation after Rt TKA on 06/19/24 with Dr. Iqbal.  He was discharged on 06/21/24 from the hospital.  He is scheduled for his first PO appointment on 07/12/24. Left voicemail for patient and instructed to call for outpatient PT, or to clarify if he is scheduled for HHC.      Muriel Simmons  Ortho Nurse Navigator  [phone]

## 2024-06-24 NOTE — DISCHARGE INSTR - OTHER ORDERS
[facility] is participating in Medicare's Comprehensive Care for Joint Replacement (CJR) model    [facility] is participating in a Medicare initiative called the Comprehensive Care for Joint Replacement (CJR) model. Medicare designed this model to encourage higher quality care and greater financial accountability from hospitals when Medicare beneficiaries receive lower-extremity joint replacement procedures (LEJR), typically hip or knee replacements. [facility]’s participation in the CJR model should not restrict your access to care for your medical condition or your freedom to choose your health care providers and services. All existing Medicare beneficiary protections continue to be available to you.     The CJR model aims to help give you better care.     The CJR model aims to support better and more efficient care for beneficiaries undergoing LEJR procedures. A CJR episode of care is typically defined as an admission of an eligible Medicare beneficiary to a hospital participating in the CJR model for an LEJR procedure. The LEJR procedure can take place in either an inpatient or outpatient setting and will still be considered a CJR episode of care. The CJR episode of care continues for 90 days following discharge.     This model uses episode payment and quality measurement for an episode of care associated with LEJR procedures to encourage hospitals, physicians, and post-acute care providers to work together to improve the quality and coordination of care from the initial hospitalization through recovery. Under the CJR model, [facility] can earn additional payments from Medicare if we meet the high quality goals set by Medicare, while keeping hospital costs and care spending under control. If we don’t meet these quality and cost goals, we may have to repay Medicare.     Medicare is using the CJR model to encourage [facility]      For an explanation of how patients can access their health care records and beneficiary claims data, please visit https://www.healthit.gov/patients- families/blue-button/about-blue-button    Get more information     If you have questions or want more information about the Comprehensive Care for Joint Replacement (CJR) model, call [facility] at [phone] or call 1-800-MEDICARE. You can also find additional information at https://innovation.cms.gov/initiatives/cjr.

## 2024-06-24 NOTE — CARE COORDINATION
Spoke with CM at [facility] dept of labor  Who states pt is approved for HHC.    Spoke with [facility] re: copy of C-9 and they will fax HHC approval from [employer] comp.    Received C-9 approval for HHC.    [facility] with [facility], [facility], [facility], [facility], [facility], [facility] and [facility] and non in network with worker's comp.     Spoke with Opal at [facility] worker's comp area who states C-9 for HHC has been approved. Requested Opal to fax to this CM.     Received C-9 from Opal at [facility].  Opal will sent message to Dr. Iqbal re: unable to obtain HHC. Await return call    Spoke with [facility] who states does not dc worker's comp.    Spoke with pt via phone to update.     *This note was entered on 6/24*

## 2024-06-25 NOTE — TELEPHONE ENCOUNTER
R/C TO NANCY WITH [employer] [facility] THAT SHE COULD TRY [facility] AS THEY HAVE A HOME HEALTH DEPARTMENT THAT MAY ACCEPT DEPARTMENT OF LABOR C9.

## 2024-06-26 DIAGNOSIS — M17.11 PRIMARY OSTEOARTHRITIS OF RIGHT KNEE: Primary | ICD-10-CM

## 2024-06-26 NOTE — CARE COORDINATION
Did not receive call back from Dr Iqbal re: no HHC set up. [place] PA here. Made Shen aware that pt still does not home HHC as cannot find agency contracted with his worker's comp. He will message Dr. Iqbal.    Received call from Franco Iqbal assistant. Discussed with Franco that I was unable to secure HHC for pt as no agency that goes to where he lives in contracted with his worker's comp agency. Explained I have tried multiple agencies.  Franco states he will call [facility] to see if they will provide HHC.    Received VM from Dr. Iqbals office stating to try [facility].    Spoke with Olena at [facility] with Dr. Iqbal's request to see if they can do HHC. 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 Olena to check if can accept and will return call.     Received call back from Olena at [facility] who states they are unable to accept as they do not have contract with this worker's comp agency.    Spoke with Franco from Dr. Iqbal's office re: [facility] unable to Accept pt and he states he has talked with pt and Dr Iqbal and pt will cont to do exercise at home until seen by Dr. Iqbal on 7/12/24.    Spoke with pt again to confirm that plan is now to cont exercise at home until seen by Dr. Iqbal. Pt confirms he talked with Franco and this is the plan.    Of note Dr. Iqbal is [place] [facility] and Not [facility]    *This note was entered 6/26*

## 2024-07-01 DIAGNOSIS — M17.11 PRIMARY OSTEOARTHRITIS OF RIGHT KNEE: Primary | ICD-10-CM

## 2024-07-01 RX ORDER — TIZANIDINE 4 MG/1
4 TABLET ORAL 3 TIMES DAILY PRN
Qty: 30 TABLET | Refills: 0 | Status: SHIPPED | OUTPATIENT
Start: 2024-07-01

## 2024-07-01 RX ORDER — TRAMADOL HYDROCHLORIDE 50 MG/1
50 TABLET ORAL EVERY 6 HOURS PRN
Qty: 28 TABLET | Refills: 0 | Status: SHIPPED | OUTPATIENT
Start: 2024-07-01 | End: 2024-07-08

## 2024-07-12 ENCOUNTER — OFFICE VISIT (OUTPATIENT)
Dept: ORTHOPEDIC SURGERY | Age: 59
End: 2024-07-12

## 2024-07-12 DIAGNOSIS — Z96.651 S/P TOTAL KNEE REPLACEMENT, RIGHT: Primary | ICD-10-CM

## 2024-07-12 DIAGNOSIS — M17.11 PRIMARY OSTEOARTHRITIS OF RIGHT KNEE: ICD-10-CM

## 2024-07-12 RX ORDER — TIZANIDINE 4 MG/1
4 TABLET ORAL 3 TIMES DAILY PRN
Qty: 30 TABLET | Refills: 3 | Status: SHIPPED | OUTPATIENT
Start: 2024-07-12

## 2024-07-12 RX ORDER — HYDROCODONE BITARTRATE AND ACETAMINOPHEN 5; 325 MG/1; MG/1
1 TABLET ORAL EVERY 4 HOURS PRN
Qty: 42 TABLET | Refills: 0 | Status: SHIPPED | OUTPATIENT
Start: 2024-07-12 | End: 2024-07-19

## 2024-07-12 RX ORDER — NABUMETONE 500 MG/1
500 TABLET, FILM COATED ORAL 2 TIMES DAILY
Qty: 60 TABLET | Refills: 3 | Status: SHIPPED | OUTPATIENT
Start: 2024-07-12

## 2024-07-12 NOTE — PROGRESS NOTES
FOLLOW-UP VISIT      The patient returns for follow-up s/p right total knee replacement    Date of Surgery    6/19/2024    Wound Status    ncisions are healing well with no surrounding erythema, and minimal ecchymosis.     Exam    He has range of motion -5 to about 100 degrees.  The swelling is resolved and his ecchymosis is gone.  He has minimal swelling no signs of infection or DVT.  He is walking without ambulatory aids with moderate limp.  X-rays 2 views of the right knee reveals well-positioned kinematically aligned total knee replacement with no signs of failure.  I reviewed those x-rays with him.    Plan    Aggressive physical therapy to be started as an outpatient, refill of pain medication, antispasmodic, and anti-inflammatory.    Follow-up Appointment    4 weeks

## 2024-07-15 ENCOUNTER — HOSPITAL ENCOUNTER (OUTPATIENT)
Dept: PHYSICAL THERAPY | Age: 59
Setting detail: THERAPIES SERIES
Discharge: HOME | End: 2024-07-15
Payer: OTHER GOVERNMENT

## 2024-07-15 ENCOUNTER — TELEPHONE (OUTPATIENT)
Dept: ORTHOPEDIC SURGERY | Age: 59
End: 2024-07-15

## 2024-07-15 DIAGNOSIS — M62.81 MUSCLE WEAKNESS OF LOWER EXTREMITY: ICD-10-CM

## 2024-07-15 DIAGNOSIS — M25.561 RIGHT KNEE PAIN, UNSPECIFIED CHRONICITY: Primary | ICD-10-CM

## 2024-07-15 PROCEDURE — 97110 THERAPEUTIC EXERCISES: CPT

## 2024-07-15 PROCEDURE — 97140 MANUAL THERAPY 1/> REGIONS: CPT

## 2024-07-15 PROCEDURE — 97161 PT EVAL LOW COMPLEX 20 MIN: CPT

## 2024-07-15 PROCEDURE — 97112 NEUROMUSCULAR REEDUCATION: CPT

## 2024-07-15 NOTE — PLAN OF CARE
NEUROMUSCULAR RE-EDUCATION - Therapeutic procedure, 1 or more areas, each 15 minutes; neuromuscular reeducation of movement, balance, coordination, kinesthetic sense, posture, and/or proprioception for sitting and/or standing activities  (20884) MANUAL THERAPY -  Manual therapy techniques, 1 or more regions, each 15 minutes (Mobilization/manipulation, manual lymphatic drainage, manual traction) for the purpose of modulating pain, promoting relaxation,  increasing ROM, reducing/eliminating soft tissue swelling/inflammation/restriction, improving soft tissue extensibility and allowing for proper ROM for normal function with self care, mobility, lifting and ambulation    GOALS     Patient stated goal: \"To rehab from this surgery\"  [] Progressing: [] Met: [] Not Met: [] Adjusted    Therapist goals for Patient:   Short Term Goals: To be achieved in: 2 weeks  1. Independent in HEP and progression per patient tolerance, in order to prevent re-injury.   [] Progressing: [] Met: [] Not Met: [] Adjusted  2. Patient will have a decrease in pain to <1-2/10 to facilitate improvement in movement, function, and ADLs as indicated by Functional Deficits.  [] Progressing: [] Met: [] Not Met: [] Adjusted    Long Term Goals: To be achieved in: 12 weeks  1. Disability index score of 20% or less for the LEFS to assist with reaching prior level of function with activities such as getting into and out of car without difficulty.  [] Progressing: [] Met: [] Not Met: [] Adjusted  2. Patient will demonstrate increased AROM of R Knee from 0 °  to 120 °  without pain to allow for proper joint functioning to enable patient to be able to ascend and descend steps reciprocally.   [] Progressing: [] Met: [] Not Met: [] Adjusted  3. Patient will demonstrate increased Strength of R Hip/Knee to at least 4+/5 throughout without pain to allow for proper functional mobility to enable patient to return to improve TKE with ambulation.   [] Progressing: []

## 2024-07-17 ENCOUNTER — TELEPHONE (OUTPATIENT)
Dept: FAMILY MEDICINE CLINIC | Age: 59
End: 2024-07-17

## 2024-07-22 ENCOUNTER — HOSPITAL ENCOUNTER (OUTPATIENT)
Dept: PHYSICAL THERAPY | Age: 59
Setting detail: THERAPIES SERIES
Discharge: HOME | End: 2024-07-22
Payer: OTHER GOVERNMENT

## 2024-07-22 DIAGNOSIS — Z96.651 S/P TOTAL KNEE REPLACEMENT, RIGHT: Primary | ICD-10-CM

## 2024-07-22 DIAGNOSIS — M17.11 PRIMARY OSTEOARTHRITIS OF RIGHT KNEE: ICD-10-CM

## 2024-07-22 PROCEDURE — 97112 NEUROMUSCULAR REEDUCATION: CPT

## 2024-07-22 PROCEDURE — 97140 MANUAL THERAPY 1/> REGIONS: CPT

## 2024-07-22 PROCEDURE — 97110 THERAPEUTIC EXERCISES: CPT

## 2024-07-22 NOTE — FLOWSHEET NOTE
[facility]- Outpatient Rehabilitation and Therapy [address] [place] [address] office: [phone] fax: [phone]      Physical Therapy: TREATMENT/PROGRESS NOTE   Patient: Nakul Parkinson (59 y.o. male)   Examination Date: 2024   :  1965 MRN: [id number]   Visit #: 2   Insurance Allowable Auth Needed   18 visits (-24) []Yes    []No    Insurance: Payor: [employer] OF [employer]OR OWCP / Plan: [employer] OF [employer] OWCP / Product Type: *No Product type* /   Insurance ID: [id number] - (Worker's Comp)  Secondary Insurance (if applicable):    Treatment Diagnosis:     ICD-10-CM    1. Right knee pain, unspecified chronicity  M25.561       2. Muscle weakness of lower extremity  M62.81          Medical Diagnosis:  Primary osteoarthritis of right knee [M17.11] s/p R TKA 2024   Referring Physician: Hernando Iqbal MD  PCP: Hernando Wing MD     Plan of care signed (Y/N):     Date of Patient follow up with Physician:      Progress Report/POC: NO  POC update due: (10 visits /OR AUTH LIMITS, whichever is less)  2024                                             Precautions/ Contra-indications:           Latex allergy:  NO  Pacemaker:    NO  Contraindications for Manipulation: None  Date of Surgery: 2024  Other:    Red Flags:  None    C-SSRS Triggered by Intake questionnaire:   Patient answered 'NO' to both behavioral questions on intake.  No further screening warranted    Preferred Language for Healthcare:   [x] English       [] other:    SUBJECTIVE EXAMINATION     Patient stated complaint: Pt states he felt ok after LV, pt states he has not been using AD for functional mobility.      Test used Initial score  7/15/24 2024   Pain Summary VAS 10 5/10   Functional questionnaire LEFS   83%    Other:   AAROM   PROM 10-85           OBJECTIVE EXAMINATION     7/15/24  ROM/Strength: (Blank cells denote NT)      Mvmt (norm) AROM L AROM R Notes

## 2024-07-25 ENCOUNTER — HOSPITAL ENCOUNTER (OUTPATIENT)
Dept: PHYSICAL THERAPY | Age: 59
Setting detail: THERAPIES SERIES
Discharge: HOME | End: 2024-07-25
Payer: OTHER GOVERNMENT

## 2024-07-25 PROCEDURE — 97110 THERAPEUTIC EXERCISES: CPT

## 2024-07-25 PROCEDURE — 97112 NEUROMUSCULAR REEDUCATION: CPT

## 2024-07-25 PROCEDURE — 97140 MANUAL THERAPY 1/> REGIONS: CPT

## 2024-07-25 NOTE — FLOWSHEET NOTE
[] Progressing: [] Met: [] Not Met: [] Adjusted  5. To be able to get up and down from lower surfaces with only 1 UE for help. (patient specific functional goal)    [] Progressing: [] Met: [] Not Met: [] Adjusted     Overall Progression Towards Functional goals/ Treatment Progress Update:  [] Patient is progressing as expected towards functional goals listed.    [x] Progression is slowed due to complexities/Impairments listed.  [] Progression has been slowed due to co-morbidities.  [] Plan just implemented, too soon (<30days) to assess goals progression   [] Goals require adjustment due to lack of progress  [] Patient is not progressing as expected and requires additional follow up with physician  [] Other:     TREATMENT PLAN     Frequency/Duration:  1-3 /week for  12  weeks for the following treatment interventions:    Interventions:  Therapeutic Exercise (85216) including: strength training, ROM, and functional mobility  Therapeutic Activities (74662) including: functional mobility training and education.  Neuromuscular Re-education (01078) activation and proprioception, including postural re-education.    Gait Training (63586) for normalization of ambulation patterns and AD training.   Manual Therapy (06956) as indicated to include: Passive Range of Motion, Gr I-IV mobilizations, Soft Tissue Mobilization, Trigger Point Release, and Myofascial Release  Modalities as needed that may include: Cryotherapy, Electrical Stimulation, Thermal Agents, and Vasoneumatic Compression  Patient education on joint protection, postural re-education, activity modification, and progression of HEP    Plan: Cont POC- Continue emphasis/focus on exercise progression, improving proper muscle recruitment and activation/motor control patterns, modulating pain, increasing ROM, reduce/eliminate soft tissue swelling/inflammation/restriction, allowing for proper ROM, and static and dynamic balance. Next visit plan to continue current phase

## 2024-07-29 ENCOUNTER — APPOINTMENT (OUTPATIENT)
Dept: PHYSICAL THERAPY | Age: 59
End: 2024-07-29
Payer: OTHER GOVERNMENT

## 2024-07-29 ENCOUNTER — HOSPITAL ENCOUNTER (OUTPATIENT)
Dept: PHYSICAL THERAPY | Age: 59
Setting detail: THERAPIES SERIES
Discharge: HOME | End: 2024-07-29
Payer: OTHER GOVERNMENT

## 2024-07-29 PROCEDURE — 97140 MANUAL THERAPY 1/> REGIONS: CPT

## 2024-07-29 PROCEDURE — 97110 THERAPEUTIC EXERCISES: CPT

## 2024-07-29 PROCEDURE — 97112 NEUROMUSCULAR REEDUCATION: CPT

## 2024-07-29 NOTE — FLOWSHEET NOTE
[facility]- Outpatient Rehabilitation and Therapy [address]. [place] [address] office: [phone] fax: [phone]      Physical Therapy: TREATMENT/PROGRESS NOTE   Patient: Nakul Parkinson (59 y.o. male)   Examination Date: 2024   :  1965 MRN: [id number]   Visit #: 4   Insurance Allowable Auth Needed   18 visits (-24) []Yes    []No    Insurance: Payor: [employer] DEPARTMENT OF LABOR OWCP / Plan: [employer] OF [employer] OWCP / Product Type: *No Product type* /   Insurance ID: [id number] - (Worker's Comp)  Secondary Insurance (if applicable):    Treatment Diagnosis:     ICD-10-CM    1. Right knee pain, unspecified chronicity  M25.561       2. Muscle weakness of lower extremity  M62.81          Medical Diagnosis:  Primary osteoarthritis of right knee [M17.11] s/p R TKA 2024   Referring Physician: Hernando Iqbal MD  PCP: Hernando Wing MD     Plan of care signed (Y/N):     Date of Patient follow up with Physician:      Progress Report/POC: NO  POC update due: (10 visits /OR AUTH LIMITS, whichever is less)  2024                                             Precautions/ Contra-indications:           Latex allergy:  NO  Pacemaker:    NO  Contraindications for Manipulation: None  Date of Surgery: 2024  Other:    Red Flags:  None    C-SSRS Triggered by Intake questionnaire:   Patient answered 'NO' to both behavioral questions on intake.  No further screening warranted    Preferred Language for Healthcare:   [x] English       [] other:    SUBJECTIVE EXAMINATION     Patient stated complaint: Pt states he has been working on ROM and strengthening a lot over the weekend. Pt states he felt a big pop over the weekend in his knee but it felt looser with knee flexion after that. Pt states she sent MD message and some pictures and MD states knee looks good.        Test used Initial score  7/15/24 2024   Pain Summary VAS 6/10 4-5/10   Functional

## 2024-07-30 RX ORDER — ASPIRIN 325 MG
TABLET, DELAYED RELEASE (ENTERIC COATED) ORAL
Qty: 60 TABLET | Refills: 0 | Status: SHIPPED | OUTPATIENT
Start: 2024-07-30

## 2024-07-30 RX ORDER — TRAMADOL HYDROCHLORIDE 50 MG/1
50 TABLET ORAL EVERY 6 HOURS PRN
Qty: 28 TABLET | Refills: 0 | Status: SHIPPED | OUTPATIENT
Start: 2024-07-30 | End: 2024-08-06

## 2024-08-01 ENCOUNTER — HOSPITAL ENCOUNTER (OUTPATIENT)
Dept: PHYSICAL THERAPY | Age: 59
Setting detail: THERAPIES SERIES
Discharge: HOME | End: 2024-08-01
Payer: OTHER GOVERNMENT

## 2024-08-01 PROCEDURE — 97112 NEUROMUSCULAR REEDUCATION: CPT

## 2024-08-01 PROCEDURE — 97110 THERAPEUTIC EXERCISES: CPT

## 2024-08-01 PROCEDURE — 97140 MANUAL THERAPY 1/> REGIONS: CPT

## 2024-08-01 NOTE — FLOWSHEET NOTE
neuromuscular reeducation of movement, balance, coordination, kinesthetic sense, posture, and/or proprioception for sitting and/or standing activities  (91699) MANUAL THERAPY -  Manual therapy techniques, 1 or more regions, each 15 minutes (Mobilization/manipulation, manual lymphatic drainage, manual traction) for the purpose of modulating pain, promoting relaxation,  increasing ROM, reducing/eliminating soft tissue swelling/inflammation/restriction, improving soft tissue extensibility and allowing for proper ROM for normal function with self care, mobility, lifting and ambulation    GOALS     Patient stated goal: \"To rehab from this surgery\"  [x] Progressing: [] Met: [] Not Met: [] Adjusted    Therapist goals for Patient:   Short Term Goals: To be achieved in: 2 weeks  1. Independent in HEP and progression per patient tolerance, in order to prevent re-injury.   [] Progressing: [x] Met: [] Not Met: [] Adjusted  2. Patient will have a decrease in pain to <1-2/10 to facilitate improvement in movement, function, and ADLs as indicated by Functional Deficits.  [x] Progressing: [] Met: [] Not Met: [] Adjusted    Long Term Goals: To be achieved in: 12 weeks  1. Disability index score of 20% or less for the LEFS to assist with reaching prior level of function with activities such as getting into and out of car without difficulty.  [x] Progressing: [] Met: [] Not Met: [] Adjusted  2. Patient will demonstrate increased AROM of R Knee from 0 °  to 120 °  without pain to allow for proper joint functioning to enable patient to be able to ascend and descend steps reciprocally.   [x] Progressing: [] Met: [] Not Met: [] Adjusted  3. Patient will demonstrate increased Strength of R Hip/Knee to at least 4+/5 throughout without pain to allow for proper functional mobility to enable patient to return to improve TKE with ambulation.   [x] Progressing: [] Met: [] Not Met: [] Adjusted  4. Patient will return to walking all types of

## 2024-08-02 ENCOUNTER — OFFICE VISIT (OUTPATIENT)
Dept: ORTHOPEDIC SURGERY | Age: 59
End: 2024-08-02

## 2024-08-02 VITALS — HEIGHT: 73 IN | WEIGHT: 255 LBS | BODY MASS INDEX: 33.8 KG/M2

## 2024-08-02 DIAGNOSIS — Z96.651 S/P TOTAL KNEE REPLACEMENT, RIGHT: Primary | ICD-10-CM

## 2024-08-02 DIAGNOSIS — M17.11 PRIMARY OSTEOARTHRITIS OF RIGHT KNEE: ICD-10-CM

## 2024-08-02 RX ORDER — IBUPROFEN 600 MG/1
600 TABLET ORAL 4 TIMES DAILY PRN
Qty: 120 TABLET | Refills: 1 | Status: SHIPPED | OUTPATIENT
Start: 2024-08-02

## 2024-08-02 RX ORDER — HYDROCODONE BITARTRATE AND ACETAMINOPHEN 5; 325 MG/1; MG/1
1 TABLET ORAL EVERY 4 HOURS PRN
Qty: 42 TABLET | Refills: 0 | Status: SHIPPED | OUTPATIENT
Start: 2024-08-02 | End: 2024-08-09

## 2024-08-02 RX ORDER — METHOCARBAMOL 750 MG/1
750 TABLET, FILM COATED ORAL 4 TIMES DAILY
Qty: 120 TABLET | Refills: 0 | Status: SHIPPED | OUTPATIENT
Start: 2024-08-02 | End: 2024-09-01

## 2024-08-02 NOTE — PROGRESS NOTES
He returns today for evaluation of 6-week status post right knee replacement.  He is actually doing fairly well walking driving and at this time has range of motion 0 to 110 degrees.  He has no signs of infection or DVT.  His soft tissue is remodeling around the knee and I feel is basically reached MMI and can be released for more ambulation activity including continuing formal physical therapy for another month but would recommend that he start engaging in the gym for home program for range of motion and strengthening and endurance.  I will find him MMI today, given refills of medication to include Robaxin, Norco, and ibuprofen.  He should return in 2 months.

## 2024-08-05 ENCOUNTER — HOSPITAL ENCOUNTER (OUTPATIENT)
Dept: PHYSICAL THERAPY | Age: 59
Setting detail: THERAPIES SERIES
Discharge: HOME | End: 2024-08-05
Payer: OTHER GOVERNMENT

## 2024-08-05 PROCEDURE — 97140 MANUAL THERAPY 1/> REGIONS: CPT

## 2024-08-05 PROCEDURE — 97110 THERAPEUTIC EXERCISES: CPT

## 2024-08-05 PROCEDURE — 97112 NEUROMUSCULAR REEDUCATION: CPT

## 2024-08-05 NOTE — FLOWSHEET NOTE
[facility]- Outpatient Rehabilitation and Therapy [address] [place] [address] office: [phone] fax: [phone]      Physical Therapy: TREATMENT/PROGRESS NOTE   Patient: Nakul Parkinson (59 y.o. male)   Examination Date: 2024   :  1965 MRN: [id number]   Visit #: 6   Insurance Allowable Auth Needed   18 visits (-24) []Yes    []No    Insurance: Payor: [employer] DEPARTMENT OF LABOR OWCP / Plan: [employer] OF LABOR OWCP / Product Type: *No Product type* /   Insurance ID: [id number] - (Worker's Comp)  Secondary Insurance (if applicable):    Treatment Diagnosis:     ICD-10-CM    1. Right knee pain, unspecified chronicity  M25.561       2. Muscle weakness of lower extremity  M62.81          Medical Diagnosis:  Primary osteoarthritis of right knee [M17.11] s/p R TKA 2024   Referring Physician: Hernando Iqbal MD  PCP: Hernando Wing MD     Plan of care signed (Y/N):     Date of Patient follow up with Physician:      Progress Report/POC: NO  POC update due: (10 visits /OR AUTH LIMITS, whichever is less)  2024                                             Precautions/ Contra-indications:           Latex allergy:  NO  Pacemaker:    NO  Contraindications for Manipulation: None  Date of Surgery: 2024  Other:    Red Flags:  None    C-SSRS Triggered by Intake questionnaire:   Patient answered 'NO' to both behavioral questions on intake.  No further screening warranted    Preferred Language for Healthcare:   [x] English       [] other:    24    Progress Notes  Hernando Iqbal MD (Physician)  Orthopedic Surgery  He returns today for evaluation of 6-week status post right knee replacement.  He is actually doing fairly well walking driving and at this time has range of motion 0 to 110 degrees.  He has no signs of infection or DVT.  His soft tissue is remodeling around the knee and I feel is basically reached MMI and can be released for more ambulation

## 2024-08-08 ENCOUNTER — HOSPITAL ENCOUNTER (OUTPATIENT)
Dept: PHYSICAL THERAPY | Age: 59
Setting detail: THERAPIES SERIES
Discharge: HOME | End: 2024-08-08
Payer: OTHER GOVERNMENT

## 2024-08-08 PROCEDURE — 97140 MANUAL THERAPY 1/> REGIONS: CPT

## 2024-08-08 PROCEDURE — 97110 THERAPEUTIC EXERCISES: CPT

## 2024-08-08 PROCEDURE — 97112 NEUROMUSCULAR REEDUCATION: CPT

## 2024-08-08 NOTE — FLOWSHEET NOTE
[facility]- Outpatient Rehabilitation and Therapy [address]. [place] [address] office: [phone] fax: [phone]      Physical Therapy: TREATMENT/PROGRESS NOTE   Patient: Nakul Parkinson (59 y.o. male)   Examination Date: 2024   :  1965 MRN: [id number]   Visit #: 7   Insurance Allowable Auth Needed   18 visits (-24) []Yes    []No    Insurance: Payor: [employer] DEPARTMENT OF LABOR OWCP / Plan: [employer] OF LABOR OWCP / Product Type: *No Product type* /   Insurance ID: [id number] - (Worker's Comp)  Secondary Insurance (if applicable):    Treatment Diagnosis:     ICD-10-CM    1. Right knee pain, unspecified chronicity  M25.561       2. Muscle weakness of lower extremity  M62.81          Medical Diagnosis:  Primary osteoarthritis of right knee [M17.11] s/p R TKA 2024   Referring Physician: Hernando Iqbal MD  PCP: Hernando Wing MD     Plan of care signed (Y/N):     Date of Patient follow up with Physician:      Progress Report/POC: NO  POC update due: (10 visits /OR AUTH LIMITS, whichever is less)  2024                                             Precautions/ Contra-indications:           Latex allergy:  NO  Pacemaker:    NO  Contraindications for Manipulation: None  Date of Surgery: 2024  Other:    Red Flags:  None    C-SSRS Triggered by Intake questionnaire:   Patient answered 'NO' to both behavioral questions on intake.  No further screening warranted    Preferred Language for Healthcare:   [x] English       [] other:    24    Progress Notes  Hernando Iqbal MD (Physician)  Orthopedic Surgery  He returns today for evaluation of 6-week status post right knee replacement.  He is actually doing fairly well walking driving and at this time has range of motion 0 to 110 degrees.  He has no signs of infection or DVT.  His soft tissue is remodeling around the knee and I feel is basically reached MMI and can be released for more ambulation

## 2024-08-12 ENCOUNTER — HOSPITAL ENCOUNTER (OUTPATIENT)
Dept: PHYSICAL THERAPY | Age: 59
Setting detail: THERAPIES SERIES
Discharge: HOME | End: 2024-08-12
Payer: OTHER GOVERNMENT

## 2024-08-12 PROCEDURE — 97112 NEUROMUSCULAR REEDUCATION: CPT

## 2024-08-12 PROCEDURE — 97140 MANUAL THERAPY 1/> REGIONS: CPT

## 2024-08-12 PROCEDURE — 97110 THERAPEUTIC EXERCISES: CPT

## 2024-08-12 NOTE — PLAN OF CARE
Re-Eval (26549)     Manual (43724) 9:00-9:15 15' 1  Estim Unattended (90988)     Ther. Act (17142)     Mech. Traction (31808)     Gait (70775)     Dry Needle 1-2 muscle (20560)     Aquatic Therex (50435)     Dry Needle 3+ muscle (20561)     Iontophoresis (65212)     VASO (18154) X    Ultrasound (19111)     Group Therapy (24239)     Estim Attended (37869)     Canalith Repositioning (99802)     Other:     Other:     Total Timed Code Tx Minutes  60' 4       Total Treatment Minutes 65        Charge Justification:  (48336) THERAPEUTIC EXERCISE - Provided verbal/tactile cueing for activities related to strengthening, flexibility, endurance, ROM performed to prevent loss of range of motion, maintain or improve muscular strength or increase flexibility, following either an injury or surgery.   (18956) NEUROMUSCULAR RE-EDUCATION - Therapeutic procedure, 1 or more areas, each 15 minutes; neuromuscular reeducation of movement, balance, coordination, kinesthetic sense, posture, and/or proprioception for sitting and/or standing activities  (55052) MANUAL THERAPY -  Manual therapy techniques, 1 or more regions, each 15 minutes (Mobilization/manipulation, manual lymphatic drainage, manual traction) for the purpose of modulating pain, promoting relaxation,  increasing ROM, reducing/eliminating soft tissue swelling/inflammation/restriction, improving soft tissue extensibility and allowing for proper ROM for normal function with self care, mobility, lifting and ambulation    GOALS     Patient stated goal: \"To rehab from this surgery\"  [x] Progressing: [] Met: [] Not Met: [] Adjusted    Therapist goals for Patient:   Short Term Goals: To be achieved in: 2 weeks  1. Independent in HEP and progression per patient tolerance, in order to prevent re-injury.   [] Progressing: [x] Met: [] Not Met: [] Adjusted  2. Patient will have a decrease in pain to <1-2/10 to facilitate improvement in movement, function, and ADLs as indicated by

## 2024-08-13 ENCOUNTER — APPOINTMENT (OUTPATIENT)
Dept: PHYSICAL THERAPY | Age: 59
End: 2024-08-13
Payer: OTHER GOVERNMENT

## 2024-08-15 ENCOUNTER — HOSPITAL ENCOUNTER (OUTPATIENT)
Dept: PHYSICAL THERAPY | Age: 59
Setting detail: THERAPIES SERIES
Discharge: HOME | End: 2024-08-15
Payer: OTHER GOVERNMENT

## 2024-08-15 PROCEDURE — 97110 THERAPEUTIC EXERCISES: CPT

## 2024-08-15 PROCEDURE — 97140 MANUAL THERAPY 1/> REGIONS: CPT

## 2024-08-15 PROCEDURE — 97112 NEUROMUSCULAR REEDUCATION: CPT

## 2024-08-15 NOTE — FLOWSHEET NOTE
[facility]- Outpatient Rehabilitation and Therapy [address]. [place] [address] office: [phone] fax: [phone]    Physical Therapy: TREATMENT/PROGRESS NOTE   Patient: Nakul Parkinson (59 y.o. male)   Examination Date: 08/15/2024   :  1965 MRN: [id number]   Visit #: 9   Insurance Allowable Auth Needed   18 visits (-24) []Yes    []No    Insurance: Payor: [email or web address] DEPARTMENT OF LABOR OWCP / Plan: [employer] OF LABOR OWCP / Product Type: *No Product type* /   Insurance ID: [id number] - (Worker's Comp)  Secondary Insurance (if applicable):    Treatment Diagnosis:     ICD-10-CM    1. Right knee pain, unspecified chronicity  M25.561       2. Muscle weakness of lower extremity  M62.81          Medical Diagnosis:  Primary osteoarthritis of right knee [M17.11] s/p R TKA 2024   Referring Physician: Hernando Iqbal MD  PCP: Hernando Wing MD     Plan of care signed (Y/N):     Date of Patient follow up with Physician:      Progress Report/POC: NO  POC update due: (10 visits /OR AUTH LIMITS, whichever is less)  9/10/24                                            Precautions/ Contra-indications:           Latex allergy:  NO  Pacemaker:    NO  Contraindications for Manipulation: None  Date of Surgery: 2024  Other:    Red Flags:  None    C-SSRS Triggered by Intake questionnaire:   Patient answered 'NO' to both behavioral questions on intake.  No further screening warranted    Preferred Language for Healthcare:   [x] English       [] other:    24    Progress Notes  Hernando Iqbal MD (Physician)  Orthopedic Surgery  He returns today for evaluation of 6-week status post right knee replacement.  He is actually doing fairly well walking driving and at this time has range of motion 0 to 110 degrees.  He has no signs of infection or DVT.  His soft tissue is remodeling around the knee and I feel is basically reached MMI and can be released for more ambulation

## 2024-08-19 ENCOUNTER — HOSPITAL ENCOUNTER (OUTPATIENT)
Dept: PHYSICAL THERAPY | Age: 59
Setting detail: THERAPIES SERIES
Discharge: HOME | End: 2024-08-19
Payer: OTHER GOVERNMENT

## 2024-08-19 PROCEDURE — 97110 THERAPEUTIC EXERCISES: CPT

## 2024-08-19 PROCEDURE — 97140 MANUAL THERAPY 1/> REGIONS: CPT

## 2024-08-19 PROCEDURE — 97112 NEUROMUSCULAR REEDUCATION: CPT

## 2024-08-19 NOTE — FLOWSHEET NOTE
[facility]- Outpatient Rehabilitation and Therapy [address] [place] [address] office: [phone] fax: [phone]    Physical Therapy: TREATMENT/PROGRESS NOTE   Patient: Nakul Parkinson (59 y.o. male)   Examination Date: 2024   :  1965 MRN: [id number]   Visit #: 10   Insurance Allowable Auth Needed   18 visits (-24) []Yes    []No    Insurance: Payor: [employer] DEPARTMENT OF LABOR OWCP / Plan: [employer] OF LABOR OWCP / Product Type: *No Product type* /   Insurance ID: [id number] - (Worker's Comp)  Secondary Insurance (if applicable):    Treatment Diagnosis:     ICD-10-CM    1. Right knee pain, unspecified chronicity  M25.561       2. Muscle weakness of lower extremity  M62.81          Medical Diagnosis:  Primary osteoarthritis of right knee [M17.11] s/p R TKA 2024   Referring Physician: Hernando Iqbal MD  PCP: Hernando Wing MD     Plan of care signed (Y/N):     Date of Patient follow up with Physician:      Progress Report/POC: NO  POC update due: (10 visits /OR AUTH LIMITS, whichever is less)  9/10/24                                            Precautions/ Contra-indications:           Latex allergy:  NO  Pacemaker:    NO  Contraindications for Manipulation: None  Date of Surgery: 2024  Other:    Red Flags:  None    C-SSRS Triggered by Intake questionnaire:   Patient answered 'NO' to both behavioral questions on intake.  No further screening warranted    Preferred Language for Healthcare:   [x] English       [] other:    24    Progress Notes  Hernando Iqbal MD (Physician)  Orthopedic Surgery  He returns today for evaluation of 6-week status post right knee replacement.  He is actually doing fairly well walking driving and at this time has range of motion 0 to 110 degrees.  He has no signs of infection or DVT.  His soft tissue is remodeling around the knee and I feel is basically reached MMI and can be released for more ambulation

## 2024-08-22 ENCOUNTER — TELEPHONE (OUTPATIENT)
Dept: ORTHOPEDIC SURGERY | Age: 59
End: 2024-08-22

## 2024-08-22 ENCOUNTER — HOSPITAL ENCOUNTER (OUTPATIENT)
Dept: PHYSICAL THERAPY | Age: 59
Setting detail: THERAPIES SERIES
Discharge: HOME | End: 2024-08-22
Payer: OTHER GOVERNMENT

## 2024-08-22 PROCEDURE — 97140 MANUAL THERAPY 1/> REGIONS: CPT

## 2024-08-22 PROCEDURE — 97110 THERAPEUTIC EXERCISES: CPT

## 2024-08-22 PROCEDURE — 97112 NEUROMUSCULAR REEDUCATION: CPT

## 2024-08-22 NOTE — FLOWSHEET NOTE
[facility]- Outpatient Rehabilitation and Therapy [address]. [place] [address] office: [phone] fax: [phone]    Physical Therapy: TREATMENT/PROGRESS NOTE   Patient: Nakul Parkinson (59 y.o. male)   Examination Date: 2024   :  1965 MRN: [id number]   Visit #: 11   Insurance Allowable Auth Needed   18 visits (-24) []Yes    []No    Insurance: Payor: [employer] DEPARTMENT OF LABOR OWCP / Plan: [employer] OF LABOR OWCP / Product Type: *No Product type* /   Insurance ID: [id number] - (Worker's Comp)  Secondary Insurance (if applicable):    Treatment Diagnosis:     ICD-10-CM    1. Right knee pain, unspecified chronicity  M25.561       2. Muscle weakness of lower extremity  M62.81          Medical Diagnosis:  Primary osteoarthritis of right knee [M17.11] s/p R TKA 2024   Referring Physician: Hernando Iqbal MD  PCP: Hernando Wing MD     Plan of care signed (Y/N):     Date of Patient follow up with Physician:      Progress Report/POC: NO  POC update due: (10 visits /OR AUTH LIMITS, whichever is less)  9/10/24                                            Precautions/ Contra-indications:           Latex allergy:  NO  Pacemaker:    NO  Contraindications for Manipulation: None  Date of Surgery: 2024  Other:    Red Flags:  None    C-SSRS Triggered by Intake questionnaire:   Patient answered 'NO' to both behavioral questions on intake.  No further screening warranted    Preferred Language for Healthcare:   [x] English       [] other:    24    Progress Notes  Hernando Iqbal MD (Physician)  Orthopedic Surgery  He returns today for evaluation of 6-week status post right knee replacement.  He is actually doing fairly well walking driving and at this time has range of motion 0 to 110 degrees.  He has no signs of infection or DVT.  His soft tissue is remodeling around the knee and I feel is basically reached MMI and can be released for more ambulation

## 2024-08-22 NOTE — TELEPHONE ENCOUNTER
S/w patient he stated that he would like to have his prescription increase to 2 tab BID instead of the 1 tab BID. He stated Dr. Iqbal said that was okay but now he is running out of medication from what the prescription was previously written for.    He would like a call back from clinic if the prescription is or isn't able to be done.    Ph: [phone]

## 2024-08-23 DIAGNOSIS — M17.11 PRIMARY OSTEOARTHRITIS OF RIGHT KNEE: Primary | ICD-10-CM

## 2024-08-23 RX ORDER — NABUMETONE 500 MG/1
TABLET, FILM COATED ORAL
Qty: 240 TABLET | Refills: 1
Start: 2024-08-23

## 2024-08-26 ENCOUNTER — HOSPITAL ENCOUNTER (OUTPATIENT)
Dept: PHYSICAL THERAPY | Age: 59
Setting detail: THERAPIES SERIES
Discharge: HOME | End: 2024-08-26
Payer: OTHER GOVERNMENT

## 2024-08-26 ENCOUNTER — TELEPHONE (OUTPATIENT)
Dept: ORTHOPEDIC SURGERY | Age: 59
End: 2024-08-26

## 2024-08-26 DIAGNOSIS — M17.11 PRIMARY OSTEOARTHRITIS OF RIGHT KNEE: ICD-10-CM

## 2024-08-26 PROCEDURE — 97112 NEUROMUSCULAR REEDUCATION: CPT

## 2024-08-26 PROCEDURE — 97140 MANUAL THERAPY 1/> REGIONS: CPT

## 2024-08-26 PROCEDURE — 97110 THERAPEUTIC EXERCISES: CPT

## 2024-08-26 RX ORDER — NABUMETONE 500 MG/1
TABLET, FILM COATED ORAL
Qty: 240 TABLET | Refills: 1 | Status: SHIPPED | OUTPATIENT
Start: 2024-08-26

## 2024-08-26 NOTE — FLOWSHEET NOTE
activity including continuing formal physical therapy for another month but would recommend that he start engaging in the gym for home program for range of motion and strengthening and endurance.  I will find him MMI today, given refills of medication to include Robaxin, Norco, and ibuprofen.  He should return in 2 months.            SUBJECTIVE EXAMINATION     Patient stated complaint: ( 9 weeks post-op 8/26/24) Pt states he has been going to the gym regularly, pt states he went to a concert over the weekend and pt states he had trouble walking up and down the incline.       Test used Initial score  7/15/24 08/26/2024   Pain Summary VAS 6/10 4/10   Functional questionnaire LEFS 14/80  83% 31/80  61%   Other:   AAROM 5-101  PROM 0-107  with max over pressure           OBJECTIVE EXAMINATION     7/15/24  ROM/Strength: (Blank cells denote NT)      Mvmt (norm) AROM L AROM R Notes PROM L PROM R Notes     LUMBAR Flex (90)         Ext (25)         SB (25)          Rotation (30)             HIP Flex (120)          Abd (45)          ER (50)          IR (45)          Ext (20)         KNEE Flex (140)  AAROM   80 °      83 °  R Knee Girth  19\" Patella  21 1/2\" Thigh  16 3/4\" calf    Ext (0)  Lacking 14 °    Lacking 10 °       ANKLE DF (20)          PF (50)          Inversion (30)          Eversion (20)               MMT L MMT R Notes       HIP  Flexion        Abduction        ER        IR        Extension      KNEE  Flexion        Extension        ANKLE  DF        PF        Inversion        Eversion             Exercises/Interventions     Therapeutic Ex (87951)  resistance Sets/time Reps Notes/Cues/Progressions   Bike  5'  Rocking motion          Heel Slides  10\" 15 x  Seated with back against wall          Supine Clamshells Green TB  20 x     Supine Bridges Green TB  20 x            SLR 1# 3 x  10 x     SSLR   20 x            HL Hip Add Squeeze  5\" 20 x            Slant board stretch  30\" 3 x            Standing Marches 3# 2 10  interventions:    Interventions:  Therapeutic Exercise (37490) including: strength training, ROM, and functional mobility  Therapeutic Activities (61138) including: functional mobility training and education.  Neuromuscular Re-education (52301) activation and proprioception, including postural re-education.    Gait Training (54733) for normalization of ambulation patterns and AD training.   Manual Therapy (52534) as indicated to include: Passive Range of Motion, Gr I-IV mobilizations, Soft Tissue Mobilization, Trigger Point Release, and Myofascial Release  Modalities as needed that may include: Cryotherapy, Electrical Stimulation, Thermal Agents, and Vasoneumatic Compression  Patient education on joint protection, postural re-education, activity modification, and progression of HEP    Plan: Cont POC- Continue emphasis/focus on exercise progression, improving proper muscle recruitment and activation/motor control patterns, modulating pain, increasing ROM, reduce/eliminate soft tissue swelling/inflammation/restriction, allowing for proper ROM, and static and dynamic balance. Next visit plan to continue current phase     Electronically Signed by NAYELI ALBERTS, PTA  Date: 08/26/2024     Note: Portions of this note have been templated and/or copied from initial evaluation, reassessments and prior notes for documentation efficiency.    Note: If patient does not return for scheduled/recommended follow up visits, this note will serve as a discharge from care along with the most recent update on progress.    Ortho Evaluation

## 2024-08-26 NOTE — TELEPHONE ENCOUNTER
Received a call from  following up on his prescription that was suppose to be called in.    Ph: [phone]

## 2024-08-29 ENCOUNTER — HOSPITAL ENCOUNTER (OUTPATIENT)
Dept: PHYSICAL THERAPY | Age: 59
Setting detail: THERAPIES SERIES
Discharge: HOME | End: 2024-08-29
Payer: OTHER GOVERNMENT

## 2024-08-29 PROCEDURE — 97112 NEUROMUSCULAR REEDUCATION: CPT

## 2024-08-29 PROCEDURE — 97140 MANUAL THERAPY 1/> REGIONS: CPT

## 2024-08-29 PROCEDURE — 97110 THERAPEUTIC EXERCISES: CPT

## 2024-08-29 NOTE — FLOWSHEET NOTE
[facility]- Outpatient Rehabilitation and Therapy [address] [place] [address] office: [phone] fax: [phone]    Physical Therapy: TREATMENT/PROGRESS NOTE   Patient: Nakul Parkinson (59 y.o. male)   Examination Date: 2024   :  1965 MRN: [id number]   Visit #: 13   Insurance Allowable Auth Needed   18 visits (-24) []Yes    []No    Insurance: Payor: [employer] DEPARTMENT OF LABOR OWCP / Plan: [employer] OF LABOR OWCP / Product Type: *No Product type* /   Insurance ID: [id number] - (Worker's Comp)  Secondary Insurance (if applicable):    Treatment Diagnosis:     ICD-10-CM    1. Right knee pain, unspecified chronicity  M25.561       2. Muscle weakness of lower extremity  M62.81          Medical Diagnosis:  Primary osteoarthritis of right knee [M17.11] s/p R TKA 2024   Referring Physician: Hernando Iqbal MD  PCP: Hernando Wing MD     Plan of care signed (Y/N):     Date of Patient follow up with Physician:      Progress Report/POC: NO  POC update due: (10 visits /OR AUTH LIMITS, whichever is less)  9/10/24                                            Precautions/ Contra-indications:           Latex allergy:  NO  Pacemaker:    NO  Contraindications for Manipulation: None  Date of Surgery: 2024  Other:    Red Flags:  None    C-SSRS Triggered by Intake questionnaire:   Patient answered 'NO' to both behavioral questions on intake.  No further screening warranted    Preferred Language for Healthcare:   [x] English       [] other:    24    Progress Notes  Hernando Iqbal MD (Physician)  Orthopedic Surgery  He returns today for evaluation of 6-week status post right knee replacement.  He is actually doing fairly well walking driving and at this time has range of motion 0 to 110 degrees.  He has no signs of infection or DVT.  His soft tissue is remodeling around the knee and I feel is basically reached MMI and can be released for more ambulation      Frequency/Duration:  1-3 /week for  12  weeks for the following treatment interventions:    Interventions:  Therapeutic Exercise (61048) including: strength training, ROM, and functional mobility  Therapeutic Activities (91141) including: functional mobility training and education.  Neuromuscular Re-education (21356) activation and proprioception, including postural re-education.    Gait Training (26077) for normalization of ambulation patterns and AD training.   Manual Therapy (79528) as indicated to include: Passive Range of Motion, Gr I-IV mobilizations, Soft Tissue Mobilization, Trigger Point Release, and Myofascial Release  Modalities as needed that may include: Cryotherapy, Electrical Stimulation, Thermal Agents, and Vasoneumatic Compression  Patient education on joint protection, postural re-education, activity modification, and progression of HEP    Plan: Cont POC- Continue emphasis/focus on exercise progression, improving proper muscle recruitment and activation/motor control patterns, modulating pain, increasing ROM, reduce/eliminate soft tissue swelling/inflammation/restriction, allowing for proper ROM, and static and dynamic balance. Next visit plan to continue current phase     Electronically Signed by NAYELI ALBERTS, PTA  Date: 08/29/2024     Note: Portions of this note have been templated and/or copied from initial evaluation, reassessments and prior notes for documentation efficiency.    Note: If patient does not return for scheduled/recommended follow up visits, this note will serve as a discharge from care along with the most recent update on progress.    Ortho Evaluation

## 2024-09-03 ENCOUNTER — HOSPITAL ENCOUNTER (OUTPATIENT)
Dept: PHYSICAL THERAPY | Age: 59
Setting detail: THERAPIES SERIES
Discharge: HOME | End: 2024-09-03
Payer: OTHER GOVERNMENT

## 2024-09-03 PROCEDURE — 97110 THERAPEUTIC EXERCISES: CPT

## 2024-09-03 PROCEDURE — 97112 NEUROMUSCULAR REEDUCATION: CPT

## 2024-09-03 PROCEDURE — 97140 MANUAL THERAPY 1/> REGIONS: CPT

## 2024-09-03 RX ORDER — MUPIROCIN 20 MG/G
OINTMENT TOPICAL
Qty: 22 G | OUTPATIENT
Start: 2024-09-03

## 2024-09-03 NOTE — FLOWSHEET NOTE
mobility  Therapeutic Activities (87735) including: functional mobility training and education.  Neuromuscular Re-education (17736) activation and proprioception, including postural re-education.    Gait Training (78094) for normalization of ambulation patterns and AD training.   Manual Therapy (24279) as indicated to include: Passive Range of Motion, Gr I-IV mobilizations, Soft Tissue Mobilization, Trigger Point Release, and Myofascial Release  Modalities as needed that may include: Cryotherapy, Electrical Stimulation, Thermal Agents, and Vasoneumatic Compression  Patient education on joint protection, postural re-education, activity modification, and progression of HEP    Plan: Cont POC- Continue emphasis/focus on exercise progression, improving proper muscle recruitment and activation/motor control patterns, modulating pain, increasing ROM, reduce/eliminate soft tissue swelling/inflammation/restriction, allowing for proper ROM, and static and dynamic balance. Next visit plan to continue current phase     Electronically Signed by NAYELI ALBERTS, PTA  Date: 09/03/2024     Note: Portions of this note have been templated and/or copied from initial evaluation, reassessments and prior notes for documentation efficiency.    Note: If patient does not return for scheduled/recommended follow up visits, this note will serve as a discharge from care along with the most recent update on progress.    Ortho Evaluation

## 2024-09-05 ENCOUNTER — HOSPITAL ENCOUNTER (OUTPATIENT)
Dept: PHYSICAL THERAPY | Age: 59
Setting detail: THERAPIES SERIES
Discharge: HOME | End: 2024-09-05
Payer: OTHER GOVERNMENT

## 2024-09-05 PROCEDURE — 97140 MANUAL THERAPY 1/> REGIONS: CPT

## 2024-09-05 PROCEDURE — 97112 NEUROMUSCULAR REEDUCATION: CPT

## 2024-09-05 PROCEDURE — 97110 THERAPEUTIC EXERCISES: CPT

## 2024-09-05 NOTE — PLAN OF CARE
[facility] [address] [place] [address] office: [phone] fax: [phone]  Physical Therapy Re-Certification Plan of Care    Dear Hernando Iqbal MD  ,    We had the pleasure of treating the following patient for physical therapy services at [facility]. A summary of our findings can be found in the updated assessment below.  This includes our plan of care.  If you have any questions or concerns regarding these findings, please do not hesitate to contact me at the office phone number checked above.  Thank you for the referral.     Physician Signature:________________________________Date:__________________  By signing above (or electronic signature), therapist's plan is approved by physician      Functional Outcome: JENNIE Parkinson 1965 continues to present with functional deficits in ROM, joint mobility, strength symmetry, flexibility, and endurance of strength  limiting ability with walking on even ground, walking on uneven ground, managing community ambulation, walking up/down stairs, navigate curbs/steps, transitions between positions, reaching overhead, and lifting items .  During therapy this date, patient required visual cueing and verbal cueing for exercise progression, promoting relaxation, increasing ROM, reduce/eliminate soft tissue swelling/inflammation/restriction, and allowing for proper ROM. Patient will continue to benefit from ongoing evaluation and advanced clinical decision from a Physical Therapist to improve pain control, ROM, muscle strength, endurance, balance and proprioception, and functional mobility to safely return to [facility] without symptoms or restrictions.    Overall Response to Treatment:  Patient is responding well to treatment and improvement is noted with regards to goals    Total Visits: 15     Recommendation:    [x] Continue PT 1-2x / wk for 4 weeks.   [] Hold PT,

## 2024-09-09 ENCOUNTER — HOSPITAL ENCOUNTER (OUTPATIENT)
Dept: PHYSICAL THERAPY | Age: 59
Setting detail: THERAPIES SERIES
Discharge: HOME | End: 2024-09-09
Payer: OTHER GOVERNMENT

## 2024-09-09 PROCEDURE — 97140 MANUAL THERAPY 1/> REGIONS: CPT

## 2024-09-09 PROCEDURE — 97112 NEUROMUSCULAR REEDUCATION: CPT

## 2024-09-09 PROCEDURE — 97110 THERAPEUTIC EXERCISES: CPT

## 2024-09-12 ENCOUNTER — HOSPITAL ENCOUNTER (OUTPATIENT)
Dept: PHYSICAL THERAPY | Age: 59
Setting detail: THERAPIES SERIES
Discharge: HOME | End: 2024-09-12
Payer: OTHER GOVERNMENT

## 2024-09-12 PROCEDURE — 97110 THERAPEUTIC EXERCISES: CPT

## 2024-09-12 PROCEDURE — 97140 MANUAL THERAPY 1/> REGIONS: CPT

## 2024-09-12 PROCEDURE — 97112 NEUROMUSCULAR REEDUCATION: CPT

## 2024-09-16 ENCOUNTER — HOSPITAL ENCOUNTER (OUTPATIENT)
Dept: PHYSICAL THERAPY | Age: 59
Setting detail: THERAPIES SERIES
Discharge: HOME | End: 2024-09-16
Payer: OTHER GOVERNMENT

## 2024-09-16 ENCOUNTER — APPOINTMENT (OUTPATIENT)
Dept: PHYSICAL THERAPY | Age: 59
End: 2024-09-16
Payer: OTHER GOVERNMENT

## 2024-09-16 PROCEDURE — 97110 THERAPEUTIC EXERCISES: CPT

## 2024-09-16 PROCEDURE — 97140 MANUAL THERAPY 1/> REGIONS: CPT

## 2024-09-16 PROCEDURE — 97112 NEUROMUSCULAR REEDUCATION: CPT

## 2024-09-17 ENCOUNTER — APPOINTMENT (OUTPATIENT)
Dept: PHYSICAL THERAPY | Age: 59
End: 2024-09-17
Payer: OTHER GOVERNMENT

## 2024-09-18 ENCOUNTER — OFFICE VISIT (OUTPATIENT)
Dept: FAMILY MEDICINE CLINIC | Age: 59
End: 2024-09-18

## 2024-09-18 VITALS
WEIGHT: 279 LBS | BODY MASS INDEX: 36.81 KG/M2 | SYSTOLIC BLOOD PRESSURE: 136 MMHG | OXYGEN SATURATION: 94 % | DIASTOLIC BLOOD PRESSURE: 88 MMHG | HEART RATE: 84 BPM

## 2024-09-18 DIAGNOSIS — E55.9 VITAMIN D DEFICIENCY: ICD-10-CM

## 2024-09-18 DIAGNOSIS — G56.01 CARPAL TUNNEL SYNDROME ON RIGHT: ICD-10-CM

## 2024-09-18 DIAGNOSIS — M17.11 PRIMARY OSTEOARTHRITIS OF RIGHT KNEE: ICD-10-CM

## 2024-09-18 DIAGNOSIS — E11.9 TYPE 2 DIABETES MELLITUS WITHOUT COMPLICATION, WITHOUT LONG-TERM CURRENT USE OF INSULIN (HCC): ICD-10-CM

## 2024-09-18 DIAGNOSIS — M25.561 ACUTE PAIN OF RIGHT KNEE: ICD-10-CM

## 2024-09-18 DIAGNOSIS — Z96.651 S/P TOTAL KNEE REPLACEMENT, RIGHT: ICD-10-CM

## 2024-09-18 DIAGNOSIS — I10 ESSENTIAL HYPERTENSION: Primary | ICD-10-CM

## 2024-09-18 DIAGNOSIS — E78.2 MIXED HYPERLIPIDEMIA: ICD-10-CM

## 2024-09-18 DIAGNOSIS — K21.00 GASTROESOPHAGEAL REFLUX DISEASE WITH ESOPHAGITIS WITHOUT HEMORRHAGE: ICD-10-CM

## 2024-09-18 LAB — 25(OH)D3 SERPL-MCNC: 19.7 NG/ML

## 2024-09-18 RX ORDER — METHOCARBAMOL 750 MG/1
TABLET, FILM COATED ORAL
Qty: 120 TABLET | Refills: 0
Start: 2024-09-18

## 2024-09-18 RX ORDER — ASPIRIN 325 MG
325 TABLET, DELAYED RELEASE (ENTERIC COATED) ORAL DAILY
Qty: 60 TABLET | Refills: 3 | Status: SHIPPED | OUTPATIENT
Start: 2024-09-18

## 2024-09-18 RX ORDER — ACETAMINOPHEN 500 MG
500 TABLET ORAL EVERY 6 HOURS PRN
Qty: 100 TABLET | Refills: 1 | Status: SHIPPED | OUTPATIENT
Start: 2024-09-18

## 2024-09-19 ENCOUNTER — APPOINTMENT (OUTPATIENT)
Dept: PHYSICAL THERAPY | Age: 59
End: 2024-09-19
Payer: OTHER GOVERNMENT

## 2024-09-23 ENCOUNTER — APPOINTMENT (OUTPATIENT)
Dept: PHYSICAL THERAPY | Age: 59
End: 2024-09-23
Payer: OTHER GOVERNMENT

## 2024-09-26 ENCOUNTER — APPOINTMENT (OUTPATIENT)
Dept: PHYSICAL THERAPY | Age: 59
End: 2024-09-26
Payer: OTHER GOVERNMENT

## 2024-09-30 ENCOUNTER — APPOINTMENT (OUTPATIENT)
Dept: PHYSICAL THERAPY | Age: 59
End: 2024-09-30
Payer: OTHER GOVERNMENT

## 2024-10-04 ENCOUNTER — OFFICE VISIT (OUTPATIENT)
Dept: ORTHOPEDIC SURGERY | Age: 59
End: 2024-10-04

## 2024-10-04 VITALS — HEIGHT: 73 IN | BODY MASS INDEX: 36.98 KG/M2 | WEIGHT: 279 LBS

## 2024-10-04 DIAGNOSIS — M17.11 PRIMARY OSTEOARTHRITIS OF RIGHT KNEE: Primary | ICD-10-CM

## 2024-10-04 DIAGNOSIS — Z96.651 S/P TOTAL KNEE REPLACEMENT, RIGHT: ICD-10-CM

## 2024-10-04 RX ORDER — METHOCARBAMOL 750 MG/1
750 TABLET, FILM COATED ORAL 4 TIMES DAILY
Qty: 40 TABLET | Refills: 0 | Status: SHIPPED | OUTPATIENT
Start: 2024-10-04 | End: 2024-10-14

## 2024-10-04 RX ORDER — TRAMADOL HYDROCHLORIDE 50 MG/1
50 TABLET ORAL EVERY 6 HOURS PRN
Qty: 28 TABLET | Refills: 0 | Status: SHIPPED | OUTPATIENT
Start: 2024-10-04 | End: 2024-10-11

## 2024-10-04 RX ORDER — MUPIROCIN 20 MG/G
OINTMENT TOPICAL
Qty: 22 G | OUTPATIENT
Start: 2024-10-04

## 2024-10-04 NOTE — PROGRESS NOTES
He returns today for evaluation.  He is down 10 weeks status post right total knee replacement.  He has finished physical therapy but at this time still has moderate pain at extremes and range of motion of 0 to 110 degrees.  He has no effusion or signs of infection or DVT.  His condition are worst some issues with activities of daily living.  He has no signs of infection DVT and good stability of his knee.  He has no crepitus.    Impression: Right knee osteoarthritis status post right total knee replacement    Recommendation: Continue protected activity and slow return to normal activity over the next several months.  He should return in 3 months for recheck.  I will give him refills of medication they can use sparingly.

## 2024-10-22 RX ORDER — METHOCARBAMOL 750 MG/1
TABLET, FILM COATED ORAL
Qty: 40 TABLET | Refills: 0 | Status: SHIPPED | OUTPATIENT
Start: 2024-10-22

## 2024-11-21 RX ORDER — METHOCARBAMOL 750 MG/1
TABLET, FILM COATED ORAL
Qty: 40 TABLET | Refills: 0 | Status: SHIPPED | OUTPATIENT
Start: 2024-11-21

## 2024-12-16 DIAGNOSIS — M17.11 PRIMARY OSTEOARTHRITIS OF RIGHT KNEE: ICD-10-CM

## 2024-12-16 RX ORDER — NABUMETONE 500 MG/1
TABLET, FILM COATED ORAL
Qty: 205 TABLET | Refills: 0 | Status: SHIPPED | OUTPATIENT
Start: 2024-12-16

## 2024-12-17 RX ORDER — METHOCARBAMOL 750 MG/1
TABLET, FILM COATED ORAL
Qty: 40 TABLET | Refills: 0 | Status: SHIPPED | OUTPATIENT
Start: 2024-12-17

## 2024-12-20 ENCOUNTER — TELEPHONE (OUTPATIENT)
Dept: ORTHOPEDIC SURGERY | Age: 59
End: 2024-12-20

## 2024-12-26 ENCOUNTER — OFFICE VISIT (OUTPATIENT)
Dept: ORTHOPEDIC SURGERY | Age: 59
End: 2024-12-26

## 2024-12-26 ENCOUNTER — PREP FOR PROCEDURE (OUTPATIENT)
Dept: ORTHOPEDIC SURGERY | Age: 59
End: 2024-12-26

## 2024-12-26 VITALS — BODY MASS INDEX: 36.98 KG/M2 | WEIGHT: 279 LBS | HEIGHT: 73 IN

## 2024-12-26 DIAGNOSIS — Z96.651 S/P TOTAL KNEE REPLACEMENT, RIGHT: Primary | ICD-10-CM

## 2024-12-26 DIAGNOSIS — M17.11 PRIMARY OSTEOARTHRITIS OF RIGHT KNEE: ICD-10-CM

## 2024-12-26 RX ORDER — CELECOXIB 200 MG/1
200 CAPSULE ORAL DAILY
Qty: 60 CAPSULE | Refills: 3 | Status: SHIPPED | OUTPATIENT
Start: 2024-12-26

## 2024-12-26 NOTE — PROGRESS NOTES
He returns today for evaluation at this point he says he is having some still persistent discomfort that he grades a level 4 from the right total knee replacement he had done in June 2024.  At this time I recommend to be changed from Relafen to Celebrex as an anti-inflammatory.  His range of motion is a little bit less than it was going about 105 to 110 degrees and would recommend he continue with aggressive range of motion on his own and return in 6 months

## 2024-12-31 ENCOUNTER — OFFICE VISIT (OUTPATIENT)
Dept: ORTHOPEDIC SURGERY | Age: 59
End: 2024-12-31

## 2024-12-31 VITALS — WEIGHT: 279 LBS | HEIGHT: 73 IN | BODY MASS INDEX: 36.98 KG/M2

## 2024-12-31 DIAGNOSIS — G56.01 CARPAL TUNNEL SYNDROME ON RIGHT: Primary | ICD-10-CM

## 2024-12-31 DIAGNOSIS — G56.02 CARPAL TUNNEL SYNDROME ON LEFT: ICD-10-CM

## 2024-12-31 NOTE — PROGRESS NOTES
He presents today for evaluation of his right hand.  He grades pain 2/10 its achy he does have some numbness at times when sleeping.  He has a hard time gripping.  He has similar symptoms in the opposite side and again over the last several years we have tried to amend his right carpal tunnel claim to include left carpal tunnel syndrome based on the nature of his work and flow-through from repetitive use.  He says his  is requesting a DOL upgrade for his claim to include the left carpal tunnel syndrome and I would support that as I have over the last several years.  Once has been approved he should return for consideration of EMG testing and potential surgery.

## 2025-01-02 ENCOUNTER — OFFICE VISIT (OUTPATIENT)
Dept: ORTHOPEDIC SURGERY | Age: 60
End: 2025-01-02

## 2025-01-02 VITALS — HEIGHT: 73 IN | WEIGHT: 279 LBS | BODY MASS INDEX: 36.98 KG/M2

## 2025-01-02 DIAGNOSIS — M47.812 CERVICAL SPONDYLOSIS WITHOUT MYELOPATHY: Primary | ICD-10-CM

## 2025-01-02 NOTE — PROGRESS NOTES
He returns today for evaluation of his neck.  He has been doing fairly well having achy pain that he grades 1/10.  He still has numbness in hands which could be of his residual carpal tunnel syndrome versus cervical radiculopathy but that will probably be worked up at a different time.  At this time he is comfortable with how he feels I do not feel he needs an x-ray today but will repeat an x-ray in a year if he still has achiness.  He should otherwise continue with heat stretching over-the-counter anti-inflammatories and observation.  Return otherwise in a year if needed

## 2025-02-03 ENCOUNTER — OFFICE VISIT (OUTPATIENT)
Dept: FAMILY MEDICINE CLINIC | Age: 60
End: 2025-02-03

## 2025-02-03 VITALS
OXYGEN SATURATION: 95 % | HEART RATE: 85 BPM | HEIGHT: 73 IN | DIASTOLIC BLOOD PRESSURE: 78 MMHG | BODY MASS INDEX: 36.98 KG/M2 | TEMPERATURE: 98.1 F | WEIGHT: 279 LBS | SYSTOLIC BLOOD PRESSURE: 130 MMHG

## 2025-02-03 DIAGNOSIS — R05.1 ACUTE COUGH: ICD-10-CM

## 2025-02-03 DIAGNOSIS — J18.9 PNEUMONIA OF RIGHT LOWER LOBE DUE TO INFECTIOUS ORGANISM: Primary | ICD-10-CM

## 2025-02-03 DIAGNOSIS — R06.2 WHEEZING: ICD-10-CM

## 2025-02-03 DIAGNOSIS — E11.9 TYPE 2 DIABETES MELLITUS WITHOUT COMPLICATION, WITHOUT LONG-TERM CURRENT USE OF INSULIN (HCC): ICD-10-CM

## 2025-02-03 RX ORDER — ALBUTEROL SULFATE 0.83 MG/ML
2.5 SOLUTION RESPIRATORY (INHALATION) ONCE
Status: COMPLETED | OUTPATIENT
Start: 2025-02-03 | End: 2025-02-03

## 2025-02-03 RX ORDER — METHYLPREDNISOLONE 4 MG/1
TABLET ORAL
Qty: 1 KIT | Refills: 0 | Status: SHIPPED | OUTPATIENT
Start: 2025-02-03

## 2025-02-03 RX ORDER — METHOCARBAMOL 750 MG/1
TABLET, FILM COATED ORAL
Qty: 40 TABLET | Refills: 0 | Status: SHIPPED | OUTPATIENT
Start: 2025-02-03

## 2025-02-03 RX ORDER — AZITHROMYCIN 250 MG/1
250 TABLET, FILM COATED ORAL SEE ADMIN INSTRUCTIONS
Qty: 6 TABLET | Refills: 0 | Status: SHIPPED | OUTPATIENT
Start: 2025-02-03 | End: 2025-02-08

## 2025-02-03 RX ORDER — CEFPODOXIME PROXETIL 200 MG/1
200 TABLET, FILM COATED ORAL 2 TIMES DAILY
Qty: 20 TABLET | Refills: 0 | Status: SHIPPED | OUTPATIENT
Start: 2025-02-03 | End: 2025-02-13

## 2025-02-03 RX ORDER — ALBUTEROL SULFATE 90 UG/1
2 INHALANT RESPIRATORY (INHALATION) 4 TIMES DAILY PRN
Qty: 18 G | Refills: 0 | Status: SHIPPED | OUTPATIENT
Start: 2025-02-03

## 2025-02-03 RX ORDER — BUDESONIDE 0.5 MG/2ML
0.5 INHALANT ORAL ONCE
Status: COMPLETED | OUTPATIENT
Start: 2025-02-03 | End: 2025-02-03

## 2025-02-03 RX ADMIN — ALBUTEROL SULFATE 2.5 MG: 0.83 SOLUTION RESPIRATORY (INHALATION) at 16:32

## 2025-02-03 RX ADMIN — BUDESONIDE 500 MCG: 0.5 INHALANT ORAL at 16:32

## 2025-02-03 SDOH — ECONOMIC STABILITY: FOOD INSECURITY: WITHIN THE PAST 12 MONTHS, YOU WORRIED THAT YOUR FOOD WOULD RUN OUT BEFORE YOU GOT MONEY TO BUY MORE.: NEVER TRUE

## 2025-02-03 SDOH — ECONOMIC STABILITY: FOOD INSECURITY: WITHIN THE PAST 12 MONTHS, THE FOOD YOU BOUGHT JUST DIDN'T LAST AND YOU DIDN'T HAVE MONEY TO GET MORE.: NEVER TRUE

## 2025-02-03 ASSESSMENT — PATIENT HEALTH QUESTIONNAIRE - PHQ9
2. FEELING DOWN, DEPRESSED OR HOPELESS: NOT AT ALL
SUM OF ALL RESPONSES TO PHQ QUESTIONS 1-9: 0
SUM OF ALL RESPONSES TO PHQ9 QUESTIONS 1 & 2: 0
1. LITTLE INTEREST OR PLEASURE IN DOING THINGS: NOT AT ALL

## 2025-02-03 ASSESSMENT — ENCOUNTER SYMPTOMS
SINUS PRESSURE: 1
WHEEZING: 1
CHOKING: 0
STRIDOR: 0
SINUS PAIN: 1
VOICE CHANGE: 0
TROUBLE SWALLOWING: 0
RHINORRHEA: 1
COUGH: 1
APNEA: 0
SHORTNESS OF BREATH: 1
SORE THROAT: 0
FACIAL SWELLING: 0
GASTROINTESTINAL NEGATIVE: 1
CHEST TIGHTNESS: 1

## 2025-02-03 NOTE — PATIENT INSTRUCTIONS
Not feeling your best?  Where to go for the right care at the right time.    Dear Nakul Parkinson   I wanted to provide you with some information that might help you seek care for your condition when your primary care provider or specialist is unavailable. If you have a need outside of normal business hours, you should first contact your primary care office or specialist caring for your condition. They may have on-call providers that could assist with your care. During office hours, you may request a virtual or same day appointment.   But what if your primary care provider is not in the office that day and you can't wait until the  next day for care? In that situation, your next option is to visit an urgent care facility.          [facility] now has urgent care sites open to support our community.   [facility] is a great alternative when you need immediate medical care that is not a serious threat to your health or your doctor's office is closed or unable to get you in for an appointment. The urgent care centers offer fast access to [facility] doctors for minor illnesses and injuries for patients of all ages. There are other medical services available including lab testing, X-rays, EKGs, and IV fluids.  Locations are open daily from 8 a.m. - 8 p.m.     [facility]  [address], [place], [place] [address]  [phone]    [address], [place], [place] [address]  [phone]    Local Urgent Care     [facility] 8:30 am - 7:70 pm   [address] [place] [address]  [phone]    [facility]     8 am - 8 pm   [address] [place] [address]  [phone]    [facility] 7 am - 7:30 pm  [address] [place] [address]  [phone]     [facility] [place] 7 am - 7:30 pm  [address] [place] [address]  [phone]    [place]

## 2025-02-03 NOTE — PROGRESS NOTES
Nakul Parkinson (:  1965) is a 59 y.o. male, here for evaluation of the following chief complaint(s):  Cough (Pt has had cough and chest congestion for the past 3 weeks. )         Assessment & Plan  1. Potential walking pneumonia.  Symptoms include cough, chest congestion, and wheezing, particularly on the right lower side. There is a history of type 2 diabetes without long-term use of insulin. Last A1C 6.2. He is a former smoker. A nebulizer treatment with albuterol and budesonide was administered during the visit, which provided some relief. Prescriptions for azithromycin, cephalosporin, and a Medrol Dosepak were sent to [facility]. He is advised to start the antibiotics tonight and the steroids tomorrow morning, ensuring they are taken with food. An inhaler for use as needed for wheezing or shortness of breath has also been prescribed. He is advised to consume yogurt daily while on antibiotics and for a week thereafter to prevent gastrointestinal side effects. Avoidance of raw milk during illness is recommended. He is encouraged to take warm steamy showers to alleviate sinus congestion and mucus accumulation. A chest x-ray has been ordered, to be performed if there is no improvement in symptoms by Friday.    2. Type 2 diabetes mellitus.  The last A1c was 6.2, indicating prediabetes. He is advised to continue monitoring his blood sugar levels and maintain a diet that helps manage his blood sugar.    Results  Laboratory Studies  A1c was 6.2.  1. Pneumonia of right lower lobe due to infectious organism  -     albuterol (PROVENTIL) (2.5 MG/3ML) 0.083% nebulizer solution 2.5 mg; 2.5 mg, Nebulization, ONCE, 1 dose, On Mon 2/3/25 at 1600Initiate RT Bronchodilator Protocol: No  -     budesonide (PULMICORT) nebulizer suspension 500 mcg; 500 mcg (0.5 mg), Nebulization, ONCE, 1 dose, On Mon 2/3/25 at 1600Rinse mouth out with water (without swallowing) after every dose.  -     albuterol sulfate HFA (VENTOLIN HFA) 108

## 2025-02-07 ENCOUNTER — HOSPITAL ENCOUNTER (OUTPATIENT)
Dept: GENERAL RADIOLOGY | Age: 60
Discharge: HOME | End: 2025-02-07
Payer: COMMERCIAL

## 2025-02-07 ENCOUNTER — HOSPITAL ENCOUNTER (OUTPATIENT)
Age: 60
Discharge: HOME | End: 2025-02-07
Payer: COMMERCIAL

## 2025-02-07 DIAGNOSIS — J18.9 PNEUMONIA OF RIGHT LOWER LOBE DUE TO INFECTIOUS ORGANISM: ICD-10-CM

## 2025-02-07 PROCEDURE — 71046 X-RAY EXAM CHEST 2 VIEWS: CPT

## 2025-03-12 DIAGNOSIS — I10 ESSENTIAL HYPERTENSION: ICD-10-CM

## 2025-03-12 DIAGNOSIS — I10 UNCONTROLLED HYPERTENSION: ICD-10-CM

## 2025-03-12 RX ORDER — ESCITALOPRAM OXALATE 10 MG/1
10 TABLET ORAL DAILY
Qty: 90 TABLET | Refills: 0 | Status: SHIPPED | OUTPATIENT
Start: 2025-03-12

## 2025-03-12 RX ORDER — LOSARTAN POTASSIUM 25 MG/1
TABLET ORAL
Qty: 90 TABLET | Refills: 0 | Status: SHIPPED | OUTPATIENT
Start: 2025-03-12

## 2025-03-12 NOTE — TELEPHONE ENCOUNTER
Refill Request     CONFIRM preferrred pharmacy with the patient.    If Mail Order Rx - Pend for 90 day refill.      Last Seen: Last Seen Department: 2/3/2025  Last Seen by PCP: Visit date not found    Last Written:     If no future appointment scheduled, route STAFF MESSAGE with patient name to the  Pool for scheduling.      Next Appointment:   Future Appointments   Date Time Provider Department Center   3/14/2025 10:00 AM Hernando Iqbal MD [facility]   3/20/2025 10:00 AM Radha Abreu APRN - CNP [facility] [place]       Message sent to  to schedule appt with patient?  NO      Requested Prescriptions     Pending Prescriptions Disp Refills    losartan (COZAAR) 25 MG tablet 90 tablet 3     Sig: TAKE ONE TABLET BY MOUTH DAILY    escitalopram (LEXAPRO) 10 MG tablet 90 tablet 3     Sig: Take 1 tablet by mouth daily

## 2025-03-14 ENCOUNTER — OFFICE VISIT (OUTPATIENT)
Age: 60
End: 2025-03-14
Payer: COMMERCIAL

## 2025-03-14 DIAGNOSIS — G56.02 CARPAL TUNNEL SYNDROME ON LEFT: Primary | ICD-10-CM

## 2025-03-14 PROCEDURE — 99213 OFFICE O/P EST LOW 20 MIN: CPT | Performed by: ORTHOPAEDIC SURGERY

## 2025-03-14 NOTE — PROGRESS NOTES
He returns today for evaluation of his left hand.  He finally has been approved through [employer] for diagnosis of left carpal tunnel syndrome.  He has had 4 years of conservative care with bracing medication stretching topical anti-inflammatories etc. none of which should help long-term.  He has not symptoms consistent with carpal tunnel having numbness dropping things in the median nerve distribution night pain and weakness of  at [employer].     At this point in consideration of his extended period of conservative care and his failure to improve with an EMG which demonstrates carpal tunnel syndrome, I would recommend proceeding with left carpal tunnel release under local MAC when it can be arranged.  He has a good understanding of the diagnosis, the surgery, the perioperative management and potential risks and desires to proceed with dental can be approved and arranged.      INFORMED CONSENT NOTE        We discussed the risks, benefits, and alternatives to the proposed procedure, as well as the necessity of other members of the healthcare team participating in the procedure. All questions were answered and the patient elected to proceed with the proposed procedure and signed the informed consent form.

## 2025-03-18 RX ORDER — METHOCARBAMOL 750 MG/1
TABLET, FILM COATED ORAL
Qty: 40 TABLET | Refills: 0 | Status: SHIPPED | OUTPATIENT
Start: 2025-03-18

## 2025-03-25 ENCOUNTER — OFFICE VISIT (OUTPATIENT)
Dept: FAMILY MEDICINE CLINIC | Age: 60
End: 2025-03-25

## 2025-03-25 VITALS
OXYGEN SATURATION: 95 % | BODY MASS INDEX: 37.07 KG/M2 | WEIGHT: 281 LBS | SYSTOLIC BLOOD PRESSURE: 130 MMHG | DIASTOLIC BLOOD PRESSURE: 80 MMHG | HEART RATE: 77 BPM

## 2025-03-25 DIAGNOSIS — I10 ESSENTIAL HYPERTENSION: ICD-10-CM

## 2025-03-25 DIAGNOSIS — E78.2 MIXED HYPERLIPIDEMIA: ICD-10-CM

## 2025-03-25 DIAGNOSIS — G56.01 CARPAL TUNNEL SYNDROME ON RIGHT: ICD-10-CM

## 2025-03-25 DIAGNOSIS — Z12.5 SCREENING FOR PROSTATE CANCER: ICD-10-CM

## 2025-03-25 DIAGNOSIS — R53.81 CHRONIC FATIGUE AND MALAISE: ICD-10-CM

## 2025-03-25 DIAGNOSIS — E55.9 VITAMIN D DEFICIENCY: ICD-10-CM

## 2025-03-25 DIAGNOSIS — G56.02 CARPAL TUNNEL SYNDROME ON LEFT: ICD-10-CM

## 2025-03-25 DIAGNOSIS — R53.82 CHRONIC FATIGUE AND MALAISE: ICD-10-CM

## 2025-03-25 DIAGNOSIS — Z01.818 PREOP EXAMINATION: Primary | ICD-10-CM

## 2025-03-25 DIAGNOSIS — E11.9 TYPE 2 DIABETES MELLITUS WITHOUT COMPLICATION, WITHOUT LONG-TERM CURRENT USE OF INSULIN: ICD-10-CM

## 2025-03-25 LAB
25(OH)D3 SERPL-MCNC: 22.8 NG/ML
ALBUMIN SERPL-MCNC: 4.4 G/DL (ref 3.4–5)
ALBUMIN/GLOB SERPL: 1.8 {RATIO} (ref 1.1–2.2)
ALP SERPL-CCNC: 74 U/L (ref 40–129)
ALT SERPL-CCNC: 28 U/L (ref 10–40)
ANION GAP SERPL CALCULATED.3IONS-SCNC: 10 MMOL/L (ref 3–16)
AST SERPL-CCNC: 29 U/L (ref 15–37)
BASOPHILS # BLD: 0.1 K/UL (ref 0–0.2)
BASOPHILS NFR BLD: 1 %
BILIRUB SERPL-MCNC: 0.7 MG/DL (ref 0–1)
BILIRUBIN, POC: ABNORMAL
BLOOD URINE, POC: ABNORMAL
BUN SERPL-MCNC: 16 MG/DL (ref 7–20)
CALCIUM SERPL-MCNC: 9.5 MG/DL (ref 8.3–10.6)
CHLORIDE SERPL-SCNC: 103 MMOL/L (ref 99–110)
CHOLEST SERPL-MCNC: 139 MG/DL (ref 0–199)
CLARITY, POC: CLEAR
CO2 SERPL-SCNC: 26 MMOL/L (ref 21–32)
COLOR, POC: YELLOW
CREAT SERPL-MCNC: 0.7 MG/DL (ref 0.8–1.3)
CREAT UR-MCNC: 193 MG/DL (ref 39–259)
DEPRECATED RDW RBC AUTO: 14.6 % (ref 12.4–15.4)
EOSINOPHIL # BLD: 0.1 K/UL (ref 0–0.6)
EOSINOPHIL NFR BLD: 1 %
EST. AVERAGE GLUCOSE BLD GHB EST-MCNC: 151.3 MG/DL
FOLATE SERPL-MCNC: 11.6 NG/ML (ref 4.78–24.2)
GFR SERPLBLD CREATININE-BSD FMLA CKD-EPI: >90 ML/MIN/{1.73_M2}
GLUCOSE SERPL-MCNC: 136 MG/DL (ref 70–99)
GLUCOSE URINE, POC: ABNORMAL MG/DL
HBA1C MFR BLD: 6.9 %
HCT VFR BLD AUTO: 48.8 % (ref 40.5–52.5)
HDLC SERPL-MCNC: 35 MG/DL (ref 40–60)
HGB BLD-MCNC: 16.4 G/DL (ref 13.5–17.5)
KETONES, POC: ABNORMAL MG/DL
LDLC SERPL CALC-MCNC: 79 MG/DL
LEUKOCYTE EST, POC: ABNORMAL
LYMPHOCYTES # BLD: 1.6 K/UL (ref 1–5.1)
LYMPHOCYTES NFR BLD: 19 %
MCH RBC QN AUTO: 30 PG (ref 26–34)
MCHC RBC AUTO-ENTMCNC: 33.6 G/DL (ref 31–36)
MCV RBC AUTO: 89.4 FL (ref 80–100)
MICROALBUMIN UR DL<=1MG/L-MCNC: <1.2 MG/DL
MICROALBUMIN/CREAT UR: NORMAL MG/G (ref 0–30)
MONOCYTES # BLD: 0.7 K/UL (ref 0–1.3)
MONOCYTES NFR BLD: 10 %
NEUTROPHILS # BLD: 4.9 K/UL (ref 1.7–7.7)
NEUTROPHILS NFR BLD: 66 %
NITRITE, POC: ABNORMAL
PH, POC: 5.5
PLATELET # BLD AUTO: 186 K/UL (ref 135–450)
PMV BLD AUTO: 9.1 FL (ref 5–10.5)
POTASSIUM SERPL-SCNC: 4.8 MMOL/L (ref 3.5–5.1)
PROT SERPL-MCNC: 6.9 G/DL (ref 6.4–8.2)
PROTEIN, POC: ABNORMAL MG/DL
PSA SERPL DL<=0.01 NG/ML-MCNC: 0.35 NG/ML (ref 0–4)
RBC # BLD AUTO: 5.46 M/UL (ref 4.2–5.9)
RBC MORPH BLD: NORMAL
SODIUM SERPL-SCNC: 139 MMOL/L (ref 136–145)
SPECIFIC GRAVITY, POC: 1.02
TRIGL SERPL-MCNC: 124 MG/DL (ref 0–150)
TSH SERPL DL<=0.005 MIU/L-ACNC: 1.59 UIU/ML (ref 0.27–4.2)
UROBILINOGEN, POC: 0.2 MG/DL
VARIANT LYMPHS NFR BLD MANUAL: 3 % (ref 0–6)
VIT B12 SERPL-MCNC: 472 PG/ML (ref 211–911)
VLDLC SERPL CALC-MCNC: 25 MG/DL
WBC # BLD AUTO: 7.4 K/UL (ref 4–11)

## 2025-03-25 NOTE — PATIENT INSTRUCTIONS
Not feeling your best?  Where to go for the right care at the right time.    Dear Nakul Parkinson   I wanted to provide you with some information that might help you seek care for your condition when your primary care provider or specialist is unavailable. If you have a need outside of normal business hours, you should first contact your primary care office or specialist caring for your condition. They may have on-call providers that could assist with your care. During office hours, you may request a virtual or same day appointment.   But what if your primary care provider is not in the office that day and you can't wait until the  next day for care? In that situation, your next option is to visit an urgent care facility.          [facility] now has urgent care sites open to support our community.   [facility] is a great alternative when you need immediate medical care that is not a serious threat to your health or your doctor's office is closed or unable to get you in for an appointment. The urgent care centers offer fast access to [facility] doctors for minor illnesses and injuries for patients of all ages. There are other medical services available including lab testing, X-rays, EKGs, and IV fluids.  Locations are open daily from 8 a.m. - 8 p.m.     [facility]  [address], [place], [place] [address]  [phone]    [address], [place], [place] [address]  [phone]    Local Urgent Care     [facility] 8:30 am - 7:70 pm   [address] [place] [address]  [phone]    [facility]     8 am - 8 pm   [address] [place] [address]  [phone]    [facility] 7 am - 7:30 pm  [address] [place] [address]  [phone]     [facility] / [place] 7 am - 7:30 pm  [address] [place] [address]  [phone]    [place]

## 2025-03-25 NOTE — PROGRESS NOTES
glucose. History and physical. Review of medications.   2. Further recommendations from consultants:No    3. Change in medication regimen before surgery: Hold all medications on morning of surgery   4. Prophylaxis for cardiac events with perioperative beta-blockers: Not indicated  ACC/AHA indications for pre-operative beta-blocker use:    Vascular surgery with history of postitive stress test  Intermediate or high risk surgery with history of CAD   Intermediate or high risk surgery with multiple clinical predictors of CAD- 2 of the following: history of compensated or prior heart failure, history of cerebrovascular disease, DM, or renal insufficiency    Routine administration of higher-dose, long-acting metoprolol in beta-blocker-naïve patients on the day of surgery, and in the absence of dose titration is associated with an overall increase in mortality.  Beta-blockers should be started days to weeks prior to surgery and titrated to pulse < 70.  5. Deep vein thrombosis prophylaxis: regimen to be chosen by surgical team  6. No contraindications to planned surgery       If you have questions, please do not hesitate to call me ([phone]).     Sincerely,      Radha Abreu, SHARON, APRN, FNP-BC

## 2025-03-26 ENCOUNTER — TELEPHONE (OUTPATIENT)
Dept: ORTHOPEDIC SURGERY | Age: 60
End: 2025-03-26

## 2025-03-26 ENCOUNTER — RESULTS FOLLOW-UP (OUTPATIENT)
Dept: FAMILY MEDICINE CLINIC | Age: 60
End: 2025-03-26

## 2025-03-26 ENCOUNTER — TELEPHONE (OUTPATIENT)
Dept: FAMILY MEDICINE CLINIC | Age: 60
End: 2025-03-26

## 2025-03-26 DIAGNOSIS — E55.9 VITAMIN D DEFICIENCY: ICD-10-CM

## 2025-03-26 DIAGNOSIS — E11.9 TYPE 2 DIABETES MELLITUS WITHOUT COMPLICATION, WITHOUT LONG-TERM CURRENT USE OF INSULIN: Primary | ICD-10-CM

## 2025-03-26 DIAGNOSIS — E78.2 MIXED HYPERLIPIDEMIA: ICD-10-CM

## 2025-03-26 DIAGNOSIS — I10 UNCONTROLLED HYPERTENSION: ICD-10-CM

## 2025-03-26 DIAGNOSIS — I10 ESSENTIAL HYPERTENSION: ICD-10-CM

## 2025-03-26 RX ORDER — ROSUVASTATIN CALCIUM 20 MG/1
20 TABLET, COATED ORAL DAILY
Qty: 90 TABLET | Refills: 1 | Status: SHIPPED | OUTPATIENT
Start: 2025-03-26

## 2025-03-26 RX ORDER — ERGOCALCIFEROL 1.25 MG/1
50000 CAPSULE, LIQUID FILLED ORAL WEEKLY
Qty: 12 CAPSULE | Refills: 1 | Status: SHIPPED | OUTPATIENT
Start: 2025-03-26

## 2025-03-26 NOTE — TELEPHONE ENCOUNTER
RN called and spoke to patient, Nakul.    Patient needs correction with Fahad in 6 months. Please schedule. Patient declined to be scheduled.     Patient has vitamin D deficiency.   Discuss with patient that we make vitamin D from the sun and get it from some food sources, but it is very common to be deficient.  Discuss the need for vitamin D replacement because low vitamin D can cause fatigue, joint aches and has been implicated in heart disease, bone disease like osteoporosis, and some other chronic illnesses.   Will call in cholecalciferol 50,000 units to be taken weekly for 6 months to increase vitamin D levels and then recommend taking an OTC calcium and vitamin D supplement daily to maintain vitamin D3 levels.    Recommend vitamin D to be rechecked in 6 months.  Labs ordered and scheduled.    CMP is stable.  Kidney and liver fxn stable.  Electrolytes stable.      Good cholesterol is low.    Recommend increasing Crestor to 20 mg nightly and recheck lipid, CK, and CMP in 8 weeks.  Labs ordered and scheduled.   Please route higher dose of Crestor to this provider along with weekly vitamin D3.   RX's  Pended.    A1C has increased and is now actually in the diabetic range.   Recommend working on diet and exercise.    Will send message to Trisha to assist in diabetic education.    Patient states he eats a lot of fruit, vegetables and meat.   Patient would like information mailed to his home on diabetic diet. RN mailed information and will follow up with a phone call to review.    Recommend weight loss of 10 pounds to help with insulin resistance.   Recheck A1C in 8 weeks with other labs.  Labs ordered and pended.  If still elevated, will discuss medications at that time. He needs diabetic eye exam performed as well.      CBC is stable.  Does not reveal anemia or obvious infection at this time.  B12 and folate stable.  PSA WNL. Thyroid function stable.      The 10-year ASCVD risk score (Flora DK, et al., 2019) is: 19.2%

## 2025-03-27 ENCOUNTER — PREP FOR PROCEDURE (OUTPATIENT)
Dept: ORTHOPEDIC SURGERY | Age: 60
End: 2025-03-27

## 2025-03-27 ENCOUNTER — TELEPHONE (OUTPATIENT)
Dept: ORTHOPEDIC SURGERY | Age: 60
End: 2025-03-27

## 2025-03-27 DIAGNOSIS — G56.02 CARPAL TUNNEL SYNDROME ON LEFT: Primary | ICD-10-CM

## 2025-03-27 LAB
SHBG SERPL-SCNC: 37 NMOL/L (ref 19–76)
TESTOST FREE SERPL-MCNC: 100.9 PG/ML (ref 47–244)
TESTOST SERPL-MCNC: 513 NG/DL (ref 193–740)

## 2025-03-27 NOTE — TELEPHONE ENCOUNTER
Surgery and/or Procedure Scheduling     Contact Name: TIGRE WHITLEY  Surgical/Procedure Request: LEFT HAND  Patient Contact Number: [phone]

## 2025-04-01 NOTE — PROGRESS NOTES

## 2025-04-08 RX ORDER — SODIUM CHLORIDE, SODIUM LACTATE, POTASSIUM CHLORIDE, CALCIUM CHLORIDE 600; 310; 30; 20 MG/100ML; MG/100ML; MG/100ML; MG/100ML
INJECTION, SOLUTION INTRAVENOUS CONTINUOUS
Status: CANCELLED | OUTPATIENT
Start: 2025-04-08

## 2025-04-08 RX ORDER — SODIUM CHLORIDE 0.9 % (FLUSH) 0.9 %
5-40 SYRINGE (ML) INJECTION PRN
Status: CANCELLED | OUTPATIENT
Start: 2025-04-08

## 2025-04-08 RX ORDER — SODIUM CHLORIDE 9 MG/ML
INJECTION, SOLUTION INTRAVENOUS PRN
Status: CANCELLED | OUTPATIENT
Start: 2025-04-08

## 2025-04-08 RX ORDER — SODIUM CHLORIDE 0.9 % (FLUSH) 0.9 %
5-40 SYRINGE (ML) INJECTION EVERY 12 HOURS SCHEDULED
Status: CANCELLED | OUTPATIENT
Start: 2025-04-08

## 2025-04-14 ENCOUNTER — ANESTHESIA EVENT (OUTPATIENT)
Dept: OPERATING ROOM | Age: 60
End: 2025-04-14
Payer: OTHER GOVERNMENT

## 2025-04-14 ENCOUNTER — ANESTHESIA (OUTPATIENT)
Dept: OPERATING ROOM | Age: 60
End: 2025-04-14
Payer: OTHER GOVERNMENT

## 2025-04-14 ENCOUNTER — HOSPITAL ENCOUNTER (OUTPATIENT)
Age: 60
Setting detail: OUTPATIENT SURGERY
Discharge: HOME | End: 2025-04-14
Attending: ORTHOPAEDIC SURGERY | Admitting: ORTHOPAEDIC SURGERY
Payer: OTHER GOVERNMENT

## 2025-04-14 VITALS
HEART RATE: 78 BPM | DIASTOLIC BLOOD PRESSURE: 81 MMHG | HEIGHT: 73 IN | BODY MASS INDEX: 37.24 KG/M2 | SYSTOLIC BLOOD PRESSURE: 125 MMHG | TEMPERATURE: 97.9 F | RESPIRATION RATE: 17 BRPM | WEIGHT: 281 LBS | OXYGEN SATURATION: 98 %

## 2025-04-14 DIAGNOSIS — G56.02 CARPAL TUNNEL SYNDROME ON LEFT: Primary | ICD-10-CM

## 2025-04-14 LAB
GLUCOSE BLD-MCNC: 165 MG/DL (ref 70–99)
PERFORMED ON: ABNORMAL

## 2025-04-14 PROCEDURE — 7100000011 HC PHASE II RECOVERY - ADDTL 15 MIN: Performed by: ORTHOPAEDIC SURGERY

## 2025-04-14 PROCEDURE — 6360000002 HC RX W HCPCS: Performed by: NURSE ANESTHETIST, CERTIFIED REGISTERED

## 2025-04-14 PROCEDURE — 2580000003 HC RX 258: Performed by: ANESTHESIOLOGY

## 2025-04-14 PROCEDURE — 2500000003 HC RX 250 WO HCPCS: Performed by: ORTHOPAEDIC SURGERY

## 2025-04-14 PROCEDURE — 7100000001 HC PACU RECOVERY - ADDTL 15 MIN: Performed by: ORTHOPAEDIC SURGERY

## 2025-04-14 PROCEDURE — 7100000010 HC PHASE II RECOVERY - FIRST 15 MIN: Performed by: ORTHOPAEDIC SURGERY

## 2025-04-14 PROCEDURE — 3700000000 HC ANESTHESIA ATTENDED CARE: Performed by: ORTHOPAEDIC SURGERY

## 2025-04-14 PROCEDURE — 2709999900 HC NON-CHARGEABLE SUPPLY: Performed by: ORTHOPAEDIC SURGERY

## 2025-04-14 PROCEDURE — 7100000000 HC PACU RECOVERY - FIRST 15 MIN: Performed by: ORTHOPAEDIC SURGERY

## 2025-04-14 PROCEDURE — 2580000003 HC RX 258: Performed by: ORTHOPAEDIC SURGERY

## 2025-04-14 PROCEDURE — 3700000001 HC ADD 15 MINUTES (ANESTHESIA): Performed by: ORTHOPAEDIC SURGERY

## 2025-04-14 PROCEDURE — 6360000002 HC RX W HCPCS: Performed by: ORTHOPAEDIC SURGERY

## 2025-04-14 PROCEDURE — 3600000002 HC SURGERY LEVEL 2 BASE: Performed by: ORTHOPAEDIC SURGERY

## 2025-04-14 PROCEDURE — 3600000012 HC SURGERY LEVEL 2 ADDTL 15MIN: Performed by: ORTHOPAEDIC SURGERY

## 2025-04-14 PROCEDURE — 6360000002 HC RX W HCPCS: Performed by: ANESTHESIOLOGY

## 2025-04-14 RX ORDER — LIDOCAINE HYDROCHLORIDE 20 MG/ML
INJECTION, SOLUTION INFILTRATION; PERINEURAL
Status: DISCONTINUED | OUTPATIENT
Start: 2025-04-14 | End: 2025-04-14 | Stop reason: SDUPTHER

## 2025-04-14 RX ORDER — SODIUM CHLORIDE 0.9 % (FLUSH) 0.9 %
5-40 SYRINGE (ML) INJECTION EVERY 12 HOURS SCHEDULED
Status: DISCONTINUED | OUTPATIENT
Start: 2025-04-14 | End: 2025-04-14 | Stop reason: HOSPADM

## 2025-04-14 RX ORDER — PROPOFOL 10 MG/ML
INJECTION, EMULSION INTRAVENOUS
Status: DISCONTINUED | OUTPATIENT
Start: 2025-04-14 | End: 2025-04-14 | Stop reason: SDUPTHER

## 2025-04-14 RX ORDER — SODIUM CHLORIDE 0.9 % (FLUSH) 0.9 %
5-40 SYRINGE (ML) INJECTION PRN
Status: DISCONTINUED | OUTPATIENT
Start: 2025-04-14 | End: 2025-04-14 | Stop reason: HOSPADM

## 2025-04-14 RX ORDER — SODIUM CHLORIDE, SODIUM LACTATE, POTASSIUM CHLORIDE, CALCIUM CHLORIDE 600; 310; 30; 20 MG/100ML; MG/100ML; MG/100ML; MG/100ML
INJECTION, SOLUTION INTRAVENOUS CONTINUOUS
Status: DISCONTINUED | OUTPATIENT
Start: 2025-04-14 | End: 2025-04-14 | Stop reason: HOSPADM

## 2025-04-14 RX ORDER — SODIUM CHLORIDE 9 MG/ML
INJECTION, SOLUTION INTRAVENOUS PRN
Status: DISCONTINUED | OUTPATIENT
Start: 2025-04-14 | End: 2025-04-14 | Stop reason: HOSPADM

## 2025-04-14 RX ORDER — HYDROCODONE BITARTRATE AND ACETAMINOPHEN 5; 325 MG/1; MG/1
1 TABLET ORAL EVERY 6 HOURS PRN
Qty: 28 TABLET | Refills: 0 | Status: SHIPPED | OUTPATIENT
Start: 2025-04-14 | End: 2025-04-21

## 2025-04-14 RX ORDER — LIDOCAINE HYDROCHLORIDE 10 MG/ML
0.3 INJECTION, SOLUTION EPIDURAL; INFILTRATION; INTRACAUDAL; PERINEURAL
Status: DISCONTINUED | OUTPATIENT
Start: 2025-04-14 | End: 2025-04-14 | Stop reason: HOSPADM

## 2025-04-14 RX ORDER — MAGNESIUM HYDROXIDE 1200 MG/15ML
LIQUID ORAL CONTINUOUS PRN
Status: COMPLETED | OUTPATIENT
Start: 2025-04-14 | End: 2025-04-14

## 2025-04-14 RX ADMIN — SODIUM CHLORIDE, POTASSIUM CHLORIDE, SODIUM LACTATE AND CALCIUM CHLORIDE: 600; 310; 30; 20 INJECTION, SOLUTION INTRAVENOUS at 08:44

## 2025-04-14 RX ADMIN — PROPOFOL 100 MG: 10 INJECTION, EMULSION INTRAVENOUS at 08:50

## 2025-04-14 RX ADMIN — PROPOFOL 150 MCG/KG/MIN: 10 INJECTION, EMULSION INTRAVENOUS at 08:50

## 2025-04-14 RX ADMIN — LIDOCAINE HYDROCHLORIDE 60 MG: 20 INJECTION, SOLUTION INFILTRATION; PERINEURAL at 08:50

## 2025-04-14 RX ADMIN — SODIUM CHLORIDE 3000 MG: 9 INJECTION, SOLUTION INTRAVENOUS at 08:50

## 2025-04-14 RX ADMIN — FAMOTIDINE 20 MG: 10 INJECTION, SOLUTION INTRAVENOUS at 08:38

## 2025-04-14 ASSESSMENT — PAIN - FUNCTIONAL ASSESSMENT
PAIN_FUNCTIONAL_ASSESSMENT: NONE - DENIES PAIN
PAIN_FUNCTIONAL_ASSESSMENT: 0-10
PAIN_FUNCTIONAL_ASSESSMENT: NONE - DENIES PAIN

## 2025-04-14 NOTE — DISCHARGE INSTRUCTIONS
Ice to wrist/hand 8 hours.  Elevate for 8 hours.  Sling for 8 hours and / or when up walking.  May loosen elastic bandage if fingers swell.  Keep dressing dry.  May remove bulky dressing in 8 days..  ANESTHESIA DISCHARGE INSTRUCTIONS    You are under the influence of drugs- do not drink alcohol, drive a car, operate machinery(such as power tools, kitchen appliances, etc), sign legal documents, or make any important decisions for 24 hours (or while on pain medications).   Children should not ride bikes or skate boards or play on gym sets  for 24 hours after surgery.  A responsible adult should be with you for 24 hours.  Rest at home today- increase activity as tolerated.  Progress slowly to a regular diet unless your physician has instructed you otherwise. Drink plenty of water.    CALL YOUR DOCTOR IF YOU:  Have moderate to severe nausea or vomiting AND are unable to hold down fluids or prescribed medications.  Have bright red bloody drainage from your dressing that won't stop oozing.  Do not get relief with your pain medication    NORMAL (POSSIBLE) SIDE EFFECTS FROM ANESTHESIA:     Confusion, temporary memory loss, delayed reaction times in the first 24 hours  Lightheadedness, dizziness, difficulty focusing, blurred vision  Nausea/vomiting can happen  Shivering, feeling cold, sore throat, cough and muscle aches should stop within 24-48 hours  Trouble urinating - call your surgeon if it has been more than 8 hrs  Bruising or soreness at the IV site - call if it remains red, firm or there is drainage             FEMALES OF CHILDBEARING AGE WHO ARE TAKING BIRTH CONTROL PILLS:  You may have received a medication during your procedure that interferes with the   actions of birth control pills (Bridion or Emend). Use some other kind of birth control in addition to your pills, like a condom, for 1 month after your procedure to prevent unwanted pregnancy.    The following instructions are to be followed if you have a

## 2025-04-14 NOTE — OP NOTE
[address] [address]                            OPERATIVE REPORT      PATIENT NAME: TIGRE HARDEN              : 1965  MED REC NO: [id number]                      ROOM: [facility]  ACCOUNT NO: [id number]                       ADMIT DATE: 2025  PROVIDER: Hernando Iqbal MD      DATE OF PROCEDURE:  2025    SURGEON:  Hernando Iqbal MD    PREOPERATIVE DIAGNOSIS:  Left carpal tunnel syndrome.    POSTOPERATIVE DIAGNOSIS:  Left carpal tunnel syndrome.    OPERATION PERFORMED:  Left carpal tunnel release.    ANESTHESIA:  Local, MAC.    BLOOD LOSS:  Less than 5 mL.    COMPLICATIONS:  None noted.    INDICATION FOR PROCEDURE:  This 60-year-old male presented with history, exam, and testing consistent with left carpal tunnel syndrome, moderate severity.  After failure of modalities for over several months, he elected for the recommendation of surgical intervention.    DESCRIPTION OF OPERATION:  The patient was taken to the operating room where, under sedation, local infiltration into the left palm was performed using 8 mL of 50:50 solution of 1% Xylocaine and 0.5% Marcaine plain.  The hand was sterilely prepped and draped, and an Ace wrap was used for exsanguination and tourniquet control proximal to the wrist.  Under loupe magnification, a 3.5 cm incision was made from the flexion crease of the wrist distally in line with the 4th ray.  Soft tissue was dissected down through skin and subcutaneous tissue and through the superficial palmar fascia.  The transverse carpal ligament was identified and released from proximal to distal ends from the median nerve end of the carpal tunnel through to the trifurcation.  The nerve was minimally compressed.  An external neurolysis was performed on the volar surface to reconstitute the normal nerve diameter.    The wound was repaired with 4-0 nylon in an interrupted horizontal mattress

## 2025-04-14 NOTE — ANESTHESIA PRE PROCEDURE
Department of Anesthesiology  Preprocedure Note       Name:  Nakul Parkinson   Age:  60 y.o.  :  1965                                          MRN:  [id number]         Date:  2025      Surgeon: Surgeon(s):  Hernando Iqbal MD    Procedure: Procedure(s):  LEFT CARPAL TUNNEL RELEASE    Medications prior to admission:   Prior to Admission medications    Medication Sig Start Date End Date Taking? Authorizing Provider   HYDROcodone-acetaminophen (NORCO) 5-325 MG per tablet Take 1 tablet by mouth every 6 hours as needed for Pain (Intended supply: 7 days. Take lowest dose possible to manage pain) for up to 7 days. Max Daily Amount: 4 tablets 25 Yes Hernando Iqbal MD   rosuvastatin (CRESTOR) 20 MG tablet Take 1 tablet by mouth daily 3/26/25  Yes Radha Abreu APRN - CNP   vitamin D (ERGOCALCIFEROL) 1.25 MG (62173 UT) CAPS capsule Take 1 capsule by mouth once a week 3/26/25  Yes Radha Abreu APRN - CNP   methocarbamol (ROBAXIN) 750 MG tablet TAKE 1 TABLET BY MOUTH 4 TIMES A DAY FOR 10 DAYS 3/18/25  Yes Hernando Iqbal MD   losartan (COZAAR) 25 MG tablet TAKE ONE TABLET BY MOUTH DAILY 3/12/25  Yes Radha Abreu APRN - CNP   escitalopram (LEXAPRO) 10 MG tablet Take 1 tablet by mouth daily 3/12/25  Yes Radha Abreu APRN - CNP   celecoxib (CELEBREX) 200 MG capsule Take 1 capsule by mouth daily 24  Yes Hernando Iqbal MD   acetaminophen (TYLENOL) 500 MG tablet Take 1 tablet by mouth every 6 hours as needed for Pain 24  Yes Hernando Wing MD   aspirin 325 MG EC tablet Take 1 tablet by mouth daily 24  Yes Hernando Wing MD   glimepiride (AMARYL) 1 MG tablet TAKE ONE TABLET BY MOUTH EVERY MORNING BEFORE BREAKFAST 24  Yes Hernando Wing MD   CLOMIPHENE CITRATE PO Take by mouth From Dr. Hays   Yes Provider, MD Jayce   tiZANidine (ZANAFLEX) 4 MG tablet Take 1 tablet by mouth nightly as needed

## 2025-04-14 NOTE — ANESTHESIA POSTPROCEDURE EVALUATION
Department of Anesthesiology  Postprocedure Note    Patient: Nakul Parkinson  MRN: [id number]  YOB: 1965  Date of evaluation: 4/14/2025    Procedure Summary       Date: 04/14/25 Room / Location: [address]    Anesthesia Start: 0844 Anesthesia Stop: 0915    Procedure: LEFT CARPAL TUNNEL RELEASE (Left: Hand) Diagnosis:       Carpal tunnel syndrome on left      (Carpal tunnel syndrome on left [G56.02])    Surgeons: Hernando Iqbal MD Responsible Provider: Jefferson Ochoa MD    Anesthesia Type: general ASA Status: 3            Anesthesia Type: No value filed.    Rosas Phase I: Rosas Score: 10    Rosas Phase II: Rosas Score: 10    Anesthesia Post Evaluation    Comments: Postoperative Anesthesia Note    Name:    Nakul Parkinson  MRN:      [id number]    Patient Vitals in the past 12 hrs:  04/14/25 0950, BP:125/81, Temp:97.9 °F (36.6 °C), Temp src:Temporal, Pulse:78, Resp:17, SpO2:98 %  04/14/25 0930, BP:(!) 141/92, Pulse:82, Resp:13, SpO2:97 %  04/14/25 0925, BP:(!) 139/92, Pulse:85, Resp:14, SpO2:98 %  04/14/25 0920, BP:(!) 127/92, Pulse:84, Resp:16, SpO2:94 %  04/14/25 0915, BP:(!) 135/94, Temp:97.6 °F (36.4 °C), Temp src:Temporal, Pulse:92, Resp:16, SpO2:95 %  04/14/25 0744, Temp:98 °F (36.7 °C), Temp src:Oral, Pulse:82, Resp:18, SpO2:94 %, Height:1.854 m (6' 1\"), Weight:127.5 kg (281 lb)     LABS:    CBC  Lab Results       Component                Value               Date/Time                  WBC                      7.4                 03/25/2025 09:25 AM        HGB                      16.4                03/25/2025 09:25 AM        HCT                      48.8                03/25/2025 09:25 AM        PLT                      186                 03/25/2025 09:25 AM   RENAL  Lab Results       Component                Value               Date/Time                  NA                       139                 03/25/2025 09:25 AM        K                        4.8

## 2025-04-14 NOTE — BRIEF OP NOTE
Brief Postoperative Note      Patient: Nakul Parkinson  YOB: 1965  MRN: [id number]    Date of Procedure: 4/14/2025    Pre-Op Diagnosis Codes:      * Carpal tunnel syndrome on left [G56.02]    Post-Op Diagnosis: Same       Procedure(s):  LEFT CARPAL TUNNEL RELEASE    Surgeon(s):  Hernando Iqbal MD    Assistant:  Surgical Assistant: Lesia Willett    Anesthesia: Monitor Anesthesia Care    Estimated Blood Loss (mL): Minimal    Complications: None    Specimens:   * No specimens in log *    Implants:  * No implants in log *      Drains: * No LDAs found *    Findings:  Infection Present At Time Of Surgery (PATOS) (choose all levels that have infection present):  No infection present  Other Findings: none    Electronically signed by Hernando Iqbal MD on 4/14/2025 at 9:03 AM

## 2025-04-14 NOTE — PROGRESS NOTES
Phase I (PACU)  1.  Patient is identified using name and the date of birth.  2.  The patient is free from signs and symptoms of chemical, electrical, laser, radiation, positioning, or transfer/transport injury.  3.  The patient receives appropriate medication(s), safely administered during the Perioperative period.  4.  The patient has wound/tissue perfusion consistent with or improved from baseline levels established preoperatively.  5.  The patient is at or returning to normothermia at the conclusion of the immediate postoperative period.  6.  The patient's fluid, electrolyte, and acid base balances are consistent with or improved from baseline levels established preoperatively.  7.  The patient's pulmonary function is consistent with or improved from baseline levels established preoperatively.  8.  The patient's cardiovascular status is consistent with or improved from baseline levels established preoperatively.  9.  The patient/caregiver participates in decisions affecting his or her Perioperative plan of care.  10.  The patient's care is consistent with the individualized Perioperative plan of care.  11.  The patient's right to privacy is maintained.  12.  The patient is the recipient of competent and ethical care within legal standards of practice.  13.  The patient's value system, lifestyle, ethnicity, and culture are considered, respected, and incorporated in the Perioperative plan of care.  14.  The patient demonstrates and/or reports adequate pain control throughout the the Perioperative period.  15.  The patient's neurological status is consistent with or improved from baseline levels established preoperatively.  16.  The patient/caregiver demonstrates knowledge of the expected responses to the operative or invasive procedure.  17.  Patient/Caregiver has reduced anxiety.  Interventions- Familiarize with environment and equipment.

## 2025-04-24 ENCOUNTER — OFFICE VISIT (OUTPATIENT)
Dept: ORTHOPEDIC SURGERY | Age: 60
End: 2025-04-24

## 2025-04-24 VITALS — HEIGHT: 73 IN | WEIGHT: 281 LBS | BODY MASS INDEX: 37.24 KG/M2

## 2025-04-24 DIAGNOSIS — G56.02 CARPAL TUNNEL SYNDROME ON LEFT: Primary | ICD-10-CM

## 2025-04-24 DIAGNOSIS — Z96.651 S/P TOTAL KNEE REPLACEMENT, RIGHT: ICD-10-CM

## 2025-04-24 DIAGNOSIS — M17.11 PRIMARY OSTEOARTHRITIS OF RIGHT KNEE: ICD-10-CM

## 2025-04-24 RX ORDER — CELECOXIB 200 MG/1
200 CAPSULE ORAL 2 TIMES DAILY
Qty: 180 CAPSULE | Refills: 3 | Status: SHIPPED | OUTPATIENT
Start: 2025-04-24

## 2025-04-24 RX ORDER — METHOCARBAMOL 750 MG/1
750 TABLET, FILM COATED ORAL 3 TIMES DAILY
Qty: 270 TABLET | Refills: 3 | Status: SHIPPED | OUTPATIENT
Start: 2025-04-24

## 2025-04-24 NOTE — PROGRESS NOTES
FOLLOW-UP VISIT      The patient returns for follow-up s/p left carpal tunnel release    Date of Surgery    4/15/2025    Wound Status    Sutures Removed, Incisions are healing well with no surrounding erythema, and minimal ecchymosis.     Exam    He is doing well has no pain.  He is happy with the result    Plan    Suture removal, Jaylon Miller brace and refill medications    Follow-up Appointment    At this time he can return in 4 weeks if needed but based on exam and history today he can be found and maximally medically improved as of today, since no other intervention is anticipated

## 2025-05-19 ENCOUNTER — TELEPHONE (OUTPATIENT)
Dept: ORTHOPEDIC SURGERY | Age: 60
End: 2025-05-19

## 2025-05-19 NOTE — TELEPHONE ENCOUNTER
[facility] PATIENT IS REQUESTING A LETTER STATING HE HAS METAL IN HIS NECK FOR UPCOMING TRAVEL PLANS. HE IS ALSO REQUESTING A COPY OF HIS 2022 CERVICAL MRI REPORT AND ACTUAL DISC. PLEASE CONTACT  HIM TO LET HIM KNOW HOW AND WHEN HE CAN RECEIVE THOSE.    CONTACT:[phone]

## 2025-05-21 NOTE — TELEPHONE ENCOUNTER
Spoke with patient and I let him know that his letter and disc will be at the  at [facility]

## 2025-06-23 DIAGNOSIS — I10 ESSENTIAL HYPERTENSION: ICD-10-CM

## 2025-06-23 DIAGNOSIS — I10 UNCONTROLLED HYPERTENSION: ICD-10-CM

## 2025-06-23 RX ORDER — LOSARTAN POTASSIUM 25 MG/1
25 TABLET ORAL DAILY
Qty: 90 TABLET | Refills: 0 | OUTPATIENT
Start: 2025-06-23

## 2025-06-24 RX ORDER — ESCITALOPRAM OXALATE 10 MG/1
10 TABLET ORAL DAILY
Qty: 90 TABLET | Refills: 0 | OUTPATIENT
Start: 2025-06-24

## 2025-07-30 ENCOUNTER — OFFICE VISIT (OUTPATIENT)
Dept: ORTHOPEDIC SURGERY | Age: 60
End: 2025-07-30

## 2025-07-30 VITALS — HEIGHT: 73 IN | BODY MASS INDEX: 37.24 KG/M2 | WEIGHT: 281 LBS

## 2025-07-30 DIAGNOSIS — Z96.651 S/P TOTAL KNEE REPLACEMENT, RIGHT: ICD-10-CM

## 2025-07-30 DIAGNOSIS — G89.29 CHRONIC PAIN OF RIGHT KNEE: Primary | ICD-10-CM

## 2025-07-30 DIAGNOSIS — M25.561 CHRONIC PAIN OF RIGHT KNEE: Primary | ICD-10-CM

## 2025-07-30 RX ORDER — PREDNISONE 5 MG/1
5 TABLET ORAL SEE ADMIN INSTRUCTIONS
Qty: 55 TABLET | Refills: 0 | Status: SHIPPED | OUTPATIENT
Start: 2025-07-30 | End: 2025-08-09

## 2025-07-30 NOTE — PROGRESS NOTES
KNEE VISIT      HISTORY OF PRESENT ILLNESS    Nakul Parkinson is a 60 y.o. male who presents for evaluation of his right knee.  He had a total knee replacement done a year ago.  He does have multiple medical comorbidities including type 2 diabetes and some other issues.  He has been on disability since.  He denies any recent trauma but just has noted it is hard to bend his knee as 2 weeks of some increased pain.  ROS    Well-documented patient history form dated 7/30/2025  All other ROS negative except for above.    Past Surgical history    Past Surgical History:   Procedure Laterality Date    BACK SURGERY N/A 11/18/2022    REMOVAL OF LIPOMA AND CYST CERVICAL/THORACIC AREA performed by Antonio Cui MD at [facility] [place]    CARPAL TUNNEL RELEASE      CARPAL TUNNEL RELEASE Left 04/14/2025    CARPAL TUNNEL RELEASE Left 4/14/2025    LEFT CARPAL TUNNEL RELEASE performed by Hernando Iqbal MD at [facility] [place]    COLONOSCOPY  11/04/2016    normal    CYST REMOVAL      HAND SURGERY      HERNIA REPAIR      KNEE ARTHROSCOPY      KNEE ARTHROSCOPY Right 12/01/2014    KNEE ARTHROSCOPY Left 11/12/2018    KNEE VIDEO ARTHROSCOPY, MEDIAL MENISECTOMY, CHONDROPLASTY, INTERNAL FIXATION MEDIAL TIBIAL PLATEAU INSUFFICIENCY FRACTURE WITH BONE SUBSTITUTE     KNEE SURGERY      KS ARTHROSCOPY KNEE OSTEOCHONDRAL ALLOGRAFT Left 11/12/2018    KNEE VIDEO ARTHROSCOPY, MEDIAL MENISECTOMY, CHONDROPLASTY, INTERNAL FIXATION MEDIAL TIBIAL PLATEAU INSUFFICIENCY FRACTURE WITH BONE SUBSTITUTE performed by Hernando Iqbal MD at [facility] [place]    TOTAL KNEE ARTHROPLASTY Right 06/19/2024    RIGHT KNEE TOTAL ARTHROPLASTY -GENERAL-BLOCK-23 HOUR HOLD- performed by Hernando Iqbal MD at [facility] [place]       PAST MEDICAL    Past Medical History:   Diagnosis Date    Anxiety     Carpal tunnel syndrome 10/24/2013    Diabetes mellitus (HCC)     Herniated disc     Hypertension     Obesity     Osteoarthritis     PONV (postoperative nausea and vomiting)     Primary localized

## 2025-07-31 ENCOUNTER — OFFICE VISIT (OUTPATIENT)
Dept: ORTHOPEDIC SURGERY | Age: 60
End: 2025-07-31

## 2025-07-31 VITALS — HEIGHT: 73 IN | WEIGHT: 280 LBS | BODY MASS INDEX: 37.11 KG/M2

## 2025-07-31 DIAGNOSIS — M47.812 CERVICAL SPONDYLOSIS WITHOUT MYELOPATHY: ICD-10-CM

## 2025-07-31 DIAGNOSIS — G56.02 CARPAL TUNNEL SYNDROME ON LEFT: Primary | ICD-10-CM

## 2025-07-31 NOTE — PROGRESS NOTES
Chief Complaint    Follow-up ([facility] 6 month f/u cervical )      History of Present Illness:  Nakul Parkinson is a 60 y.o. male presents with chief complaints of moderate neck pain graded 3-4/10.  Is for 6-month check.  Actually I told him today that his surgeon who did his cervical fusion has retired as of about 4 weeks ago.  I made some recommendations to him where he may consider going for follow-up.  He had concerned because [facility] is [facility] with [facility] also and he needs something he has had called a [employer] to substantiate the claim.  According to his history, he says that when he was ask when he initially hurt his neck in 1983 when he fell down a stairwell had a knee evaluation facility, he did tell them that 2 or 3 years before that in wrestling he had he injured his neck but required no care and had no follow-up or issues with it.  2 years later he fell down and now apparently the [facility] is saying that it was a pre-existing issue.  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  Therefore, although he had a pre-existing condition, it was substantially aggravated by the fall and should be compensable.  At this point he is just going month-to-month to maintain an open claim.  Pain Assessment  Location of Pain: Neck  Severity of Pain: 3  Quality of Pain: Aching, Dull  Duration of Pain: Persistent  Frequency of Pain: Intermittent    Medical History:  Patient's medications, allergies, past medical, surgical, social and family histories were reviewed and updated as appropriate.    Review of Systems:  Pertinent items are noted in HPI  Review of systems reviewed from Patient History Form dated on 7/31/2025 and

## 2025-08-05 DIAGNOSIS — I10 UNCONTROLLED HYPERTENSION: ICD-10-CM

## 2025-08-05 DIAGNOSIS — I10 ESSENTIAL HYPERTENSION: ICD-10-CM

## 2025-08-05 RX ORDER — LOSARTAN POTASSIUM 25 MG/1
25 TABLET ORAL DAILY
Qty: 90 TABLET | Refills: 0 | OUTPATIENT
Start: 2025-08-05

## 2025-08-06 ENCOUNTER — OFFICE VISIT (OUTPATIENT)
Dept: ORTHOPEDIC SURGERY | Age: 60
End: 2025-08-06

## 2025-08-06 VITALS — HEIGHT: 73 IN | BODY MASS INDEX: 37.11 KG/M2 | WEIGHT: 280 LBS

## 2025-08-06 DIAGNOSIS — G56.01 CARPAL TUNNEL SYNDROME ON RIGHT: Primary | ICD-10-CM

## (undated) DEVICE — SPLINT KNEE UNIV FOR LESS THAN 36IN L24IN FOAM LAM E CNTCT

## (undated) DEVICE — SPLINT ORTH W3XL12IN LAYERED FBRGLS FOAM PD BRTH BK MOLD

## (undated) DEVICE — NEEDLE SPNL L3.5IN PNK HUB S STL REG WALL FIT STYL W/ QNCKE

## (undated) DEVICE — BNDG,ELSTC,MATRIX,STRL,4"X5YD,LF,HOOK&LP: Brand: MEDLINE

## (undated) DEVICE — PADDING CAST W6INXL4YD NONSTERILE COT RAYON MICROPLEATED

## (undated) DEVICE — SUTURE VCRL SZ 3-0 L144IN ABSRB UD LIGAPAK REEL NDL J885G

## (undated) DEVICE — SUTURE ETHLN SZ 3-0 L18IN NONABSORBABLE BLK PS-2 L19MM 3/8 1669H

## (undated) DEVICE — DRESSING,GAUZE,XEROFORM,CURAD,1"X8",ST: Brand: CURAD

## (undated) DEVICE — 3M™ TEGADERM™ TRANSPARENT FILM DRESSING FRAME STYLE, 1626W, 4 IN X 4-3/4 IN (10 CM X 12 CM), 50/CT 4CT/CASE: Brand: 3M™ TEGADERM™

## (undated) DEVICE — SPONGE LAP W18XL18IN WHT COT 4 PLY FLD STRUNG RADPQ DISP ST 2 PER PACK

## (undated) DEVICE — APPLICATOR MEDICATED 26 CC SOLUTION HI LT ORNG CHLORAPREP

## (undated) DEVICE — MHCZ TOTAL KNEE: Brand: MEDLINE INDUSTRIES, INC.

## (undated) DEVICE — GOWN,AURORA,NONREINF,RAGLAN,XXL,STERILE: Brand: MEDLINE

## (undated) DEVICE — SPLINT KNEE L20IN FOR 32IN THGH UNIV FOAM 3 PC DSGN TRIMMED

## (undated) DEVICE — SUTURE ETHILON SZ 3-0 L30IN NONABSORBABLE BLK L30MM PSLX 3/8 1691H

## (undated) DEVICE — BANDAGE COMPR W3INXL5YD BGE POLY COT E RECOVERABLE BRTH W/

## (undated) DEVICE — SYRINGE MED 10ML LUERLOCK TIP W/O SFTY DISP

## (undated) DEVICE — TIP SUCT DIA12FR W STYL CTRL VENT DISPOSABLE FRAZ

## (undated) DEVICE — MAJOR SET UP PK

## (undated) DEVICE — SOLUTION IV IRRIG 500ML 0.9% SODIUM CHL 2F7123

## (undated) DEVICE — SUTURE MCRYL SZ 3-0 L27IN ABSRB UD L26MM SH 1/2 CIR Y416H

## (undated) DEVICE — Z INACTIVE USE 2635508 SOLUTION IRRIG 500ML 0.9% SOD CHL USP POUR PLAS BTL

## (undated) DEVICE — HANDPIECE SET WITH HIGH FLOW TIP AND SUCTION TUBE: Brand: INTERPULSE

## (undated) DEVICE — SKIN AFFIX SURG ADHESIVE 72/CS 0.55ML: Brand: MEDLINE

## (undated) DEVICE — SUTURE VCRL SZ 3-0 L18IN ABSRB UD L26MM SH 1/2 CIR J864D

## (undated) DEVICE — TUBE SUCT L10IN MINI FLTR CRV KAMVAC

## (undated) DEVICE — Device

## (undated) DEVICE — MHCZ EXTREMITY: Brand: MEDLINE INDUSTRIES, INC.

## (undated) DEVICE — LIGHT HANDLE: Brand: DEVON

## (undated) DEVICE — ELECTRODE PT RET AD L9FT HI MOIST COND ADH HYDRGEL CORDED

## (undated) DEVICE — SUTURE NONABSORBABLE MONOFILAMENT 4-0 FS-2 18 IN ETHILON 662H

## (undated) DEVICE — GAUZE,SPONGE,2"X2",8PLY,STERILE,LF,2'S: Brand: MEDLINE

## (undated) DEVICE — SOLUTION IRRIG 3000ML 0.9% SOD CHL USP UROMATIC PLAS CONT

## (undated) DEVICE — TUBE IRRIG L8IN LNG PT W/ CONN FOR PMP SYS REDEUCE

## (undated) DEVICE — Z INACTIVE USE 2660664 SOLUTION IRRIG 3000ML 0.9% SOD CHL USP UROMATIC PLAS CONT

## (undated) DEVICE — GLOVE ORANGE PI 8 1/2   MSG9085

## (undated) DEVICE — TUBING PMP L8FT LNG W/ CONN FOR AR-6400 REDEUCE

## (undated) DEVICE — BLADE SHV L13CM DIA4MM EXCALIBUR AGG COOLCUT

## (undated) DEVICE — SUTURE VCRL SZ 2-0 L18IN ABSRB UD CT-1 L36MM 1/2 CIR J839D

## (undated) DEVICE — SLING ARM L L165IN D75IN WHT POLY MESH ENVELOP MTL SIDE

## (undated) DEVICE — GAUZE,SPONGE,4"X4",8PLY,STRL,LF,10/TRAY: Brand: MEDLINE

## (undated) DEVICE — SUTURE VCRL SZ 4-0 L18IN ABSRB UD L19MM PS-2 3/8 CIR PRIM J496H

## (undated) DEVICE — GOWN SIRUS NONREIN XL W/TWL: Brand: MEDLINE INDUSTRIES, INC.

## (undated) DEVICE — GLOVE ORANGE PI 7 1/2   MSG9075

## (undated) DEVICE — SOLUTION IRRIG 500ML 0.9% SOD CHLO USP POUR PLAS BTL

## (undated) DEVICE — DRAPE C ARM UNIV W41XL74IN CLR PLAS XR VELC CLSR POLY STRP

## (undated) DEVICE — APPLICATOR PREP 26ML 0.7% IOD POVACRYLEX 74% ISO ALC ST

## (undated) DEVICE — NEEDLE HYPO 25GA L1.5IN BLU POLYPR HUB S STL REG BVL STR

## (undated) DEVICE — CANNULA NSL 13FT TUBE AD ETCO2 DIV SAMP M